# Patient Record
Sex: FEMALE | Race: WHITE | Employment: UNEMPLOYED | URBAN - METROPOLITAN AREA
[De-identification: names, ages, dates, MRNs, and addresses within clinical notes are randomized per-mention and may not be internally consistent; named-entity substitution may affect disease eponyms.]

---

## 2017-05-05 ENCOUNTER — OFFICE VISIT (OUTPATIENT)
Dept: NEUROLOGY | Age: 52
End: 2017-05-05

## 2017-05-05 VITALS
TEMPERATURE: 98.3 F | WEIGHT: 246.1 LBS | HEART RATE: 82 BPM | HEIGHT: 61 IN | BODY MASS INDEX: 46.46 KG/M2 | OXYGEN SATURATION: 98 % | DIASTOLIC BLOOD PRESSURE: 80 MMHG | SYSTOLIC BLOOD PRESSURE: 138 MMHG | RESPIRATION RATE: 18 BRPM

## 2017-05-05 DIAGNOSIS — G43.009 MIGRAINE WITHOUT AURA AND WITHOUT STATUS MIGRAINOSUS, NOT INTRACTABLE: Primary | ICD-10-CM

## 2017-05-05 RX ORDER — ZOLPIDEM TARTRATE 10 MG/1
TABLET ORAL
COMMUNITY
End: 2022-09-02

## 2017-05-05 RX ORDER — SUMATRIPTAN AND NAPROXEN SODIUM 85; 500 MG/1; MG/1
TABLET, FILM COATED ORAL
Qty: 9 TAB | Refills: 3 | Status: SHIPPED | OUTPATIENT
Start: 2017-05-05 | End: 2022-09-02

## 2017-05-05 NOTE — PROGRESS NOTES
[address] [facility]. [address]   [place] [address], [address] [place] [address] Drive    [facility]   [phone] Fax             Referring: Rhina Lemus MD      Chief Complaint   Patient presents with    Headache     daily headache of varied intensity and frequency (x 25yrs)    Light Sensitivity     and sound     52-year-old left-handed woman who presents for evaluation today of what she calls frequent headaches with light and sound sensitivity. She tells me that she has been a headache lady for a number of years and really has not done anything about it as they have not been bothersome. She notes that she has headaches are located in the vertex and she describes them as a squeezing or throbbing type sensation. She has associated photophobia and phonophobia and nausea and rare vomiting. She typically will have 2 per month. On average they last 2 days. She tries to go to bed and sleep them off. She has never had any focal neurologic deficits with a headache. She does not have an aura. She does not lose consciousness, lose vision. She has no fever or chills associated. She has not had any chest pain or shortness of breath or palpitations. She has tried over-the-counter medications including Motrin and Tylenol. She notes she will use Excedrin Migraine if she has a particularly bad headache and that seems to help. She has never tried any migraine specific medications with the exception of Imitrex nasal spray. She was also given some Fioricet-although that is not a migraine specific medication, and that did not do much. She has gone to get a Toradol injection and that has helped her as well. She has never been on a preventative medication. Her mother was diagnosed with cluster headaches in the past.  She notes that she snores but she does not stop breathing.   She    Past Medical History:   Diagnosis Date    Anemia, unspecified 8/20/2011    Autoimmune disease ([facility] [address]     fibromyalgia    Coagulation defects     anemia    Crohn's disease ([facility] [address]     Depression     Diabetes ([facility] [address]     off meds-states her A! C is 4.7    Fibromyalgia     GERD (gastroesophageal reflux disease)     Hypercholesterolemia     Musculoskeletal disorder     Other ill-defined conditions     crohns    Psychiatric disorder     depression    Unspecified sleep apnea     cpap-not using currently       Past Surgical History:   Procedure Laterality Date    HX CHOLECYSTECTOMY  1994-95    HX OOPHORECTOMY  2003    right       Current Outpatient Prescriptions   Medication Sig Dispense Refill    zolpidem (AMBIEN) 10 mg tablet Take  by mouth nightly as needed for Sleep.  fluoxetine (PROZAC) 40 mg capsule Take 40 mg by mouth daily.  cetirizine (ZYRTEC) 10 mg tablet Take 10 mg by mouth daily.  omeprazole (PRILOSEC) 40 mg capsule Take 40 mg by mouth daily.  atorvastatin (LIPITOR) 20 mg tablet Take 40 mg by mouth daily. Allergies   Allergen Reactions    Pcn [Penicillins] Anaphylaxis       Social History   Substance Use Topics    Smoking status: Former Smoker     Quit date: 8/22/1998    Smokeless tobacco: Never Used    Alcohol use Yes      Comment: occasional wine abd cocktail       Family History   Problem Relation Age of Onset    Hypertension Mother     Dementia Mother    [facility] Migraines Mother     Hypertension Father     Heart Disease Father        Review of Systems  Pertinent positives and negatives as noted and the remainder of comprehensive review is negative. Examination  Visit Vitals    /80    Pulse 82    Temp 98.3 °F (36.8 °C) (Oral)    Resp 18    Ht 5' 1\" (1.549 m)    Wt 111.6 kg (246 lb 1.6 oz)    LMP 04/14/2017 (Approximate)    SpO2 98%    BMI 46.5 kg/m2     Pleasant, well appearing. No icterus. Oropharynx clear. Supple neck without bruit. Heart regular. No murmur. No edema.       Neurologically, she is awake, alert, and oriented with normal speech and language. Her cognition is normal.    Intact cranial nerves 2-12. No nystagmus. Visual fields full to confrontation. Disk margins are flat bilaterally. She has normal bulk and tone. She has no abnormal movement. She has no pronation or drift. She generates full strength in the upper and lower extremities to direct confrontational testing. Reflexes are symmetrical in the upper and lower extremities bilaterally. Her toes are down bilaterally. No Curry. Finger nose finger and rapid alternating movements are normal.  Steady gait. No sensory deficit to primary modalities. No Romberg. Impression/Plan  We discussed migraine pathophysiology and the medications we use to treat them including triptans for abortive therapy and medications use to prevent. We discussed migraines are to be viewed as a chronic condition which can be managed much like diabetes or HTN. Typically she has 2 of these about headaches a month and certainly we should try to treat these effectively. Her average duration of 2 days makes me wish to use a combination medication that would hopefully shut down both components of migraine we discussed that. She has had exposure to Imitrex in the past and has tolerated that and therefore we will use Treximet which is of course sumatriptan as well as Naprosyn. We discussed the administration, mechanism of action, potential benefits, potential side effects, and alternatives. She will use 1 at headache onset repeat that in 2 hours if needed maximum 2 in 24 hours. She will track her headaches on a calendar bring that each appointment. She will follow in about 2 months with headache diary. This note was created using voice recognition software. Despite editing, there may be syntax errors. This note will not be viewable in [address]

## 2017-05-05 NOTE — PATIENT INSTRUCTIONS
Information Regarding Testing     If you have physican order for a test or a medication denied by your insurance company, this does not mean the test or medication is not appropriate for you as that is a medical decision, not a decision to be made by an insurance company representative or by an [facility] physician who has not interviewed and examined you. This is a decision to be made between you and your physician. The denial of services is a contractual matter between you and your insurance company, not an issue between your physician and the insurance company. If your test or medication is denied, you can take the following steps to help resolve the issue:    1. File a complaint with the [facility] regarding your insurance company's denial of services ordered for you. You can do this either by calling them directly or by completing an on-line complaint form on the [employer]. This can be found at [email or web address]    2. Also file a formal complaint with your insurance company and ask to have the name of the person denying the service so that you may explore a legal option should you be harmed by this denial of service. Again, the fact the insurance company will not pay for the service does not mean it is not medically necessary and I would encourage you to follow through with the plan that was made with your physician    3. File a written complaint with your employer so your employer and benefit manager is aware of the poor coverage they are providing their employees. If you have medicare/medicaid, complain to your representative in the House and to your Michael Nicole. If we have ordered testing for you, we do not call patients with results and we do not give test results over the phone. We schedule follow up appointments so that your results can be discussed in person and any questions you have regarding them may be addressed.   If something of concern is revealed on your test, we will call you for a sooner follow up appointment. Additionally, results may be found by using the My Chart feature and one of our patient service representatives at the  can give you instructions on how to access this feature of our electronic medical record system. [facility]   Statement to Patients  April 1, 2014      In an effort to ensure the large volume of patient prescription refills is processed in the most efficient and expeditious manner, we are asking our patients to assist us by calling your Pharmacy for all prescription refills, this will include also your  Mail Order Pharmacy. The pharmacy will contact our office electronically to continue the refill process. Please do not wait until the last minute to call your pharmacy. We need at least 48 hours (2days) to fill prescriptions. We also encourage you to call your pharmacy before going to  your prescription to make sure it is ready. With regard to controlled substance prescription refill requests (narcotic refills) that need to be picked up at our office, we ask your cooperation by providing us with at least 72 hours (3days) notice that you will need a refill. We will not refill narcotic prescription refill requests after 4:00pm on any weekday, Monday through Thursday, or after 2:00pm on Fridays, or on the weekends. We encourage everyone to explore another way of getting your prescription refill request processed using [employer], our patient web portal through our electronic medical record system. [employer] is an efficient and effective way to communicate your medication request directly to the office and  downloadable as an marina on your smart phone . [employer] also features a review functionality that allows you to view your medication list as well as leave messages for your physician. Are you ready to get connected?  If so please review the attatched instructions or speak to any of our staff to get you set up right away! Thank you so much for your cooperation. Should you have any questions please contact our Practice Administrator. The Physicians and Staff,  [facility] Tomas Nieves  What is a living will? A living will is a legal form you use to write down the kind of care you want at the end of your life. It is used by the health professionals who will treat you if you aren't able to decide for yourself. If you put your wishes in writing, your loved ones and others will know what kind of care you want. They won't need to guess. This can ease your mind and be helpful to others. A living will is not the same as an estate or property will. An estate will explains what you want to happen with your money and property after you die. Is a living will a legal document? A living will is a legal document. Each state has its own laws about living gaines. If you move to another state, make sure that your living will is legal in the state where you now live. Or you might use a universal form that has been approved by many states. This kind of form can sometimes be completed and stored online. Your electronic copy will then be available wherever you have a connection to the Internet. In most cases, doctors will respect your wishes even if you have a form from a different state. · You don't need an  to complete a living will. But legal advice can be helpful if your state's laws are unclear, your health history is complicated, or your family can't agree on what should be in your living will. · You can change your living will at any time. Some people find that their wishes about end-of-life care change as their health changes. · In addition to making a living will, think about completing a medical power of  form.  This form lets you name the person you want to make end-of-life treatment decisions for you (your \"health care agent\") if you're not able to. Many hospitals and nursing homes will give you the forms you need to complete a living will and a medical power of . · Your living will is used only if you can't make or communicate decisions for yourself anymore. If you become able to make decisions again, you can accept or refuse any treatment, no matter what you wrote in your living will. · Your state may offer an online registry. This is a place where you can store your living will online so the doctors and nurses who need to treat you can find it right away. What should you think about when creating a living will? Talk about your end-of-life wishes with your family members and your doctor. Let them know what you want. That way the people making decisions for you won't be surprised by your choices. Think about these questions as you make your living will:  · Do you know enough about life support methods that might be used? If not, talk to your doctor so you know what might be done if you can't breathe on your own, your heart stops, or you're unable to swallow. · What things would you still want to be able to do after you receive life-support methods? Would you want to be able to walk? To speak? To eat on your own? To live without the help of machines? · If you have a choice, where do you want to be cared for? In your home? At a hospital or nursing home? · Do you want certain Yarsani practices performed if you become very ill? · If you have a choice at the end of your life, where would you prefer to die? At home? In a hospital or nursing home? Somewhere else? · Would you prefer to be buried or cremated? · Do you want your organs to be donated after you die? What should you do with your living will? · Make sure that your family members and your health care agent have copies of your living will.   · Give your doctor a copy of your living will to keep in your medical record. If you have more than one doctor, make sure that each one has a copy. · You may want to put a copy of your living will where it can be easily found. Where can you learn more? Go to http://colton-naya.info/. Enter S964 in the search box to learn more about \"Learning About Living Edith Alcazar. \"  Current as of: February 24, 2016  Content Version: 11.2  © 8074-0203 [employer], [employer]. Care instructions adapted under license by [employer] (which disclaims liability or warranty for this information). If you have questions about a medical condition or this instruction, always ask your healthcare professional. [place] [address] any warranty or liability for your use of this information.

## 2017-05-05 NOTE — MR AVS SNAPSHOT
Visit Information Date & Time Provider Department Dept. Phone Encounter #  
 5/5/2017  1:00 PM Halina Reyes MD Neurology [place] [id number] [phone] [id number] Follow-up Instructions Return in about 2 months (around 7/5/2017). Upcoming Health Maintenance Date Due DTaP/Tdap/Td series (1 - Tdap) 5/9/1987 PAP AKA CERVICAL CYTOLOGY 5/9/1987 BREAST CANCER SCRN MAMMOGRAM 5/9/2016 FOBT Q 1 YEAR AGE 50-75 5/9/2016 INFLUENZA AGE 9 TO ADULT 8/1/2017 Allergies as of 5/5/2017  Review Complete On: 5/5/2017 By: Halina Reyes MD  
  
 Severity Noted Reaction Type Reactions Pcn [Penicillins] High 08/22/2011    Anaphylaxis Current Immunizations  Never Reviewed No immunizations on file. Not reviewed this visit Vitals BP Pulse Temp Resp Height(growth percentile) Weight(growth percentile) 138/80 82 98.3 °F (36.8 °C) (Oral) 18 5' 1\" (1.549 m) 246 lb 1.6 oz (111.6 kg) LMP SpO2 BMI OB Status Smoking Status 04/14/2017 (Approximate) 98% 46.5 kg/m2 Having regular periods Former Smoker Vitals History BMI and BSA Data Body Mass Index Body Surface Area  
 46.5 kg/m 2 2.19 m 2 Preferred Pharmacy Pharmacy Name Phone [address], [address] [address] [facility] [phone] Your Updated Medication List  
  
   
This list is accurate as of: 5/5/17  1:43 PM.  Always use your most recent med list.  
  
  
  
  
 AMBIEN 10 mg tablet Generic drug:  zolpidem Take  by mouth nightly as needed for Sleep. LIPITOR 20 mg tablet Generic drug:  atorvastatin Take 40 mg by mouth daily. omeprazole 40 mg capsule Commonly known as:  PRILOSEC Take 40 mg by mouth daily. PROzac 40 mg capsule Generic drug:  FLUoxetine Take 40 mg by mouth daily. SUMAtriptan-naproxen  mg tablet Commonly known as:  TREXIMET Take one tablet at headache onset and repeat in 2 hours x1 prn  
  
 ZyrTEC 10 mg tablet Generic drug:  cetirizine Take 10 mg by mouth daily. Prescriptions Sent to Pharmacy Refills SUMAtriptan-naproxen (TREXIMET)  mg tablet 3 Sig: Take one tablet at headache onset and repeat in 2 hours x1 prn Class: Normal  
 Pharmacy: [facility] [facility] #: [phone] Follow-up Instructions Return in about 2 months (around 7/5/2017). Patient Instructions Information Regarding Testing If you have physican order for a test or a medication denied by your insurance company, this does not mean the test or medication is not appropriate for you as that is a medical decision, not a decision to be made by an insurance company representative or by an [facility] physician who has not interviewed and examined you. This is a decision to be made between you and your physician. The denial of services is a contractual matter between you and your insurance company, not an issue between your physician and the insurance company. If your test or medication is denied, you can take the following steps to help resolve the issue: 1. File a complaint with the [facility] of Insurance regarding your insurance company's denial of services ordered for you. You can do this either by calling them directly or by completing an on-line complaint form on the [employer].   This can be found at www.[place].gov 
 
 2.   Also file a formal complaint with your insurance company and ask to have the name of the person denying the service so that you may explore a legal option should you be harmed by this denial of service. Again, the fact the insurance company will not pay for the service does not mean it is not medically necessary and I would encourage you to follow through with the plan that was made with your physician 3. File a written complaint with your employer so your employer and benefit manager is aware of the poor coverage they are providing their employees. If you have medicare/medicaid, complain to your representative in the House and to your Michael Nicole. If we have ordered testing for you, we do not call patients with results and we do not give test results over the phone. We schedule follow up appointments so that your results can be discussed in person and any questions you have regarding them may be addressed. If something of concern is revealed on your test, we will call you for a sooner follow up appointment. Additionally, results may be found by using the My Chart feature and one of our patient service representatives at the  can give you instructions on how to access this feature of our electronic medical record system. PRESCRIPTION REFILL POLICY [facility] Statement to Patients April 1, 2014 In an effort to ensure the large volume of patient prescription refills is processed in the most efficient and expeditious manner, we are asking our patients to assist us by calling your Pharmacy for all prescription refills, this will include also your  Mail Order Pharmacy. The pharmacy will contact our office electronically to continue the refill process. Please do not wait until the last minute to call your pharmacy. We need at least 48 hours (2days) to fill prescriptions. We also encourage you to call your pharmacy before going to  your prescription to make sure it is ready. With regard to controlled substance prescription refill requests (narcotic refills) that need to be picked up at our office, we ask your cooperation by providing us with at least 72 hours (3days) notice that you will need a refill. We will not refill narcotic prescription refill requests after 4:00pm on any weekday, Monday through Thursday, or after 2:00pm on Fridays, or on the weekends. We encourage everyone to explore another way of getting your prescription refill request processed using Kony, our patient web portal through our electronic medical record system. Kony is an efficient and effective way to communicate your medication request directly to the office and  downloadable as an marina on your smart phone . Kony also features a review functionality that allows you to view your medication list as well as leave messages for your physician. Are you ready to get connected? If so please review the attatched instructions or speak to any of our staff to get you set up right away! Thank you so much for your cooperation. Should you have any questions please contact our Practice Administrator. The Physicians and Staff,  [facility] Tor[address]o [address] What is a living will? A living will is a legal form you use to write down the kind of care you want at the end of your life. It is used by the health professionals who will treat you if you aren't able to decide for yourself. If you put your wishes in writing, your loved ones and others will know what kind of care you want. They won't need to guess. This can ease your mind and be helpful to others. A living will is not the same as an estate or property will. An estate will explains what you want to happen with your money and property after you die. Is a living will a legal document? A living will is a legal document. Each state has its own laws about living gaines. If you move to another state, make sure that your living will is legal in the state where you now live. Or you might use a universal form that has been approved by many states. This kind of form can sometimes be completed and stored online. Your electronic copy will then be available wherever you have a connection to the Internet. In most cases, doctors will respect your wishes even if you have a form from a different state. · You don't need an  to complete a living will. But legal advice can be helpful if your state's laws are unclear, your health history is complicated, or your family can't agree on what should be in your living will. · You can change your living will at any time. Some people find that their wishes about end-of-life care change as their health changes. · In addition to making a living will, think about completing a medical power of  form. This form lets you name the person you want to make end-of-life treatment decisions for you (your \"health care agent\") if you're not able to. Many hospitals and nursing homes will give you the forms you need to complete a living will and a medical power of . · Your living will is used only if you can't make or communicate decisions for yourself anymore. If you become able to make decisions again, you can accept or refuse any treatment, no matter what you wrote in your living will. · Your state may offer an online registry. This is a place where you can store your living will online so the doctors and nurses who need to treat you can find it right away. What should you think about when creating a living will? Talk about your end-of-life wishes with your family members and your doctor. Let them know what you want. That way the people making decisions for you won't be surprised by your choices. Think about these questions as you make your living will: · Do you know enough about life support methods that might be used? If not, talk to your doctor so you know what might be done if you can't breathe on your own, your heart stops, or you're unable to swallow. · What things would you still want to be able to do after you receive life-support methods? Would you want to be able to walk? To speak? To eat on your own? To live without the help of machines? · If you have a choice, where do you want to be cared for? In your home? At a hospital or nursing home? · Do you want certain Mosque practices performed if you become very ill? · If you have a choice at the end of your life, where would you prefer to die? At home? In a hospital or nursing home? Somewhere else? · Would you prefer to be buried or cremated? · Do you want your organs to be donated after you die? What should you do with your living will? · Make sure that your family members and your health care agent have copies of your living will. · Give your doctor a copy of your living will to keep in your medical record. If you have more than one doctor, make sure that each one has a copy. · You may want to put a copy of your living will where it can be easily found. Where can you learn more? Go to http://colton-naya.info/. Enter V986 in the search box to learn more about \"Learning About Living Farhan Wallace. \" Current as of: February 24, 2016 Content Version: 11.2 © 3969-4707 [employer]. Care instructions adapted under license by [employer] (which disclaims liability or warranty for this information). If you have questions about a medical condition or this instruction, always ask your healthcare professional. [address] any warranty or liability for your use of this information. Introducing [facility]! Sulmaliu José introduces [employer] patient portal. Now you can access parts of your medical record, email your doctor's office, and request medication refills online. 1. In your internet browser, go to [email or web address] 2. Click on the First Time User? Click Here link in the Sign In box. You will see the New Member Sign Up page. 3. Enter your [employer] Access Code exactly as it appears below. You will not need to use this code after youve completed the sign-up process. If you do not sign up before the expiration date, you must request a new code. · [employer] Access Code: [email or web address] Expires: 8/3/2017  1:43 PM 
 
4. Enter the last four digits of your Social Security Number (xxxx) and Date of Birth (mm/dd/yyyy) as indicated and click Submit. You will be taken to the next sign-up page. 5. Create a [employer] Direct ID. This will be your [employer] login ID and cannot be changed, so think of one that is secure and easy to remember. 6. Create a [employer] password. You can change your password at any time. 7. Enter your Password Reset Question and Answer. This can be used at a later time if you forget your password. 8. Enter your e-mail address. You will receive e-mail notification when new information is available in [address] 9. Click Sign Up. You can now view and download portions of your medical record. 10. Click the Download Summary menu link to download a portable copy of your medical information. If you have questions, please visit the Frequently Asked Questions section of the [employer] website. Remember, [employer] is NOT to be used for urgent needs. For medical emergencies, dial 911. Now available from your iPhone and Android! Please provide this summary of care documentation to your next provider. Your primary care clinician is listed as [address]. If you have any questions after today's visit, please call [phone].

## 2017-05-16 RX ORDER — SUMATRIPTAN AND NAPROXEN SODIUM 85; 500 MG/1; MG/1
TABLET, FILM COATED ORAL
Qty: 2 TAB | Refills: 0 | Status: SHIPPED | COMMUNITY
Start: 2017-05-16 | End: 2020-08-20

## 2017-05-19 ENCOUNTER — ED HISTORICAL/CONVERTED ENCOUNTER (OUTPATIENT)
Dept: OTHER | Age: 52
End: 2017-05-19

## 2017-05-21 PROBLEM — G43.009 MIGRAINE WITHOUT AURA AND WITHOUT STATUS MIGRAINOSUS, NOT INTRACTABLE: Status: ACTIVE | Noted: 2017-05-21

## 2017-06-28 ENCOUNTER — OP HISTORICAL/CONVERTED ENCOUNTER (OUTPATIENT)
Dept: OTHER | Age: 52
End: 2017-06-28

## 2017-10-04 ENCOUNTER — OP HISTORICAL/CONVERTED ENCOUNTER (OUTPATIENT)
Dept: OTHER | Age: 52
End: 2017-10-04

## 2020-03-02 ENCOUNTER — IP HISTORICAL/CONVERTED ENCOUNTER (OUTPATIENT)
Dept: OTHER | Age: 55
End: 2020-03-02

## 2020-08-20 ENCOUNTER — HOSPITAL ENCOUNTER (OUTPATIENT)
Dept: LAB | Age: 55
Discharge: HOME | End: 2020-08-20
Payer: MEDICARE

## 2020-08-20 DIAGNOSIS — U07.1 COVID-19: ICD-10-CM

## 2020-08-20 PROCEDURE — 87635 SARS-COV-2 COVID-19 AMP PRB: CPT

## 2020-08-20 RX ORDER — BISMUTH SUBSALICYLATE 262 MG
1 TABLET,CHEWABLE ORAL DAILY
COMMUNITY

## 2020-08-20 RX ORDER — PREGABALIN 300 MG/1
300 CAPSULE ORAL 2 TIMES DAILY
COMMUNITY
End: 2022-09-02

## 2020-08-20 RX ORDER — HYDROCHLOROTHIAZIDE 25 MG/1
25 TABLET ORAL DAILY
COMMUNITY

## 2020-08-20 NOTE — PROGRESS NOTES
[facility]  Endoscopy Preprocedure Instructions      1. On the day of your surgery, please report to registration located on the 2nd floor of the  [facility]. yes    2. You must have a responsible adult to drive you to the hospital, stay at the hospital during your procedure and drive you home. If they leave your procedure will not be started (no exceptions). yes    3. Do not have anything to eat or drink (including water, gum, mints, coffee, and juice) after midnight. This does not apply to the medications you were instructed to take by your physician. yesIf you are currently taking Plavix, Coumadin, Aspirin, or other blood-thinning agents, contact your physician for special instructions. not applicable,    4. If you are having a procedure that requires bowel prep: We recommend that you have only clear liquids the day before your procedure and begin your bowel prep by 5:00 pm.  You may continue to drink clear liquids until midnight. If for any reason you are not able to complete your prep please notify your physician immediately. yes    5. Have a list of all current medications, including vitamins, herbal supplements and any other over the counter medications. yes    6. If you wear glasses, contacts, dentures and/or hearing aids, they may be removed prior to procedure, please bring a case to store them in. yes    7. You should understand that if you do not follow these instructions your procedure may be cancelled. If your physical condition changes (I.e. fever, cold or flu) please contact your doctor as soon as possible. 8. It is important that you be on time.   If for any reason you are unable to keep your appointment please call )- the day of or your physicians office prior to your scheduled procedure        Covid testing completed on 8/20/2020

## 2020-08-21 LAB — SARS-COV-2, COV2NT: NOT DETECTED

## 2020-08-24 ENCOUNTER — ANESTHESIA (OUTPATIENT)
Dept: ENDOSCOPY | Age: 55
End: 2020-08-24
Payer: MEDICARE

## 2020-08-24 ENCOUNTER — HOSPITAL ENCOUNTER (OUTPATIENT)
Age: 55
Setting detail: OUTPATIENT SURGERY
Discharge: HOME | End: 2020-08-24
Attending: INTERNAL MEDICINE | Admitting: INTERNAL MEDICINE
Payer: MEDICARE

## 2020-08-24 ENCOUNTER — ANESTHESIA EVENT (OUTPATIENT)
Dept: ENDOSCOPY | Age: 55
End: 2020-08-24
Payer: MEDICARE

## 2020-08-24 VITALS
OXYGEN SATURATION: 98 % | HEART RATE: 82 BPM | RESPIRATION RATE: 17 BRPM | SYSTOLIC BLOOD PRESSURE: 103 MMHG | BODY MASS INDEX: 44.95 KG/M2 | HEIGHT: 61 IN | TEMPERATURE: 98.9 F | DIASTOLIC BLOOD PRESSURE: 84 MMHG | WEIGHT: 238.1 LBS

## 2020-08-24 PROCEDURE — 76060000031 HC ANESTHESIA FIRST 0.5 HR: Performed by: INTERNAL MEDICINE

## 2020-08-24 PROCEDURE — 74011250636 HC RX REV CODE- 250/636: Performed by: NURSE ANESTHETIST, CERTIFIED REGISTERED

## 2020-08-24 PROCEDURE — 74011000258 HC RX REV CODE- 258: Performed by: NURSE ANESTHETIST, CERTIFIED REGISTERED

## 2020-08-24 PROCEDURE — 76040000019: Performed by: INTERNAL MEDICINE

## 2020-08-24 RX ORDER — ATROPINE SULFATE 0.1 MG/ML
0.5 INJECTION INTRAVENOUS
Status: DISCONTINUED | OUTPATIENT
Start: 2020-08-24 | End: 2020-08-24 | Stop reason: HOSPADM

## 2020-08-24 RX ORDER — DEXTROMETHORPHAN/PSEUDOEPHED 2.5-7.5/.8
1.2 DROPS ORAL
Status: DISCONTINUED | OUTPATIENT
Start: 2020-08-24 | End: 2020-08-24 | Stop reason: HOSPADM

## 2020-08-24 RX ORDER — SODIUM CHLORIDE 9 MG/ML
50 INJECTION, SOLUTION INTRAVENOUS CONTINUOUS
Status: DISCONTINUED | OUTPATIENT
Start: 2020-08-24 | End: 2020-08-24 | Stop reason: HOSPADM

## 2020-08-24 RX ORDER — PROPOFOL 10 MG/ML
INJECTION, EMULSION INTRAVENOUS AS NEEDED
Status: DISCONTINUED | OUTPATIENT
Start: 2020-08-24 | End: 2020-08-24 | Stop reason: HOSPADM

## 2020-08-24 RX ORDER — EPINEPHRINE 0.1 MG/ML
1 INJECTION INTRACARDIAC; INTRAVENOUS
Status: DISCONTINUED | OUTPATIENT
Start: 2020-08-24 | End: 2020-08-24 | Stop reason: HOSPADM

## 2020-08-24 RX ORDER — FENTANYL CITRATE 50 UG/ML
100 INJECTION, SOLUTION INTRAMUSCULAR; INTRAVENOUS
Status: DISCONTINUED | OUTPATIENT
Start: 2020-08-24 | End: 2020-08-24 | Stop reason: HOSPADM

## 2020-08-24 RX ORDER — MIDAZOLAM HYDROCHLORIDE 1 MG/ML
.25-5 INJECTION, SOLUTION INTRAMUSCULAR; INTRAVENOUS
Status: DISCONTINUED | OUTPATIENT
Start: 2020-08-24 | End: 2020-08-24 | Stop reason: HOSPADM

## 2020-08-24 RX ORDER — NALOXONE HYDROCHLORIDE 0.4 MG/ML
0.4 INJECTION, SOLUTION INTRAMUSCULAR; INTRAVENOUS; SUBCUTANEOUS
Status: DISCONTINUED | OUTPATIENT
Start: 2020-08-24 | End: 2020-08-24 | Stop reason: HOSPADM

## 2020-08-24 RX ORDER — FLUMAZENIL 0.1 MG/ML
0.2 INJECTION INTRAVENOUS
Status: DISCONTINUED | OUTPATIENT
Start: 2020-08-24 | End: 2020-08-24 | Stop reason: HOSPADM

## 2020-08-24 RX ADMIN — PHENYLEPHRINE HYDROCHLORIDE 50 MCG: 10 INJECTION INTRAVENOUS at 09:19

## 2020-08-24 RX ADMIN — PROPOFOL 50 MG: 10 INJECTION, EMULSION INTRAVENOUS at 09:15

## 2020-08-24 RX ADMIN — PROPOFOL 50 MG: 10 INJECTION, EMULSION INTRAVENOUS at 09:12

## 2020-08-24 RX ADMIN — PROPOFOL 20 MG: 10 INJECTION, EMULSION INTRAVENOUS at 09:09

## 2020-08-24 RX ADMIN — PROPOFOL 80 MG: 10 INJECTION, EMULSION INTRAVENOUS at 09:07

## 2020-08-24 NOTE — DISCHARGE INSTRUCTIONS
Mayco Snyder  [id number]  1965    DISCHARGE INSTRUCTIONS  Discomfort:  Redness at IV site- apply warm compress to area; if redness or soreness persist- contact your physician. There may be a slight amount of blood passed from the rectum. Gaseous discomfort - walking, belching will help relieve any discomfort. You may not operate a vehicle for 12 hours. You may not engage in an occupation involving machinery or appliances for rest of today. You may not drink alcoholic beverages for at least 12 hours. Avoid making any critical decisions for at least 24 hours. DIET:   High fiber diet. - however -  remember your colon is empty and a heavy meal will produce gas. Avoid these foods:  vegetables, fried / greasy foods, carbonated drinks for today. ACTIVITY:  You may resume your normal daily activities it is recommended that you spend the remainder of the day resting -  avoid any strenuous activity. CALL M.D.   ANY SIGN OF:   Increasing pain, nausea, vomiting  Abdominal distension (swelling)  New increased bleeding (oral or rectal)  Fever (chills)  Pain in chest area  Bloody discharge from nose or mouth  Shortness of breath     Follow-up Instructions:  Call Dr. Rolly Weaver in six months for follow up Francoise from Nurse    The following personal items collected during your admission are returned to you:   Dental Appliance: Dental Appliances: None  Vision: Visual Aid: Glasses  Hearing Aid:    Jewelry:    Clothing:    Other Valuables:    Valuables sent to safe:

## 2020-08-24 NOTE — PROGRESS NOTES

## 2020-08-24 NOTE — PROCEDURES
[address]  [phone]      2020    Colonoscopy Procedure Note  Ebonie Rolnad  :  1965  [facility] Medical Record Number: [id number]    Indications:     Crohn's colitis (screening colon too)  PCP:  Preston Galarza DO  Anesthesia/Sedation: see nursing notes  Endoscopist:  Dr. Kai Tavarez  Assistants: None  Complications:  None  Estimate Blood Loss:  None    Permit:  The indications, risks, benefits and alternatives were reviewed with the patient or their decision maker who was provided an opportunity to ask questions and all questions were answered. The specific risks of colonoscopy with conscious sedation were reviewed, including but not limited to anesthetic complication, bleeding, adverse drug reaction, missed lesion, infection, IV site reactions, and intestinal perforation which would lead to the need for surgical repair. Alternatives to colonoscopy including radiographic imaging, observation without testing, or laboratory testing were reviewed including the limitations of those alternatives. After considering the options and having all their questions answered, the patient or their decision maker provided both verbal and written consent to proceed. Procedure in Detail:  After obtaining informed consent, positioning of the patient in the left lateral decubitus position, and conduction of a pre-procedure pause or \"time out\" the endoscope was introduced into the anus and advanced to the terminal ileum. The quality of the colonic preparation was satisfactory. A careful inspection was made as the colonoscope was withdrawn, findings and interventions are described below.     Appendiceal orifice photographed    Findings:   Mucosal scarring without ulcer, mass effect, polyp, active inflammation or fistula    Specimens:    none    Implants: none    Complications:   None; patient tolerated the procedure well. Estimated blood loss: none    Impression:  Normal colonoscopy to the terminal ileum, with no evidence of neoplasia, diverticular disease, or mucosal abnormality. Recommendations:     - Repeat colonoscopy in 3 years. - resume current medications    Thank you for entrusting me with this patient's care. Please do not hesitate to contact me with any questions or if I can be of assistance with any of your other patients' GI needs.     Signed By: Frandy Hoffmann MD                        August 24, 2020

## 2020-08-24 NOTE — ANESTHESIA POSTPROCEDURE EVALUATION
Procedure(s):  COLONOSCOPY.     MAC    Anesthesia Post Evaluation      Multimodal analgesia: multimodal analgesia used between 6 hours prior to anesthesia start to PACU discharge  Patient location during evaluation: PACU  Patient participation: complete - patient participated  Level of consciousness: awake and alert  Pain management: adequate  Airway patency: patent  Anesthetic complications: no  Cardiovascular status: acceptable  Respiratory status: acceptable  Hydration status: acceptable  Post anesthesia nausea and vomiting:  none      INITIAL Post-op Vital signs:   Vitals Value Taken Time   /84 8/24/2020  9:41 AM   Temp     Pulse 82 8/24/2020  9:41 AM   Resp 17 8/24/2020  9:41 AM   SpO2 98 % 8/24/2020  9:41 AM

## 2020-08-24 NOTE — PROGRESS NOTES
Mayco Snyder  1965  [id number]    Situation:  Verbal report received from: Joanna Lazcano RN  Procedure: Procedure(s):  COLONOSCOPY    Background:    Preoperative diagnosis: CROHNS  Postoperative diagnosis: 1.- Chrons    :  Dr. Josafat Centeno  Assistant(s): Endoscopy Technician-1: Ac Tejada  Endoscopy RN-1: Parmjit Lubin    Specimens: * No specimens in log *  H. Pylori  no    Assessment:    Anesthesia gave intra-procedure sedation and medications, see anesthesia flow sheet yes    Intravenous fluids: NS@ KVO     Vital signs stable     Abdominal assessment: round and soft     Recommendation:  Discharge patient per MD order  Family or Friend   Permission to share finding with family or friend yes

## 2020-08-24 NOTE — H&P
Patient Name: Ashley Torrez   Account #: [id number]    Gender: Female    (age): 1965 (48)       Provider:     Baylee Landeros MD        Referring Physician:     Rusty Garcia  [facility] Jessica Fine  [phone] (phone)  [phone] (fax)     [address]  [phone] (phone)  [phone] (fax)        Chief Complaint: Crohn's disease (follow-up), GERD (follow-up)           History of Present Illness:   personal history of GERD; on 40 mg daily omeprazole has infrequent if any indigestion, heartburn; does not have dysphagia, does not have vomiting. Previously cared for Crohn's disease here; she moved to [place] where she eventually had breakthrough of her oral medications. Last year had a colonoscopy demonstrating active disease, was placed on Humira. Currently has 1-3 solid daily bowel movements without visible blood loss, pain? Past Medical History      Medical Conditions: Anemia  Arthritis  Crohn's disease  Diabetes Mellitus  High blood pressure  High cholesterol  Sleep apnea   Surgical Procedures: Gallbladder surgery,   Other major surgeries:, removal of R ovary   Dx Studies: Barium Enema,   Barium Swallow,   Colonoscopy   Medications: atorvastatin 40 mg Take 1 by mouth once a day as directed  fluoxetine 60 mg Take 1 tablet by mouth once a day as directed  Humira 40 mg/0.8 mL  hydrochlorothiazide 50 mg Take 1 by mouth once a day as directed  Lyrica 150 mg Take 1 capsule by mouth once a day as directed  omeprazole 40 mg Take 1 capsule by mouth once a day as directed  zolpidem 5 mg Take 1 by mouth once a day as directed   Allergies: Penicillins   Immunizations: Flu vaccine, 10/1/2019  Pneumococcal conjugate PCV 13, 10/1/2019  TB Test,   Hep A,   Hep B,       Social History      Alcohol: Alcohol consumption: Monthly.    Tobacco: Former smokerCigarettes 2 packs a day, quit 1998.   Drugs: None   Exercise: Exercise less than 3 times a week. Caffeine: Daily. Family History No history of Celiac sprue, Colon Polyps, Esophageal Cancer, GI Cancers, Liver disease, Pancreatic Cancer, Stomach Cancer  Other: Diagnosed with colon cancer, Crohn's disease or ulcerative colitis; Review of Systems:   Cardiovascular: Denies chest pain, irregular heart beat, palpitations, peripheral edema, syncope, Sweats. Constitutional: Presents suffers from fatigue. Denies fever, loss of appetite, weight gain, weight loss. ENMT: Denies nose bleeds, sore throat, hearing loss. Endocrine: Denies excessive thirst, heat intolerance. Eyes: Denies loss of vision. Gastrointestinal: Presents suffers from heartburn. Denies abdominal pain, abdominal swelling, change in bowel habits, constipation, diarrhea, Bloating/gas, jaundice, nausea, rectal bleeding, stomach cramps, vomiting, dysphagia, rectal pain, Stool incontinence, hematemesis. Genitourinary: Denies dark urine, dysuria, frequent urination, hematuria, incontinence. Hematologic/Lymphatic: Denies easy bruising, prolonged bleeding. Integumentary: Presents suffers from itching. Denies rashes, sun sensitivity. Musculoskeletal: Presents suffers from arthritis, back pain, joint pain, muscle weakness, stiffness. Denies gout. Neurological: Denies dizziness, fainting, frequent headaches, memory loss. Psychiatric: Denies anxiety, depression, difficulty sleeping, hallucinations, nervousness, panic attacks, paranoia. Respiratory: Denies cough, dyspnea, wheezing. Vital Signs: see nursing notes      Physical Exam:   Constitutional:      Appearance: No distress, appears comfortable. Skin:      Inspection: No rash, no jaundice. Head/face: Inspection: Normacephalic, atraumatic. Eyes:      Conjunctivae/lids: Normal.   ENMT:      External: Normal.   Neck:      Neck: Normal appearance, trachea midline.    Respiratory:      Effort: Normal respiratory effort, comfortable, speaks in complete sentences. Auscultation: normal breath sounds, no rubs, wheezes or rhonchi. Cardiovascular: Auscultation: normal, S1 and S2, no gallops , no rubs or murmurs . Gastrointestinal/Abdomen:      Abdomen: non-distended, nontender. Liver/Spleen: normal, normal size, Liver size and consistency normal, spleen is non-palpable. Musculoskeletal:      Gait/station: normal.   Muscles: normal strength and tone, no atrophy or abnormal movements. Psychiatric:      Judgment/insight: Normal, normal judgement, normal insight. Mood and affect: Normal mood, affect full, no evidence of depression, anxiety or agitation. I  Impressions: Crohn's disease of colon; recent overt bleed  Gastroesophageal reflux disease (GERD)? ? Plan: I've discussed colonoscopy possible biopsy, polypectomy, cautery, injection, alternatives, complications including but not limited to pain, cardiopulmonary event, bleeding, perforation requiring additional blood transfusion or operative repair; all questions answered.   Daily omeprazole

## 2020-08-24 NOTE — ANESTHESIA PREPROCEDURE EVALUATION
Anesthetic History   No history of anesthetic complications            Review of Systems / Medical History  Patient summary reviewed, nursing notes reviewed and pertinent labs reviewed    Pulmonary        Sleep apnea: CPAP      Pertinent negatives: No shortness of breath and recent URI     Neuro/Psych         Headaches     Cardiovascular    Hypertension          Hyperlipidemia         GI/Hepatic/Renal     GERD           Endo/Other    Diabetes    Morbid obesity, arthritis and anemia     Other Findings              Physical Exam    Airway  Mallampati: III  TM Distance: 4 - 6 cm  Neck ROM: normal range of motion   Mouth opening: Normal     Cardiovascular    Rhythm: regular  Rate: normal         Dental    Dentition: Caps/crowns, Lower dentition intact and Upper dentition intact     Pulmonary  Breath sounds clear to auscultation               Abdominal         Other Findings            Anesthetic Plan    ASA: 3  Anesthesia type: MAC            Anesthetic plan and risks discussed with: Patient

## 2020-08-24 NOTE — PROGRESS NOTES
Endoscopy discharge instructions have been reviewed and given to patient. The patient verbalized understanding and acceptance of instructions. Dr. Charles Layne discussed with patient procedure findings and next steps.

## 2021-04-09 ENCOUNTER — HOSPITAL ENCOUNTER (OUTPATIENT)
Dept: GENERAL RADIOLOGY | Age: 56
Discharge: HOME | End: 2021-04-09
Attending: NURSE PRACTITIONER
Payer: MEDICARE

## 2021-04-09 ENCOUNTER — TRANSCRIBE ORDER (OUTPATIENT)
Dept: GENERAL RADIOLOGY | Age: 56
End: 2021-04-09

## 2021-04-09 DIAGNOSIS — M54.12 BRACHIAL NEURITIS: Primary | ICD-10-CM

## 2021-04-09 DIAGNOSIS — M54.12 BRACHIAL NEURITIS: ICD-10-CM

## 2021-04-09 PROCEDURE — 72052 X-RAY EXAM NECK SPINE 6/>VWS: CPT

## 2022-03-19 PROBLEM — G43.009 MIGRAINE WITHOUT AURA AND WITHOUT STATUS MIGRAINOSUS, NOT INTRACTABLE: Status: ACTIVE | Noted: 2017-05-21

## 2022-08-30 ENCOUNTER — HOSPITAL ENCOUNTER (OUTPATIENT)
Age: 57
Setting detail: OBSERVATION
Discharge: HOME | End: 2022-09-02
Attending: EMERGENCY MEDICINE | Admitting: FAMILY MEDICINE
Payer: MEDICARE

## 2022-08-30 ENCOUNTER — APPOINTMENT (OUTPATIENT)
Dept: GENERAL RADIOLOGY | Age: 57
End: 2022-08-30
Attending: EMERGENCY MEDICINE
Payer: MEDICARE

## 2022-08-30 ENCOUNTER — APPOINTMENT (OUTPATIENT)
Dept: CT IMAGING | Age: 57
End: 2022-08-30
Attending: EMERGENCY MEDICINE
Payer: MEDICARE

## 2022-08-30 DIAGNOSIS — R07.2 SUBSTERNAL CHEST PAIN: ICD-10-CM

## 2022-08-30 DIAGNOSIS — R77.8 ELEVATED TROPONIN: Primary | ICD-10-CM

## 2022-08-30 PROBLEM — R07.9 CHEST PAIN: Status: ACTIVE | Noted: 2022-08-30

## 2022-08-30 LAB
ALBUMIN SERPL-MCNC: 3.4 G/DL (ref 3.5–5)
ALBUMIN/GLOB SERPL: 0.9 {RATIO} (ref 1.1–2.2)
ALP SERPL-CCNC: 70 U/L (ref 45–117)
ALT SERPL-CCNC: 24 U/L (ref 12–78)
ANION GAP SERPL CALC-SCNC: 7 MMOL/L (ref 5–15)
AST SERPL W P-5'-P-CCNC: 13 U/L (ref 15–37)
BASOPHILS # BLD: 0 K/UL (ref 0–0.1)
BASOPHILS NFR BLD: 0 % (ref 0–1)
BILIRUB SERPL-MCNC: 0.3 MG/DL (ref 0.2–1)
BUN SERPL-MCNC: 17 MG/DL (ref 6–20)
BUN/CREAT SERPL: 23 (ref 12–20)
CA-I BLD-MCNC: 8.8 MG/DL (ref 8.5–10.1)
CHLORIDE SERPL-SCNC: 102 MMOL/L (ref 97–108)
CO2 SERPL-SCNC: 28 MMOL/L (ref 21–32)
CREAT SERPL-MCNC: 0.74 MG/DL (ref 0.55–1.02)
DIFFERENTIAL METHOD BLD: ABNORMAL
EOSINOPHIL # BLD: 0.1 K/UL (ref 0–0.4)
EOSINOPHIL NFR BLD: 1 % (ref 0–7)
ERYTHROCYTE [DISTWIDTH] IN BLOOD BY AUTOMATED COUNT: 12.9 % (ref 11.5–14.5)
GLOBULIN SER CALC-MCNC: 3.6 G/DL (ref 2–4)
GLUCOSE SERPL-MCNC: 199 MG/DL (ref 65–100)
HCT VFR BLD AUTO: 37 % (ref 35–47)
HGB BLD-MCNC: 12.2 G/DL (ref 11.5–16)
IMM GRANULOCYTES # BLD AUTO: 0.1 K/UL (ref 0–0.04)
IMM GRANULOCYTES NFR BLD AUTO: 1 % (ref 0–0.5)
LYMPHOCYTES # BLD: 1 K/UL (ref 0.8–3.5)
LYMPHOCYTES NFR BLD: 7 % (ref 12–49)
MCH RBC QN AUTO: 30.6 PG (ref 26–34)
MCHC RBC AUTO-ENTMCNC: 33 G/DL (ref 30–36.5)
MCV RBC AUTO: 92.7 FL (ref 80–99)
MONOCYTES # BLD: 0.7 K/UL (ref 0–1)
MONOCYTES NFR BLD: 4 % (ref 5–13)
NEUTS SEG # BLD: 14.1 K/UL (ref 1.8–8)
NEUTS SEG NFR BLD: 87 % (ref 32–75)
NRBC # BLD: 0 K/UL (ref 0–0.01)
NRBC BLD-RTO: 0 PER 100 WBC
PLATELET # BLD AUTO: 217 K/UL (ref 150–400)
PMV BLD AUTO: 10.9 FL (ref 8.9–12.9)
POTASSIUM SERPL-SCNC: 3 MMOL/L (ref 3.5–5.1)
PROT SERPL-MCNC: 7 G/DL (ref 6.4–8.2)
RBC # BLD AUTO: 3.99 M/UL (ref 3.8–5.2)
SODIUM SERPL-SCNC: 137 MMOL/L (ref 136–145)
TROPONIN-HIGH SENSITIVITY: 116 NG/L (ref 0–51)
TROPONIN-HIGH SENSITIVITY: 229 NG/L (ref 0–51)
WBC # BLD AUTO: 16.1 K/UL (ref 3.6–11)

## 2022-08-30 PROCEDURE — 80053 COMPREHEN METABOLIC PANEL: CPT

## 2022-08-30 PROCEDURE — 93005 ELECTROCARDIOGRAM TRACING: CPT

## 2022-08-30 PROCEDURE — 65270000029 HC RM PRIVATE

## 2022-08-30 PROCEDURE — 74011250637 HC RX REV CODE- 250/637: Performed by: FAMILY MEDICINE

## 2022-08-30 PROCEDURE — 71275 CT ANGIOGRAPHY CHEST: CPT

## 2022-08-30 PROCEDURE — 84484 ASSAY OF TROPONIN QUANT: CPT

## 2022-08-30 PROCEDURE — 71045 X-RAY EXAM CHEST 1 VIEW: CPT

## 2022-08-30 PROCEDURE — 94761 N-INVAS EAR/PLS OXIMETRY MLT: CPT

## 2022-08-30 PROCEDURE — 74011250636 HC RX REV CODE- 250/636: Performed by: EMERGENCY MEDICINE

## 2022-08-30 PROCEDURE — 96374 THER/PROPH/DIAG INJ IV PUSH: CPT

## 2022-08-30 PROCEDURE — 74011000250 HC RX REV CODE- 250: Performed by: EMERGENCY MEDICINE

## 2022-08-30 PROCEDURE — 99285 EMERGENCY DEPT VISIT HI MDM: CPT

## 2022-08-30 PROCEDURE — 85025 COMPLETE CBC W/AUTO DIFF WBC: CPT

## 2022-08-30 PROCEDURE — 74011250637 HC RX REV CODE- 250/637: Performed by: EMERGENCY MEDICINE

## 2022-08-30 PROCEDURE — G0378 HOSPITAL OBSERVATION PER HR: HCPCS

## 2022-08-30 PROCEDURE — 74011000636 HC RX REV CODE- 636: Performed by: EMERGENCY MEDICINE

## 2022-08-30 PROCEDURE — 36415 COLL VENOUS BLD VENIPUNCTURE: CPT

## 2022-08-30 PROCEDURE — 83036 HEMOGLOBIN GLYCOSYLATED A1C: CPT

## 2022-08-30 PROCEDURE — 96375 TX/PRO/DX INJ NEW DRUG ADDON: CPT

## 2022-08-30 RX ORDER — ENOXAPARIN SODIUM 100 MG/ML
40 INJECTION SUBCUTANEOUS DAILY
Status: DISCONTINUED | OUTPATIENT
Start: 2022-08-31 | End: 2022-08-31

## 2022-08-30 RX ORDER — HYDROCHLOROTHIAZIDE 25 MG/1
25 TABLET ORAL DAILY
Status: DISCONTINUED | OUTPATIENT
Start: 2022-08-31 | End: 2022-09-02 | Stop reason: HOSPADM

## 2022-08-30 RX ORDER — MAG HYDROX/ALUMINUM HYD/SIMETH 200-200-20
30 SUSPENSION, ORAL (FINAL DOSE FORM) ORAL ONCE
Status: COMPLETED | OUTPATIENT
Start: 2022-08-30 | End: 2022-08-30

## 2022-08-30 RX ORDER — MAGNESIUM SULFATE 100 %
4 CRYSTALS MISCELLANEOUS AS NEEDED
Status: DISCONTINUED | OUTPATIENT
Start: 2022-08-30 | End: 2022-09-02 | Stop reason: HOSPADM

## 2022-08-30 RX ORDER — PANTOPRAZOLE SODIUM 40 MG/1
40 TABLET, DELAYED RELEASE ORAL
Status: DISCONTINUED | OUTPATIENT
Start: 2022-08-31 | End: 2022-09-02 | Stop reason: HOSPADM

## 2022-08-30 RX ORDER — ONDANSETRON 2 MG/ML
4 INJECTION INTRAMUSCULAR; INTRAVENOUS
Status: COMPLETED | OUTPATIENT
Start: 2022-08-30 | End: 2022-08-30

## 2022-08-30 RX ORDER — MORPHINE SULFATE 4 MG/ML
4 INJECTION INTRAVENOUS
Status: COMPLETED | OUTPATIENT
Start: 2022-08-30 | End: 2022-08-30

## 2022-08-30 RX ORDER — THERA TABS 400 MCG
1 TAB ORAL DAILY
Status: DISCONTINUED | OUTPATIENT
Start: 2022-08-31 | End: 2022-09-02 | Stop reason: HOSPADM

## 2022-08-30 RX ORDER — ONDANSETRON 2 MG/ML
4 INJECTION INTRAMUSCULAR; INTRAVENOUS
Status: DISCONTINUED | OUTPATIENT
Start: 2022-08-30 | End: 2022-09-02 | Stop reason: HOSPADM

## 2022-08-30 RX ORDER — ASPIRIN 325 MG
325 TABLET ORAL DAILY
Status: DISCONTINUED | OUTPATIENT
Start: 2022-08-31 | End: 2022-09-02 | Stop reason: HOSPADM

## 2022-08-30 RX ORDER — ATORVASTATIN CALCIUM 40 MG/1
40 TABLET, FILM COATED ORAL DAILY
Status: DISCONTINUED | OUTPATIENT
Start: 2022-08-31 | End: 2022-09-02 | Stop reason: HOSPADM

## 2022-08-30 RX ORDER — ACETAMINOPHEN 325 MG/1
650 TABLET ORAL
Status: DISCONTINUED | OUTPATIENT
Start: 2022-08-30 | End: 2022-09-02 | Stop reason: HOSPADM

## 2022-08-30 RX ORDER — FLUOXETINE HYDROCHLORIDE 20 MG/1
40 CAPSULE ORAL DAILY
Status: DISCONTINUED | OUTPATIENT
Start: 2022-08-31 | End: 2022-09-02 | Stop reason: HOSPADM

## 2022-08-30 RX ORDER — ZOLPIDEM TARTRATE 5 MG/1
5 TABLET ORAL
Status: DISCONTINUED | OUTPATIENT
Start: 2022-08-30 | End: 2022-09-02 | Stop reason: HOSPADM

## 2022-08-30 RX ORDER — ONDANSETRON 4 MG/1
4 TABLET, ORALLY DISINTEGRATING ORAL
Status: DISCONTINUED | OUTPATIENT
Start: 2022-08-30 | End: 2022-09-02 | Stop reason: HOSPADM

## 2022-08-30 RX ORDER — LIDOCAINE HYDROCHLORIDE 20 MG/ML
15 SOLUTION OROPHARYNGEAL
Status: COMPLETED | OUTPATIENT
Start: 2022-08-30 | End: 2022-08-30

## 2022-08-30 RX ORDER — PREGABALIN 150 MG/1
300 CAPSULE ORAL 2 TIMES DAILY
Status: DISCONTINUED | OUTPATIENT
Start: 2022-08-30 | End: 2022-09-02 | Stop reason: HOSPADM

## 2022-08-30 RX ORDER — ACETAMINOPHEN 650 MG/1
650 SUPPOSITORY RECTAL
Status: DISCONTINUED | OUTPATIENT
Start: 2022-08-30 | End: 2022-09-02 | Stop reason: HOSPADM

## 2022-08-30 RX ORDER — CETIRIZINE HCL 10 MG
10 TABLET ORAL DAILY
Status: DISCONTINUED | OUTPATIENT
Start: 2022-08-31 | End: 2022-09-02 | Stop reason: HOSPADM

## 2022-08-30 RX ORDER — POLYETHYLENE GLYCOL 3350 17 G/17G
17 POWDER, FOR SOLUTION ORAL DAILY PRN
Status: DISCONTINUED | OUTPATIENT
Start: 2022-08-30 | End: 2022-09-02 | Stop reason: HOSPADM

## 2022-08-30 RX ORDER — INSULIN LISPRO 100 [IU]/ML
INJECTION, SOLUTION INTRAVENOUS; SUBCUTANEOUS
Status: DISCONTINUED | OUTPATIENT
Start: 2022-08-31 | End: 2022-09-02 | Stop reason: HOSPADM

## 2022-08-30 RX ADMIN — IOPAMIDOL 100 ML: 755 INJECTION, SOLUTION INTRAVENOUS at 19:40

## 2022-08-30 RX ADMIN — PREGABALIN 300 MG: 150 CAPSULE ORAL at 23:05

## 2022-08-30 RX ADMIN — LIDOCAINE HYDROCHLORIDE 15 ML: 20 SOLUTION ORAL; TOPICAL at 16:27

## 2022-08-30 RX ADMIN — MORPHINE SULFATE 4 MG: 4 INJECTION, SOLUTION INTRAMUSCULAR; INTRAVENOUS at 18:55

## 2022-08-30 RX ADMIN — ALUMINUM HYDROXIDE, MAGNESIUM HYDROXIDE, AND SIMETHICONE 30 ML: 200; 200; 20 SUSPENSION ORAL at 16:27

## 2022-08-30 RX ADMIN — ONDANSETRON 4 MG: 2 INJECTION INTRAMUSCULAR; INTRAVENOUS at 16:27

## 2022-08-30 NOTE — Clinical Note
Status[de-identified] INPATIENT [101]   Type of Bed: Remote Telemetry [29]   Cardiac Monitoring Required?: Yes   Inpatient Hospitalization Certified Necessary for the Following Reasons: 3.  Patient receiving treatment that can only be provided in an inpatient setting (further clarification in H&P documentation)   Admitting Diagnosis: Chest pain [116054]   Admitting Physician: Toy Burns [[id number]]   Attending Physician: Toy Burns [[id number]]   Estimated Length of Stay: 2 Midnights   Discharge Plan[de-identified] Home with Office Follow-up

## 2022-08-30 NOTE — ED PROVIDER NOTES
EMERGENCY DEPARTMENT HISTORY AND PHYSICAL EXAM      Date: 8/30/2022  Patient Name: Sheila Matt    History of Presenting Illness     Chief Complaint   Patient presents with    Chest Pain    Nausea       History Provided By: Patient    HPI: Sheila Matt, 65 y.o. female complains of 1 day history of substernal chest pain. Patient reports the pain is sharp, without noted aggravating relieving factors. Patient states the pain is moderate in intensity. Patient reports nausea without vomiting. There are no other complaints, changes, or physical findings at this time. PCP: Diamond Duong, DO    No current facility-administered medications on file prior to encounter. Current Outpatient Medications on File Prior to Encounter   Medication Sig Dispense Refill    multivitamin (ONE A DAY) tablet Take 1 Tab by mouth daily. pregabalin (Lyrica) 300 mg capsule Take 300 mg by mouth two (2) times a day. hydroCHLOROthiazide (HYDRODIURIL) 25 mg tablet Take 25 mg by mouth daily. adalimumab (HUMIRA) 40 mg/0.8 mL injection pen 40 mg by SubCUTAneous route Once every 2 weeks. zolpidem (AMBIEN) 10 mg tablet Take  by mouth nightly as needed for Sleep. SUMAtriptan-naproxen (TREXIMET)  mg tablet Take one tablet at headache onset and repeat in 2 hours x1 prn 9 Tab 3    fluoxetine (PROZAC) 40 mg capsule Take 40 mg by mouth daily. cetirizine (ZYRTEC) 10 mg tablet Take 10 mg by mouth daily. omeprazole (PRILOSEC) 40 mg capsule Take 40 mg by mouth daily. atorvastatin (LIPITOR) 20 mg tablet Take 40 mg by mouth daily.          Past History     Past Medical History:  Past Medical History:   Diagnosis Date    Anemia, unspecified 8/20/2011    Autoimmune disease ([facility] [address]     fibromyalgia    Coagulation defects     anemia    Crohn's disease ([facility] [address]     Depression     Fibromyalgia     GERD (gastroesophageal reflux disease)     Hypercholesterolemia     Hypertension     Musculoskeletal disorder Other ill-defined conditions(799.89)     crohns    Psychiatric disorder     depression    Unspecified sleep apnea     cpap       Past Surgical History:  Past Surgical History:   Procedure Laterality Date    COLONOSCOPY N/A 2020    COLONOSCOPY performed by Christian Grewal MD at [facility] ENDOSCOPY    HX CHOLECYSTECTOMY  5503-70    HX OOPHORECTOMY      right       Family History:  Family History   Problem Relation Age of Onset    Hypertension Mother     Dementia Mother     Migraines Mother     Hypertension Father     Heart Disease Father        Social History:  Social History     Tobacco Use    Smoking status: Former     Types: Cigarettes     Quit date: 1998     Years since quittin.0    Smokeless tobacco: Never   Substance Use Topics    Alcohol use: Yes     Comment: occasional wine and cocktail    Drug use: No       Allergies: Allergies   Allergen Reactions    Pcn [Penicillins] Anaphylaxis       Review of Systems   Review of Systems  Review of Systems   Constitutional: Negative for chills and fever. HENT: Negative for sinus pressure and sinus pain. Eyes: Negative for photophobia and redness. Respiratory: Negative for shortness of breath and wheezing. Cardiovascular: Positive for chest pain and negative for palpitations. Gastrointestinal: Negative for abdominal pain and nausea. Genitourinary: Negative for flank pain and hematuria. Musculoskeletal: Negative for arthralgias and gait problem. Skin: Negative for color change and pallor. Neurological: Negative for dizziness and weakness. Physical Exam   Physical Exam  Physical Exam  Constitutional:       General: No acute distress. Appearance: Normal appearance. Not toxic-appearing. HENT:      Head: Normocephalic and atraumatic. Nose: Nose normal.      Mouth/Throat:      Mouth: Mucous membranes are moist.   Eyes:      Extraocular Movements: Extraocular movements intact.       Pupils: Pupils are equal, round, and reactive to light. Cardiovascular:      Rate and Rhythm: Normal rate. Pulses: Normal pulses. Pulmonary:      Effort: Pulmonary effort is normal.      Breath sounds: No stridor, clear to auscultation bilaterally  Abdominal:      General: Abdomen is flat. There is no distension. Musculoskeletal:         General: Normal range of motion. Chest wall: Tenderness with palpation of the sternum which exactly reproduces the patient's pain complaint. Skin:     General: Skin is warm and dry. Capillary Refill: Capillary refill takes less than 2 seconds. Neurological:      General: No focal deficit present. Mental Status: Alert and oriented to person, place, and time. Psychiatric:         Mood and Affect: Mood normal.         Behavior: Behavior normal.     Lab and Diagnostic Study Results   Labs -     Recent Results (from the past 12 hour(s))   CBC WITH AUTOMATED DIFF    Collection Time: 08/30/22  3:51 PM   Result Value Ref Range    WBC 16.1 (H) 3.6 - 11.0 K/uL    RBC 3.99 3.80 - 5.20 M/uL    HGB 12.2 11.5 - 16.0 g/dL    HCT 37.0 35.0 - 47.0 %    MCV 92.7 80.0 - 99.0 FL    MCH 30.6 26.0 - 34.0 PG    MCHC 33.0 30.0 - 36.5 g/dL    RDW 12.9 11.5 - 14.5 %    PLATELET 598 467 - 354 K/uL    MPV 10.9 8.9 - 12.9 FL    NRBC 0.0 0.0  WBC    ABSOLUTE NRBC 0.00 0.00 - 0.01 K/uL    NEUTROPHILS 87 (H) 32 - 75 %    LYMPHOCYTES 7 (L) 12 - 49 %    MONOCYTES 4 (L) 5 - 13 %    EOSINOPHILS 1 0 - 7 %    BASOPHILS 0 0 - 1 %    IMMATURE GRANULOCYTES 1 (H) 0 - 0.5 %    ABS. NEUTROPHILS 14.1 (H) 1.8 - 8.0 K/UL    ABS. LYMPHOCYTES 1.0 0.8 - 3.5 K/UL    ABS. MONOCYTES 0.7 0.0 - 1.0 K/UL    ABS. EOSINOPHILS 0.1 0.0 - 0.4 K/UL    ABS. BASOPHILS 0.0 0.0 - 0.1 K/UL    ABS. IMM.  GRANS. 0.1 (H) 0.00 - 0.04 K/UL    DF AUTOMATED     METABOLIC PANEL, COMPREHENSIVE    Collection Time: 08/30/22  3:51 PM   Result Value Ref Range    Sodium 137 136 - 145 mmol/L    Potassium 3.0 (L) 3.5 - 5.1 mmol/L    Chloride 102 97 - 108 mmol/L    CO2 28 21 - 32 mmol/L    Anion gap 7 5 - 15 mmol/L    Glucose 199 (H) 65 - 100 mg/dL    BUN 17 6 - 20 mg/dL    Creatinine 0.74 0.55 - 1.02 mg/dL    BUN/Creatinine ratio 23 (H) 12 - 20      GFR est AA >60 >60 ml/min/1.73m2    GFR est non-AA >60 >60 ml/min/1.73m2    Calcium 8.8 8.5 - 10.1 mg/dL    Bilirubin, total 0.3 0.2 - 1.0 mg/dL    AST (SGOT) 13 (L) 15 - 37 U/L    ALT (SGPT) 24 12 - 78 U/L    Alk. phosphatase 70 45 - 117 U/L    Protein, total 7.0 6.4 - 8.2 g/dL    Albumin 3.4 (L) 3.5 - 5.0 g/dL    Globulin 3.6 2.0 - 4.0 g/dL    A-G Ratio 0.9 (L) 1.1 - 2.2     TROPONIN-HIGH SENSITIVITY    Collection Time: 08/30/22  3:51 PM   Result Value Ref Range    Troponin-High Sensitivity 116 (H) 0 - 51 ng/L       Radiologic Studies -   @lastxrresult@  CT Results  (Last 48 hours)      None          CXR Results  (Last 48 hours)                 08/30/22 1552  XR CHEST PORT Final result    Impression:  No acute process. Narrative: Indication: Chest pain       Comparison: None       Portable exam of the chest obtained at 1552 demonstrates normal heart size. There is no acute process in the lung fields. The osseous structures are   unremarkable. Medical Decision Making and ED Course   Differential Diagnosis & Medical Decision Making Provider Note:   HEART SCORE:   History: Slightly or Non-suspicious  ECG: Normal  Age: Greater than 45 years - Less than 65 years  Risk Factors: 1 or 2 risk factors  Troponin: Less than or equal to normal limit   HEART Score Total : 2     Management   Scores 0-3: 0.9-1.7% risk of adverse cardiac event. In the HEART Score study, these patients were discharged (0.99% in the retrospective study, 1.7% in the prospective study)   Scores 4-6: 12-16.6% risk of adverse cardiac event. In the HEART Score study, these patients were admitted to the hospital. (11.6% retrospective, 16.6% prospective)   Scores ? 7: 50-65% risk of adverse cardiac event.  In the HEART Score study, these patients were candidates for early invasive measures. (65.2% retrospective, 50.1% prospective)   A MACE (Major Adverse Cardiac Event) was defined as all-cause mortality, myocardial infarction, or coronary revascularization. Original/Primary Reference  Johnathon VERNON, Alex WALKER, Brad SHARMA. Chest pain in the emergency room: value of the HEART score. [facility] Heart J. 2008;16(6):191-6. Validation  Alex WALKER, Johnathon VERNON, Brad SHARMA, et al. A prospective validation of the HEART score for chest pain patients at the emergency department. Int J Cardiol. 2013;168(3):2153-8. Johnathon Christie, Cristina Taylor et al. Chest pain in the emergency room: a multicenter validation of the HEART Score. Crit Pathw Cardiol. 2010;9(3):164-9. Heather WINIFRED, Anmol RF, Anand ESPINOSA, et al. The HEART Pathway Randomized Controlled Trial One Year Outcomes. Acad Emerg Med. 2018;        - I am the first provider for this patient. I reviewed the vital signs, available nursing notes, past medical history, past surgical history, family history and social history. The patients presenting problems have been discussed, and they are in agreement with the care plan formulated and outlined with them. I have encouraged them to ask questions as they arise throughout their visit. Vital Signs-Reviewed the patient's vital signs. Patient Vitals for the past 12 hrs:   Temp Pulse Resp BP SpO2   08/30/22 1835 -- (!) 117 -- (!) 111/58 97 %   08/30/22 1750 -- (!) 124 22 (!) 101/59 98 %   08/30/22 1646 -- (!) 124 22 120/89 99 %   08/30/22 1606 98.5 °F (36.9 °C) -- -- -- --   08/30/22 1551 -- (!) 126 19 (!) 142/62 98 %       ED Course:   ED Course as of 08/30/22 1915   Tue Aug 30, 2022   [facility] Patient reports feeling somewhat better after the GI cocktail. Discussed the findings including the elevated troponin and need for kidney continued monitoring and treatment and admission. Given her tachycardia and pleuritic pain, will get a CTA to rule out PE in the interim.  [CS]      ED Course User Index  [CS] Juanita Blanco MD       Disposition   Disposition: Admitted pending CT results        Diagnosis/Clinical Impression     Clinical Impression:   1. Elevated troponin    2. Substernal chest pain        Attestations: Rony WASHINGTON MD, am the primary clinician of record. Please note that this dictation was completed with [employer], the computer voice recognition software. Quite often unanticipated grammatical, syntax, homophones, and other interpretive errors are inadvertently transcribed by the computer software. Please disregard these errors. Please excuse any errors that have escaped final proofreading. Thank you.

## 2022-08-31 ENCOUNTER — APPOINTMENT (OUTPATIENT)
Dept: NON INVASIVE DIAGNOSTICS | Age: 57
End: 2022-08-31
Attending: FAMILY MEDICINE
Payer: MEDICARE

## 2022-08-31 LAB
ALBUMIN SERPL-MCNC: 3.2 G/DL (ref 3.5–5)
ALBUMIN/GLOB SERPL: 1 {RATIO} (ref 1.1–2.2)
ALP SERPL-CCNC: 66 U/L (ref 45–117)
ALT SERPL-CCNC: 25 U/L (ref 12–78)
ANION GAP SERPL CALC-SCNC: 9 MMOL/L (ref 5–15)
APPEARANCE UR: ABNORMAL
AST SERPL W P-5'-P-CCNC: 12 U/L (ref 15–37)
ATRIAL RATE: 95 BPM
BACTERIA URNS QL MICRO: NEGATIVE /HPF
BASOPHILS # BLD: 0 K/UL (ref 0–0.1)
BASOPHILS NFR BLD: 0 % (ref 0–1)
BILIRUB SERPL-MCNC: 0.6 MG/DL (ref 0.2–1)
BILIRUB UR QL: NEGATIVE
BUN SERPL-MCNC: 16 MG/DL (ref 6–20)
BUN/CREAT SERPL: 22 (ref 12–20)
CA-I BLD-MCNC: 8.7 MG/DL (ref 8.5–10.1)
CALCULATED P AXIS, ECG09: 74 DEGREES
CALCULATED R AXIS, ECG10: 58 DEGREES
CALCULATED T AXIS, ECG11: 63 DEGREES
CHLORIDE SERPL-SCNC: 100 MMOL/L (ref 97–108)
CO2 SERPL-SCNC: 27 MMOL/L (ref 21–32)
COLOR UR: ABNORMAL
CREAT SERPL-MCNC: 0.74 MG/DL (ref 0.55–1.02)
DIAGNOSIS, 93000: NORMAL
DIFFERENTIAL METHOD BLD: ABNORMAL
ECHO AO ASC DIAM: 2.5 CM
ECHO AO ASCENDING AORTA INDEX: 1.18 CM/M2
ECHO AO ROOT DIAM: 2.4 CM
ECHO AO ROOT INDEX: 1.14 CM/M2
ECHO AV AREA PEAK VELOCITY: 2.5 CM2
ECHO AV AREA VTI: 2.7 CM2
ECHO AV AREA/BSA PEAK VELOCITY: 1.2 CM2/M2
ECHO AV AREA/BSA VTI: 1.3 CM2/M2
ECHO AV MEAN GRADIENT: 4 MMHG
ECHO AV MEAN VELOCITY: 1 M/S
ECHO AV PEAK GRADIENT: 7 MMHG
ECHO AV PEAK VELOCITY: 1.3 M/S
ECHO AV VELOCITY RATIO: 0.69
ECHO AV VTI: 18.6 CM
ECHO EST RA PRESSURE: 3 MMHG
ECHO LA AREA 4C: 7.3 CM2
ECHO LA DIAMETER INDEX: 1.14 CM/M2
ECHO LA DIAMETER: 2.4 CM
ECHO LA MAJOR AXIS: 3.9 CM
ECHO LA TO AORTIC ROOT RATIO: 1
ECHO LV E' LATERAL VELOCITY: 8 CM/S
ECHO LV E' SEPTAL VELOCITY: 11 CM/S
ECHO LV EDV A4C: 28 ML
ECHO LV EDV INDEX A4C: 13 ML/M2
ECHO LV EJECTION FRACTION A4C: 71 %
ECHO LV ESV A4C: 8 ML
ECHO LV ESV INDEX A4C: 4 ML/M2
ECHO LV FRACTIONAL SHORTENING: 37 % (ref 28–44)
ECHO LV INTERNAL DIMENSION DIASTOLE INDEX: 1.8 CM/M2
ECHO LV INTERNAL DIMENSION DIASTOLIC: 3.8 CM (ref 3.9–5.3)
ECHO LV INTERNAL DIMENSION SYSTOLIC INDEX: 1.14 CM/M2
ECHO LV INTERNAL DIMENSION SYSTOLIC: 2.4 CM
ECHO LV IVSD: 0.9 CM (ref 0.6–0.9)
ECHO LV MASS 2D: 109 G (ref 67–162)
ECHO LV MASS INDEX 2D: 51.7 G/M2 (ref 43–95)
ECHO LV POSTERIOR WALL DIASTOLIC: 1 CM (ref 0.6–0.9)
ECHO LV RELATIVE WALL THICKNESS RATIO: 0.53
ECHO LVOT AREA: 3.8 CM2
ECHO LVOT AV VTI INDEX: 0.7
ECHO LVOT DIAM: 2.2 CM
ECHO LVOT MEAN GRADIENT: 2 MMHG
ECHO LVOT PEAK GRADIENT: 3 MMHG
ECHO LVOT PEAK VELOCITY: 0.9 M/S
ECHO LVOT STROKE VOLUME INDEX: 23.4 ML/M2
ECHO LVOT SV: 49.4 ML
ECHO LVOT VTI: 13 CM
ECHO MV A VELOCITY: 0.69 M/S
ECHO MV AREA VTI: 2.6 CM2
ECHO MV E VELOCITY: 0.95 M/S
ECHO MV E/A RATIO: 1.38
ECHO MV E/E' LATERAL: 11.88
ECHO MV E/E' RATIO (AVERAGED): 10.26
ECHO MV E/E' SEPTAL: 8.64
ECHO MV LVOT VTI INDEX: 1.46
ECHO MV MAX VELOCITY: 1.1 M/S
ECHO MV MEAN GRADIENT: 2 MMHG
ECHO MV MEAN VELOCITY: 0.6 M/S
ECHO MV PEAK GRADIENT: 5 MMHG
ECHO MV REGURGITANT PEAK GRADIENT: 4 MMHG
ECHO MV REGURGITANT PEAK VELOCITY: 1 M/S
ECHO MV VTI: 19 CM
ECHO RIGHT VENTRICULAR SYSTOLIC PRESSURE (RVSP): 11 MMHG
ECHO RV BASAL DIMENSION: 2.4 CM
ECHO RV FREE WALL PEAK S': 11 CM/S
ECHO RV MID DIMENSION: 2.1 CM
ECHO RV TAPSE: 2 CM (ref 1.7–?)
ECHO TV REGURGITANT MAX VELOCITY: 1.38 M/S
ECHO TV REGURGITANT PEAK GRADIENT: 8 MMHG
EOSINOPHIL # BLD: 0 K/UL (ref 0–0.4)
EOSINOPHIL NFR BLD: 0 % (ref 0–7)
ERYTHROCYTE [DISTWIDTH] IN BLOOD BY AUTOMATED COUNT: 13.3 % (ref 11.5–14.5)
EST. AVERAGE GLUCOSE BLD GHB EST-MCNC: 123 MG/DL
GLOBULIN SER CALC-MCNC: 3.3 G/DL (ref 2–4)
GLUCOSE BLD STRIP.AUTO-MCNC: 146 MG/DL (ref 65–100)
GLUCOSE BLD STRIP.AUTO-MCNC: 150 MG/DL (ref 65–100)
GLUCOSE BLD STRIP.AUTO-MCNC: 151 MG/DL (ref 65–100)
GLUCOSE BLD STRIP.AUTO-MCNC: 210 MG/DL (ref 65–100)
GLUCOSE SERPL-MCNC: 170 MG/DL (ref 65–100)
GLUCOSE UR STRIP.AUTO-MCNC: NEGATIVE MG/DL
HBA1C MFR BLD: 5.9 % (ref 4–5.6)
HCT VFR BLD AUTO: 35.8 % (ref 35–47)
HGB BLD-MCNC: 11.8 G/DL (ref 11.5–16)
HGB UR QL STRIP: ABNORMAL
IMM GRANULOCYTES # BLD AUTO: 0.1 K/UL (ref 0–0.04)
IMM GRANULOCYTES NFR BLD AUTO: 1 % (ref 0–0.5)
KETONES UR QL STRIP.AUTO: 15 MG/DL
LEUKOCYTE ESTERASE UR QL STRIP.AUTO: NEGATIVE
LYMPHOCYTES # BLD: 0.9 K/UL (ref 0.8–3.5)
LYMPHOCYTES NFR BLD: 5 % (ref 12–49)
MCH RBC QN AUTO: 30.6 PG (ref 26–34)
MCHC RBC AUTO-ENTMCNC: 33 G/DL (ref 30–36.5)
MCV RBC AUTO: 92.7 FL (ref 80–99)
MONOCYTES # BLD: 0.6 K/UL (ref 0–1)
MONOCYTES NFR BLD: 4 % (ref 5–13)
MUCOUS THREADS URNS QL MICRO: ABNORMAL /LPF
NEUTS SEG # BLD: 16.7 K/UL (ref 1.8–8)
NEUTS SEG NFR BLD: 90 % (ref 32–75)
NITRITE UR QL STRIP.AUTO: NEGATIVE
NRBC # BLD: 0 K/UL (ref 0–0.01)
NRBC BLD-RTO: 0 PER 100 WBC
P-R INTERVAL, ECG05: 164 MS
PERFORMED BY, TECHID: ABNORMAL
PH UR STRIP: 5 [PH]
PLATELET # BLD AUTO: 218 K/UL (ref 150–400)
PMV BLD AUTO: 11.1 FL (ref 8.9–12.9)
POTASSIUM SERPL-SCNC: 3.5 MMOL/L (ref 3.5–5.1)
PROT SERPL-MCNC: 6.5 G/DL (ref 6.4–8.2)
PROT UR STRIP-MCNC: ABNORMAL MG/DL
Q-T INTERVAL, ECG07: 334 MS
QRS DURATION, ECG06: 78 MS
QTC CALCULATION (BEZET), ECG08: 419 MS
RBC # BLD AUTO: 3.86 M/UL (ref 3.8–5.2)
RBC #/AREA URNS HPF: ABNORMAL /HPF (ref 0–5)
SODIUM SERPL-SCNC: 136 MMOL/L (ref 136–145)
SP GR UR REFRACTOMETRY: 1.02 (ref 1–1.03)
TROPONIN-HIGH SENSITIVITY: 79 NG/L (ref 0–51)
TSH SERPL DL<=0.05 MIU/L-ACNC: 0.17 UIU/ML (ref 0.36–3.74)
UROBILINOGEN UR QL STRIP.AUTO: 1 EU/DL (ref 0.2–1)
VENTRICULAR RATE, ECG03: 95 BPM
WBC # BLD AUTO: 18.4 K/UL (ref 3.6–11)
WBC URNS QL MICRO: ABNORMAL /HPF (ref 0–4)

## 2022-08-31 PROCEDURE — 81001 URINALYSIS AUTO W/SCOPE: CPT

## 2022-08-31 PROCEDURE — 85025 COMPLETE CBC W/AUTO DIFF WBC: CPT

## 2022-08-31 PROCEDURE — 96372 THER/PROPH/DIAG INJ SC/IM: CPT

## 2022-08-31 PROCEDURE — 74011636637 HC RX REV CODE- 636/637: Performed by: FAMILY MEDICINE

## 2022-08-31 PROCEDURE — C8929 TTE W OR WO FOL WCON,DOPPLER: HCPCS

## 2022-08-31 PROCEDURE — 74011250636 HC RX REV CODE- 250/636: Performed by: FAMILY MEDICINE

## 2022-08-31 PROCEDURE — 80053 COMPREHEN METABOLIC PANEL: CPT

## 2022-08-31 PROCEDURE — 84443 ASSAY THYROID STIM HORMONE: CPT

## 2022-08-31 PROCEDURE — 84484 ASSAY OF TROPONIN QUANT: CPT

## 2022-08-31 PROCEDURE — 74011250637 HC RX REV CODE- 250/637: Performed by: FAMILY MEDICINE

## 2022-08-31 PROCEDURE — G0378 HOSPITAL OBSERVATION PER HR: HCPCS

## 2022-08-31 PROCEDURE — 87086 URINE CULTURE/COLONY COUNT: CPT

## 2022-08-31 PROCEDURE — 65270000029 HC RM PRIVATE

## 2022-08-31 PROCEDURE — 93005 ELECTROCARDIOGRAM TRACING: CPT

## 2022-08-31 PROCEDURE — 74011250636 HC RX REV CODE- 250/636

## 2022-08-31 PROCEDURE — 82962 GLUCOSE BLOOD TEST: CPT

## 2022-08-31 RX ORDER — ENOXAPARIN SODIUM 100 MG/ML
30 INJECTION SUBCUTANEOUS EVERY 12 HOURS
Status: DISCONTINUED | OUTPATIENT
Start: 2022-08-31 | End: 2022-09-02 | Stop reason: HOSPADM

## 2022-08-31 RX ADMIN — INSULIN LISPRO 2 UNITS: 100 INJECTION, SOLUTION INTRAVENOUS; SUBCUTANEOUS at 21:47

## 2022-08-31 RX ADMIN — CETIRIZINE HYDROCHLORIDE 10 MG: 10 TABLET, FILM COATED ORAL at 09:09

## 2022-08-31 RX ADMIN — HYDROCHLOROTHIAZIDE 25 MG: 25 TABLET ORAL at 09:09

## 2022-08-31 RX ADMIN — ASPIRIN 325 MG ORAL TABLET 325 MG: 325 PILL ORAL at 09:08

## 2022-08-31 RX ADMIN — ENOXAPARIN SODIUM 30 MG: 100 INJECTION SUBCUTANEOUS at 21:48

## 2022-08-31 RX ADMIN — FLUOXETINE 40 MG: 20 CAPSULE ORAL at 09:09

## 2022-08-31 RX ADMIN — PANTOPRAZOLE SODIUM 40 MG: 40 TABLET, DELAYED RELEASE ORAL at 09:14

## 2022-08-31 RX ADMIN — THERA TABS 1 TABLET: TAB at 09:09

## 2022-08-31 RX ADMIN — ACETAMINOPHEN 650 MG: 325 TABLET, FILM COATED ORAL at 09:08

## 2022-08-31 RX ADMIN — ATORVASTATIN CALCIUM 40 MG: 40 TABLET, FILM COATED ORAL at 09:08

## 2022-08-31 RX ADMIN — PREGABALIN 300 MG: 150 CAPSULE ORAL at 21:46

## 2022-08-31 RX ADMIN — PERFLUTREN 1 ML: 6.52 INJECTION, SUSPENSION INTRAVENOUS at 12:00

## 2022-08-31 RX ADMIN — ENOXAPARIN SODIUM 40 MG: 100 INJECTION SUBCUTANEOUS at 09:08

## 2022-08-31 RX ADMIN — PREGABALIN 300 MG: 150 CAPSULE ORAL at 09:08

## 2022-08-31 NOTE — ED NOTES
Report tubed to [facility] at 7697. Dennys Ferrer on [facility] notified that tube was being sent.

## 2022-08-31 NOTE — ROUTINE PROCESS
Primary Nurse Perez Barlow RN and Mally Conde RN performed a dual skin assessment on this patient No impairment noted.

## 2022-08-31 NOTE — PROGRESS NOTES
Reason for Admission:    Chest pain                   RUR Score:                   6%  Plan for utilizing home health:      None at this time    PCP: First and Last name:  Payam Caller,      Name of Practice:    Are you a current patient: Yes/No: yes   Approximate date of last visit: one month ago     Can you participate in a virtual visit with your PCP:                     Current Advanced Directive/Advance Care Plan: Full Code      Healthcare Decision Maker:   Click here to complete [address] including selection of the Healthcare Decision Maker Relationship (ie \"Primary\")             Primary Decision Maker: Pola Tafoya - Daughter - [phone]                  Transition of Care Plan:                    Patient lives in an apartment alone with no stairs to entrance. Patient does have DME and has never had hH, SNF or IRF services. Patient uses the [facility] on [address] in [facility]. Confirmed address and phone numbers with patient.

## 2022-08-31 NOTE — H&P
History and Physical    NAME: Owen Groves   :  1965   MRN:  [id number]     Date/Time:  2022 10:26 AM    Patient PCP: Manjit Puri DO  ______________________________________________________________________             Subjective:     CHIEF COMPLAINT: chest pain         HISTORY OF PRESENT ILLNESS:       Patient is a 65y.o. year old female with past medical history GERD hypercholesterolemia hypertension diabetes complains of 1 day history of non radiating substernal chest pain. Patient reports the pain is sharp, without noted aggravating relieving factors. Patient states the pain is moderate in intensity. Patient reports nausea without vomiting.    - labs  WBC 16.1  Neutrophils 87  Absolute neutrophils 14.1  Potassium 3.0  Glucose 199  Trop 116 15:51 and 229 23:06  A1c 5.9    - Imaging   XR CHEST Port- Impression: no acute process  CTA CHEST w or wo cont- Impression: 1. No evidence of pulmonary embolus. 2.  No acute thoracic pathology. Clear lungs. 3. Cardiomegaly. ECHO- impression: pending     - general   Patient was out of her room at this time.      - Labs  WBC 18.4  Neutrophils 90  Absolute neutrophil 16.7  Potassium 3.5  Neutrophils 90  Absolute neutrophils 16.7   Glucose 170      Past Medical History:   Diagnosis Date    Anemia, unspecified 2011    Autoimmune disease ([facility] [address]     fibromyalgia    Coagulation defects     anemia    Crohn's disease ([facility] [address]     Depression     Fibromyalgia     GERD (gastroesophageal reflux disease)     Hypercholesterolemia     Hypertension     Musculoskeletal disorder     Other ill-defined conditions(799.89)     crohns    Psychiatric disorder     depression    Unspecified sleep apnea     cpap        Past Surgical History:   Procedure Laterality Date    COLONOSCOPY N/A 2020    COLONOSCOPY performed by Ronald Tripathi MD at [address]  9290-20    HX OOPHORECTOMY      right       Social History     Tobacco Use Smoking status: Former     Types: Cigarettes     Quit date: 1998     Years since quittin.0    Smokeless tobacco: Never   Substance Use Topics    Alcohol use: Yes     Comment: occasional wine and cocktail        Family History   Problem Relation Age of Onset    Hypertension Mother     Dementia Mother     Migraines Mother     Hypertension Father     Heart Disease Father        Allergies   Allergen Reactions    Pcn [Penicillins] Anaphylaxis        Prior to Admission medications    Medication Sig Start Date End Date Taking? Authorizing Provider   multivitamin (ONE A DAY) tablet Take 1 Tab by mouth daily. Yes Provider, Historical   hydroCHLOROthiazide (HYDRODIURIL) 25 mg tablet Take 25 mg by mouth daily. Yes Provider, Historical   adalimumab (HUMIRA) 40 mg/0.8 mL injection pen 40 mg by SubCUTAneous route Once every 2 weeks. Yes Provider, Historical   cetirizine (ZYRTEC) 10 mg tablet Take 10 mg by mouth daily. Yes Provider, Historical   omeprazole (PRILOSEC) 40 mg capsule Take 40 mg by mouth daily. Yes Provider, Historical   atorvastatin (LIPITOR) 20 mg tablet Take 40 mg by mouth daily. Yes Provider, Historical   pregabalin (LYRICA) 300 mg capsule Take 300 mg by mouth two (2) times a day. Patient not taking: Reported on 2022    Provider, Historical   zolpidem (AMBIEN) 10 mg tablet Take  by mouth nightly as needed for Sleep. Patient not taking: Reported on 2022    Provider, Historical   SUMAtriptan-naproxen (TREXIMET)  mg tablet Take one tablet at headache onset and repeat in 2 hours x1 prn  Patient not taking: Reported on 2022   Donte Elizondo MD   FLUoxetine (PROzac) 40 mg capsule Take 40 mg by mouth daily.   Patient not taking: Reported on 2022    Provider, Historical         Current Facility-Administered Medications:     perflutren lipid microspheres (DEFINITY) 1.1 mg/mL contrast injection, , , ,     atorvastatin (LIPITOR) tablet 40 mg, 40 mg, Oral, DAILY, Jewels Richardson MD, 40 mg at 08/31/22 0908    cetirizine (ZYRTEC) tablet 10 mg, 10 mg, Oral, DAILY, Jewels Richardson MD, 10 mg at 08/31/22 8894    FLUoxetine (PROzac) capsule 40 mg, 40 mg, Oral, DAILY, Ant Cason MD, 40 mg at 08/31/22 7984    hydroCHLOROthiazide (HYDRODIURIL) tablet 25 mg, 25 mg, Oral, DAILY, Jewels Richardson MD, 25 mg at 08/31/22 0909    therapeutic multivitamin [facility]) tablet 1 Tablet, 1 Tablet, Oral, DAILY, Ant Cason MD, 1 Tablet at 08/31/22 0909    pantoprazole (PROTONIX) tablet 40 mg, 40 mg, Oral, ACB, Ant Cason MD, 40 mg at 08/31/22 4951    pregabalin (LYRICA) capsule 300 mg, 300 mg, Oral, BID, Edyta Cason MD, 300 mg at 08/31/22 0908    zolpidem (AMBIEN) tablet 5 mg, 5 mg, Oral, QHS PRN, Jewels Richardson MD    insulin lispro (HUMALOG) injection, , SubCUTAneous, AC&HS, Jewels Richardson MD    glucose chewable tablet 16 g, 4 Tablet, Oral, PRN, Edyta Cason MD    glucagon (GLUCAGEN) injection 1 mg, 1 mg, IntraMUSCular, PRN, Jewels Richardson MD    acetaminophen (TYLENOL) tablet 650 mg, 650 mg, Oral, Q6H PRN, 650 mg at 08/31/22 0908 **OR** acetaminophen (TYLENOL) suppository 650 mg, 650 mg, Rectal, Q6H PRN, Ant Cason MD    polyethylene glycol (MIRALAX) packet 17 g, 17 g, Oral, DAILY PRN, Ant Cason MD    ondansetron (ZOFRAN ODT) tablet 4 mg, 4 mg, Oral, Q8H PRN **OR** ondansetron (ZOFRAN) injection 4 mg, 4 mg, IntraVENous, Q6H PRN, Ant Cason MD    enoxaparin (LOVENOX) injection 40 mg, 40 mg, SubCUTAneous, DAILY, Ant Cason MD, 40 mg at 08/31/22 3088    aspirin tablet 325 mg, 325 mg, Oral, DAILY, Ant Cason MD, 325 mg at 08/31/22 0908    LAB DATA REVIEWED:    Recent Results (from the past 24 hour(s))   CBC WITH AUTOMATED DIFF    Collection Time: 08/30/22  3:51 PM   Result Value Ref Range    WBC 16.1 (H) 3.6 - 11.0 K/uL    RBC 3.99 3.80 - 5.20 M/uL    HGB 12.2 11.5 - 16.0 g/dL    HCT 37.0 35.0 - 47.0 %    MCV 92.7 80.0 - 99.0 FL    MCH 30.6 26.0 - 34.0 PG    MCHC 33.0 30.0 - 36.5 g/dL    RDW 12.9 11.5 - 14.5 %    PLATELET 390 062 - 998 K/uL    MPV 10.9 8.9 - 12.9 FL    NRBC 0.0 0.0  WBC    ABSOLUTE NRBC 0.00 0.00 - 0.01 K/uL    NEUTROPHILS 87 (H) 32 - 75 %    LYMPHOCYTES 7 (L) 12 - 49 %    MONOCYTES 4 (L) 5 - 13 %    EOSINOPHILS 1 0 - 7 %    BASOPHILS 0 0 - 1 %    IMMATURE GRANULOCYTES 1 (H) 0 - 0.5 %    ABS. NEUTROPHILS 14.1 (H) 1.8 - 8.0 K/UL    ABS. LYMPHOCYTES 1.0 0.8 - 3.5 K/UL    ABS. MONOCYTES 0.7 0.0 - 1.0 K/UL    ABS. EOSINOPHILS 0.1 0.0 - 0.4 K/UL    ABS. BASOPHILS 0.0 0.0 - 0.1 K/UL    ABS. IMM. GRANS. 0.1 (H) 0.00 - 0.04 K/UL    DF AUTOMATED     METABOLIC PANEL, COMPREHENSIVE    Collection Time: 08/30/22  3:51 PM   Result Value Ref Range    Sodium 137 136 - 145 mmol/L    Potassium 3.0 (L) 3.5 - 5.1 mmol/L    Chloride 102 97 - 108 mmol/L    CO2 28 21 - 32 mmol/L    Anion gap 7 5 - 15 mmol/L    Glucose 199 (H) 65 - 100 mg/dL    BUN 17 6 - 20 mg/dL    Creatinine 0.74 0.55 - 1.02 mg/dL    BUN/Creatinine ratio 23 (H) 12 - 20      GFR est AA >60 >60 ml/min/1.73m2    GFR est non-AA >60 >60 ml/min/1.73m2    Calcium 8.8 8.5 - 10.1 mg/dL    Bilirubin, total 0.3 0.2 - 1.0 mg/dL    AST (SGOT) 13 (L) 15 - 37 U/L    ALT (SGPT) 24 12 - 78 U/L    Alk.  phosphatase 70 45 - 117 U/L    Protein, total 7.0 6.4 - 8.2 g/dL    Albumin 3.4 (L) 3.5 - 5.0 g/dL    Globulin 3.6 2.0 - 4.0 g/dL    A-G Ratio 0.9 (L) 1.1 - 2.2     TROPONIN-HIGH SENSITIVITY    Collection Time: 08/30/22  3:51 PM   Result Value Ref Range    Troponin-High Sensitivity 116 (H) 0 - 51 ng/L   ECHO ADULT COMPLETE    Collection Time: 08/30/22 10:13 PM   Result Value Ref Range    LV EDV A4C 28 mL    LV ESV A4C 8 mL    IVSd 0.9 0.6 - 0.9 cm    LVIDd 3.8 (A) 3.9 - 5.3 cm    LVIDs 2.4 cm    LVOT Diameter 2.2 cm    LVOT Mean Gradient 2 mmHg    LVOT VTI 13.0 cm    LVOT Peak Velocity 0.9 m/s    LVOT Peak Gradient 3 mmHg    LVPWd 1.0 (A) 0.6 - 0.9 cm    LV E' Lateral Velocity 8 cm/s    LV E' Septal Velocity 11 cm/s    LV Ejection Fraction A4C 71 %    LVOT Area 3.8 cm2    LVOT SV 49.0 ml    LA Major Axis 3.9 cm    LA Area 4C 7.3 cm2    LA Diameter 2.4 cm    AV Mean Gradient 4 mmHg    AV VTI 18.6 cm    AV Mean Velocity 1.0 m/s    AV Peak Velocity 1.3 m/s    AV Peak Gradient 7 mmHg    AV Area by VTI 2.7 cm2    AV Area by Peak Velocity 2.5 cm2    Aortic Root 2.4 cm    Ascending Aorta 2.5 cm    MR Peak Velocity 1.0 m/s    MR Peak Gradient 4 mmHg    MV Max Velocity 1.1 m/s    MV Peak Gradient 5 mmHg    MV A Velocity 0.69 m/s    MV E Velocity 0.95 m/s    MV Mean Gradient 2 mmHg    MV VTI 19.0 cm    MV Mean Velocity 0.6 m/s    MV Area by PHT 4.3 cm2    MV Area by VTI 2.6 cm2    RV Basal Dimension 2.4 cm    RV Mid Dimension 2.1 cm    TAPSE 2.0 1.7 cm    TR Max Velocity 1.38 m/s    TR Peak Gradient 8 mmHg   HEMOGLOBIN A1C WITH EAG    Collection Time: 08/30/22 11:06 PM   Result Value Ref Range    Hemoglobin A1c 5.9 (H) 4.0 - 5.6 %    Est. average glucose 123 mg/dL   TROPONIN-HIGH SENSITIVITY    Collection Time: 08/30/22 11:06 PM   Result Value Ref Range    Troponin-High Sensitivity 229 (HH) 0 - 51 ng/L   METABOLIC PANEL, COMPREHENSIVE    Collection Time: 08/31/22  3:44 AM   Result Value Ref Range    Sodium 136 136 - 145 mmol/L    Potassium 3.5 3.5 - 5.1 mmol/L    Chloride 100 97 - 108 mmol/L    CO2 27 21 - 32 mmol/L    Anion gap 9 5 - 15 mmol/L    Glucose 170 (H) 65 - 100 mg/dL    BUN 16 6 - 20 mg/dL    Creatinine 0.74 0.55 - 1.02 mg/dL    BUN/Creatinine ratio 22 (H) 12 - 20      GFR est AA >60 >60 ml/min/1.73m2    GFR est non-AA >60 >60 ml/min/1.73m2    Calcium 8.7 8.5 - 10.1 mg/dL    Bilirubin, total 0.6 0.2 - 1.0 mg/dL    AST (SGOT) 12 (L) 15 - 37 U/L    ALT (SGPT) 25 12 - 78 U/L    Alk.  phosphatase 66 45 - 117 U/L    Protein, total 6.5 6.4 - 8.2 g/dL    Albumin 3.2 (L) 3.5 - 5.0 g/dL    Globulin 3.3 2.0 - 4.0 g/dL    A-G Ratio 1.0 (L) 1.1 - 2.2     CBC WITH AUTOMATED DIFF    Collection Time: 08/31/22  3:44 AM   Result Value Ref Range    WBC 18.4 (H) 3.6 - 11.0 K/uL    RBC 3.86 3.80 - 5.20 M/uL    HGB 11.8 11.5 - 16.0 g/dL    HCT 35.8 35.0 - 47.0 %    MCV 92.7 80.0 - 99.0 FL    MCH 30.6 26.0 - 34.0 PG    MCHC 33.0 30.0 - 36.5 g/dL    RDW 13.3 11.5 - 14.5 %    PLATELET 821 111 - 799 K/uL    MPV 11.1 8.9 - 12.9 FL    NRBC 0.0 0.0  WBC    ABSOLUTE NRBC 0.00 0.00 - 0.01 K/uL    NEUTROPHILS 90 (H) 32 - 75 %    LYMPHOCYTES 5 (L) 12 - 49 %    MONOCYTES 4 (L) 5 - 13 %    EOSINOPHILS 0 0 - 7 %    BASOPHILS 0 0 - 1 %    IMMATURE GRANULOCYTES 1 (H) 0 - 0.5 %    ABS. NEUTROPHILS 16.7 (H) 1.8 - 8.0 K/UL    ABS. LYMPHOCYTES 0.9 0.8 - 3.5 K/UL    ABS. MONOCYTES 0.6 0.0 - 1.0 K/UL    ABS. EOSINOPHILS 0.0 0.0 - 0.4 K/UL    ABS. BASOPHILS 0.0 0.0 - 0.1 K/UL    ABS. IMM. GRANS. 0.1 (H) 0.00 - 0.04 K/UL    DF AUTOMATED     GLUCOSE, POC    Collection Time: 08/31/22  8:05 AM   Result Value Ref Range    Glucose (POC) 151 (H) 65 - 100 mg/dL    Performed by Courtney Mac        XR Results (most recent):  Results from Hospital Encounter encounter on 08/30/22    XR CHEST PORT    Narrative  Indication: Chest pain    Comparison: None    Portable exam of the chest obtained at 1552 demonstrates normal heart size. There is no acute process in the lung fields. The osseous structures are  unremarkable. Impression  No acute process. CTA CHEST W OR W WO CONT   Final Result   1. No evidence of pulmonary embolus. 2.  No acute thoracic pathology. Clear lungs. 3. Cardiomegaly. XR CHEST PORT   Final Result   No acute process. Review of Systems:  Constitutional: Negative for chills and fever. HENT: Negative. Eyes: Negative. Respiratory: Negative. Cardiovascular: Negative. Gastrointestinal: Negative for abdominal pain and nausea. Skin: Negative. Neurological: Negative.       Objective:   VITALS:    Visit Vitals  /62 (BP 1 Location: Left upper arm, BP Patient Position: Semi fowlers; At rest)   Pulse 100   Temp 99.2 °F (37.3 °C)   Resp 16   Ht 5' 1\" (1.549 m)   Wt 117.4 kg (258 lb 13.1 oz)   SpO2 96%   BMI 48.90 kg/m²       Physical Exam:   Constitutional: pt is oriented to person, place, and time. HENT:   Head: Normocephalic and atraumatic. Eyes: Pupils are equal, round, and reactive to light. EOM are normal.   Cardiovascular: Normal rate, regular rhythm and normal heart sounds. Pulmonary/Chest: Breath sounds normal. No wheezes. No rales. Exhibits no tenderness. Abdominal: Soft. Bowel sounds are normal. There is no abdominal tenderness. There is no rebound and no guarding. Musculoskeletal: Normal range of motion. Neurological: pt is alert and oriented to person, place, and time. Alert. Normal strength. No cranial nerve deficit or sensory deficit. Displays a negative Romberg sign.         ASSESSMENT:  MI r/o   hypercholesterolemia   hypertension   Diabetes  Leukocytosis    PLAN:  Consult to cardiology- pending   Echo- pending  Order EKG  Continue to monitor HH, electrolytes, and kidney function   Continue current medications      Medications:  Aspirin tablet 325mg  Atorvastatin tablet 40mg  Zyrtec tablet 10mg  Lovenox injection 40mg  Prozac capsule 40mg  Hydrochlorothiazide tablet 25mg  Humalog injection   Protonix tablet 40mg   Vale capsule 300mg   Theragran tablet   PRN ambien tablet 5mg    Current Facility-Administered Medications:     perflutren lipid microspheres (DEFINITY) 1.1 mg/mL contrast injection, , , ,     atorvastatin (LIPITOR) tablet 40 mg, 40 mg, Oral, DAILY, Ant Cason MD, 40 mg at 08/31/22 0908    cetirizine (ZYRTEC) tablet 10 mg, 10 mg, Oral, DAILY, Ant Cason MD, 10 mg at 08/31/22 8227    FLUoxetine (PROzac) capsule 40 mg, 40 mg, Oral, DAILY, Ant Cason MD, 40 mg at 08/31/22 5799    hydroCHLOROthiazide (HYDRODIURIL) tablet 25 mg, 25 mg, Oral, DAILY, Ant Cason MD, 25 mg at 08/31/22 0909    therapeutic multivitamin [facility]) tablet 1 Tablet, 1 Tablet, Oral, DAILY, Ant Cason MD, 1 Tablet at 08/31/22 0909    pantoprazole (PROTONIX) tablet 40 mg, 40 mg, Oral, ACB, Ant Cason MD, 40 mg at 08/31/22 0914    pregabalin (LYRICA) capsule 300 mg, 300 mg, Oral, BID, Enrique Cason MD, 300 mg at 08/31/22 0908    zolpidem (AMBIEN) tablet 5 mg, 5 mg, Oral, QHS PRN, Enrique Cason MD    insulin lispro (HUMALOG) injection, , SubCUTAneous, AC&HS, Enrique Cason MD    glucose chewable tablet 16 g, 4 Tablet, Oral, PRN, Enrique Cason MD    glucagon (GLUCAGEN) injection 1 mg, 1 mg, IntraMUSCular, PRN, Enrique Cason MD    acetaminophen (TYLENOL) tablet 650 mg, 650 mg, Oral, Q6H PRN, 650 mg at 08/31/22 0908 **OR** acetaminophen (TYLENOL) suppository 650 mg, 650 mg, Rectal, Q6H PRN, Ant Cason MD    polyethylene glycol (MIRALAX) packet 17 g, 17 g, Oral, DAILY PRN, Ant Cason MD    ondansetron (ZOFRAN ODT) tablet 4 mg, 4 mg, Oral, Q8H PRN **OR** ondansetron (ZOFRAN) injection 4 mg, 4 mg, IntraVENous, Q6H PRN, Ant Cason MD    enoxaparin (LOVENOX) injection 40 mg, 40 mg, SubCUTAneous, DAILY, Ant Cason MD, 40 mg at 08/31/22 8942    aspirin tablet 325 mg, 325 mg, Oral, DAILY, Ant Cason MD, 325 mg at 08/31/22 0936     ________________________________________________________________________    Signed: Radha Medina MD

## 2022-08-31 NOTE — H&P
History and Physical    NAME: Alexis Ellis   :  1965   MRN:  [id number]     Date/Time:  2022 10:26 AM    Patient PCP: Jayson Pyle, DO  ______________________________________________________________________             Subjective:     CHIEF COMPLAINT: chest pain         HISTORY OF PRESENT ILLNESS:       Patient is a 65y.o. year old female with past medical history GERD hypercholesterolemia hypertension diabetes complains of 1 day history of non radiating substernal chest pain. Patient reports the pain is sharp, without noted aggravating relieving factors. Patient states the pain is moderate in intensity. Patient reports nausea without vomiting.    - labs  WBC 16.1  Neutrophils 87  Absolute neutrophils 14.1  Potassium 3.0  Glucose 199  Trop 116 15:51 and 229 23:06  A1c 5.9    - Imaging   XR CHEST Port- Impression: no acute process  CTA CHEST w or wo cont- Impression: 1. No evidence of pulmonary embolus. 2.  No acute thoracic pathology. Clear lungs. 3. Cardiomegaly. ECHO- impression: pending     - general   Patient was out of her room at this time.      - Labs  WBC 18.4  Neutrophils 90  Absolute neutrophil 16.7  Potassium 3.5  Neutrophils 90  Absolute neutrophils 16.7   Glucose 170      Past Medical History:   Diagnosis Date    Anemia, unspecified 2011    Autoimmune disease ([facility] [address]     fibromyalgia    Coagulation defects     anemia    Crohn's disease ([facility] [address]     Depression     Fibromyalgia     GERD (gastroesophageal reflux disease)     Hypercholesterolemia     Hypertension     Musculoskeletal disorder     Other ill-defined conditions(799.89)     crohns    Psychiatric disorder     depression    Unspecified sleep apnea     cpap        Past Surgical History:   Procedure Laterality Date    COLONOSCOPY N/A 2020    COLONOSCOPY performed by Edgar Martini MD at [address]  5054-51    HX OOPHORECTOMY      right       Social History     Tobacco Use Smoking status: Former     Types: Cigarettes     Quit date: 1998     Years since quittin.0    Smokeless tobacco: Never   Substance Use Topics    Alcohol use: Yes     Comment: occasional wine and cocktail        Family History   Problem Relation Age of Onset    Hypertension Mother     Dementia Mother     Migraines Mother     Hypertension Father     Heart Disease Father        Allergies   Allergen Reactions    Pcn [Penicillins] Anaphylaxis        Prior to Admission medications    Medication Sig Start Date End Date Taking? Authorizing Provider   multivitamin (ONE A DAY) tablet Take 1 Tab by mouth daily. Yes Provider, Historical   hydroCHLOROthiazide (HYDRODIURIL) 25 mg tablet Take 25 mg by mouth daily. Yes Provider, Historical   adalimumab (HUMIRA) 40 mg/0.8 mL injection pen 40 mg by SubCUTAneous route Once every 2 weeks. Yes Provider, Historical   cetirizine (ZYRTEC) 10 mg tablet Take 10 mg by mouth daily. Yes Provider, Historical   omeprazole (PRILOSEC) 40 mg capsule Take 40 mg by mouth daily. Yes Provider, Historical   atorvastatin (LIPITOR) 20 mg tablet Take 40 mg by mouth daily. Yes Provider, Historical   pregabalin (LYRICA) 300 mg capsule Take 300 mg by mouth two (2) times a day. Patient not taking: Reported on 2022    Provider, Historical   zolpidem (AMBIEN) 10 mg tablet Take  by mouth nightly as needed for Sleep. Patient not taking: Reported on 2022    Provider, Historical   SUMAtriptan-naproxen (TREXIMET)  mg tablet Take one tablet at headache onset and repeat in 2 hours x1 prn  Patient not taking: Reported on 2022   Donte Elizondo MD   FLUoxetine (PROzac) 40 mg capsule Take 40 mg by mouth daily.   Patient not taking: Reported on 2022    Provider, Historical         Current Facility-Administered Medications:     atorvastatin (LIPITOR) tablet 40 mg, 40 mg, Oral, DAILY, Ant Cason MD, 40 mg at 22 0908    cetirizine (ZYRTEC) tablet 10 mg, 10 mg, Oral, DAILY, Blanca Ji MD, 10 mg at 08/31/22 8147    FLUoxetine (PROzac) capsule 40 mg, 40 mg, Oral, DAILY, Ant Cason MD, 40 mg at 08/31/22 4545    hydroCHLOROthiazide (HYDRODIURIL) tablet 25 mg, 25 mg, Oral, DAILY, Blanca Ji MD, 25 mg at 08/31/22 0909    therapeutic multivitamin [facility]) tablet 1 Tablet, 1 Tablet, Oral, DAILY, Ant Cason MD, 1 Tablet at 08/31/22 2165    pantoprazole (PROTONIX) tablet 40 mg, 40 mg, Oral, ACB, Ant Cason MD, 40 mg at 08/31/22 7862    pregabalin (LYRICA) capsule 300 mg, 300 mg, Oral, BID, Ant Cason MD, 300 mg at 08/31/22 0908    zolpidem (AMBIEN) tablet 5 mg, 5 mg, Oral, QHS PRN, Blanca Ji MD    insulin lispro (HUMALOG) injection, , SubCUTAneous, AC&HS, Blanca Ji MD    glucose chewable tablet 16 g, 4 Tablet, Oral, PRN, Ant Cason MD    glucagon (GLUCAGEN) injection 1 mg, 1 mg, IntraMUSCular, PRN, Blanca Ji MD    acetaminophen (TYLENOL) tablet 650 mg, 650 mg, Oral, Q6H PRN, 650 mg at 08/31/22 0908 **OR** acetaminophen (TYLENOL) suppository 650 mg, 650 mg, Rectal, Q6H PRN, Ant Cason MD    polyethylene glycol (MIRALAX) packet 17 g, 17 g, Oral, DAILY PRN, Ant Cason MD    ondansetron (ZOFRAN ODT) tablet 4 mg, 4 mg, Oral, Q8H PRN **OR** ondansetron (ZOFRAN) injection 4 mg, 4 mg, IntraVENous, Q6H PRN, Ant Cason MD    enoxaparin (LOVENOX) injection 40 mg, 40 mg, SubCUTAneous, DAILY, Ant Cason MD, 40 mg at 08/31/22 7802    aspirin tablet 325 mg, 325 mg, Oral, DAILY, Ant Cason MD, 325 mg at 08/31/22 0908    LAB DATA REVIEWED:    Recent Results (from the past 24 hour(s))   CBC WITH AUTOMATED DIFF    Collection Time: 08/30/22  3:51 PM   Result Value Ref Range    WBC 16.1 (H) 3.6 - 11.0 K/uL    RBC 3.99 3.80 - 5.20 M/uL    HGB 12.2 11.5 - 16.0 g/dL    HCT 37.0 35.0 - 47.0 %    MCV 92.7 80.0 - 99.0 FL    MCH 30.6 26.0 - 34.0 PG    MCHC 33.0 30.0 - 36.5 g/dL    RDW 12.9 11.5 - 14.5 %    PLATELET 129 701 - 713 K/uL    MPV 10.9 8.9 - 12.9 FL    NRBC 0.0 0.0  WBC    ABSOLUTE NRBC 0.00 0.00 - 0.01 K/uL    NEUTROPHILS 87 (H) 32 - 75 %    LYMPHOCYTES 7 (L) 12 - 49 %    MONOCYTES 4 (L) 5 - 13 %    EOSINOPHILS 1 0 - 7 %    BASOPHILS 0 0 - 1 %    IMMATURE GRANULOCYTES 1 (H) 0 - 0.5 %    ABS. NEUTROPHILS 14.1 (H) 1.8 - 8.0 K/UL    ABS. LYMPHOCYTES 1.0 0.8 - 3.5 K/UL    ABS. MONOCYTES 0.7 0.0 - 1.0 K/UL    ABS. EOSINOPHILS 0.1 0.0 - 0.4 K/UL    ABS. BASOPHILS 0.0 0.0 - 0.1 K/UL    ABS. IMM. GRANS. 0.1 (H) 0.00 - 0.04 K/UL    DF AUTOMATED     METABOLIC PANEL, COMPREHENSIVE    Collection Time: 08/30/22  3:51 PM   Result Value Ref Range    Sodium 137 136 - 145 mmol/L    Potassium 3.0 (L) 3.5 - 5.1 mmol/L    Chloride 102 97 - 108 mmol/L    CO2 28 21 - 32 mmol/L    Anion gap 7 5 - 15 mmol/L    Glucose 199 (H) 65 - 100 mg/dL    BUN 17 6 - 20 mg/dL    Creatinine 0.74 0.55 - 1.02 mg/dL    BUN/Creatinine ratio 23 (H) 12 - 20      GFR est AA >60 >60 ml/min/1.73m2    GFR est non-AA >60 >60 ml/min/1.73m2    Calcium 8.8 8.5 - 10.1 mg/dL    Bilirubin, total 0.3 0.2 - 1.0 mg/dL    AST (SGOT) 13 (L) 15 - 37 U/L    ALT (SGPT) 24 12 - 78 U/L    Alk.  phosphatase 70 45 - 117 U/L    Protein, total 7.0 6.4 - 8.2 g/dL    Albumin 3.4 (L) 3.5 - 5.0 g/dL    Globulin 3.6 2.0 - 4.0 g/dL    A-G Ratio 0.9 (L) 1.1 - 2.2     TROPONIN-HIGH SENSITIVITY    Collection Time: 08/30/22  3:51 PM   Result Value Ref Range    Troponin-High Sensitivity 116 (H) 0 - 51 ng/L   TROPONIN-HIGH SENSITIVITY    Collection Time: 08/30/22 11:06 PM   Result Value Ref Range    Troponin-High Sensitivity 229 (HH) 0 - 51 ng/L   METABOLIC PANEL, COMPREHENSIVE    Collection Time: 08/31/22  3:44 AM   Result Value Ref Range    Sodium 136 136 - 145 mmol/L    Potassium 3.5 3.5 - 5.1 mmol/L    Chloride 100 97 - 108 mmol/L    CO2 27 21 - 32 mmol/L    Anion gap 9 5 - 15 mmol/L    Glucose 170 (H) 65 - 100 mg/dL    BUN 16 6 - 20 mg/dL    Creatinine 0.74 0.55 - 1.02 mg/dL    BUN/Creatinine ratio 22 (H) 12 - 20      GFR est AA >60 >60 ml/min/1.73m2    GFR est non-AA >60 >60 ml/min/1.73m2    Calcium 8.7 8.5 - 10.1 mg/dL    Bilirubin, total 0.6 0.2 - 1.0 mg/dL    AST (SGOT) 12 (L) 15 - 37 U/L    ALT (SGPT) 25 12 - 78 U/L    Alk. phosphatase 66 45 - 117 U/L    Protein, total 6.5 6.4 - 8.2 g/dL    Albumin 3.2 (L) 3.5 - 5.0 g/dL    Globulin 3.3 2.0 - 4.0 g/dL    A-G Ratio 1.0 (L) 1.1 - 2.2     CBC WITH AUTOMATED DIFF    Collection Time: 08/31/22  3:44 AM   Result Value Ref Range    WBC 18.4 (H) 3.6 - 11.0 K/uL    RBC 3.86 3.80 - 5.20 M/uL    HGB 11.8 11.5 - 16.0 g/dL    HCT 35.8 35.0 - 47.0 %    MCV 92.7 80.0 - 99.0 FL    MCH 30.6 26.0 - 34.0 PG    MCHC 33.0 30.0 - 36.5 g/dL    RDW 13.3 11.5 - 14.5 %    PLATELET 842 843 - 152 K/uL    MPV 11.1 8.9 - 12.9 FL    NRBC 0.0 0.0  WBC    ABSOLUTE NRBC 0.00 0.00 - 0.01 K/uL    NEUTROPHILS 90 (H) 32 - 75 %    LYMPHOCYTES 5 (L) 12 - 49 %    MONOCYTES 4 (L) 5 - 13 %    EOSINOPHILS 0 0 - 7 %    BASOPHILS 0 0 - 1 %    IMMATURE GRANULOCYTES 1 (H) 0 - 0.5 %    ABS. NEUTROPHILS 16.7 (H) 1.8 - 8.0 K/UL    ABS. LYMPHOCYTES 0.9 0.8 - 3.5 K/UL    ABS. MONOCYTES 0.6 0.0 - 1.0 K/UL    ABS. EOSINOPHILS 0.0 0.0 - 0.4 K/UL    ABS. BASOPHILS 0.0 0.0 - 0.1 K/UL    ABS. IMM. GRANS. 0.1 (H) 0.00 - 0.04 K/UL    DF AUTOMATED     GLUCOSE, POC    Collection Time: 08/31/22  8:05 AM   Result Value Ref Range    Glucose (POC) 151 (H) 65 - 100 mg/dL    Performed by Rosa Oviedo        XR Results (most recent):  Results from Hospital Encounter encounter on 08/30/22    XR CHEST PORT    Narrative  Indication: Chest pain    Comparison: None    Portable exam of the chest obtained at 1552 demonstrates normal heart size. There is no acute process in the lung fields. The osseous structures are  unremarkable. Impression  No acute process. CTA CHEST W OR W WO CONT   Final Result   1. No evidence of pulmonary embolus. 2.  No acute thoracic pathology. Clear lungs. 3. Cardiomegaly. XR CHEST PORT   Final Result   No acute process. Review of Systems:  Constitutional: Negative for chills and fever. HENT: Negative. Eyes: Negative. Respiratory: Negative. Cardiovascular: Negative. Gastrointestinal: Negative for abdominal pain and nausea. Skin: Negative. Neurological: Negative. Objective:   VITALS:    Visit Vitals  BP (!) 113/51 (BP 1 Location: Left upper arm, BP Patient Position: Semi fowlers)   Pulse (!) 111   Temp 99.1 °F (37.3 °C)   Resp 18   Ht 5' 1\" (1.549 m)   Wt 117.4 kg (258 lb 13.1 oz)   SpO2 97%   BMI 48.90 kg/m²       Physical Exam:   Constitutional: pt is oriented to person, place, and time. HENT:   Head: Normocephalic and atraumatic. Eyes: Pupils are equal, round, and reactive to light. EOM are normal.   Cardiovascular: Normal rate, regular rhythm and normal heart sounds. Pulmonary/Chest: Breath sounds normal. No wheezes. No rales. Exhibits no tenderness. Abdominal: Soft. Bowel sounds are normal. There is no abdominal tenderness. There is no rebound and no guarding. Musculoskeletal: Normal range of motion. Neurological: pt is alert and oriented to person, place, and time. Alert. Normal strength. No cranial nerve deficit or sensory deficit. Displays a negative Romberg sign.         ASSESSMENT:  MI r/o   hypercholesterolemia   hypertension   diabetes    PLAN:  Consult to cardiology- pending   Echo- pending  Order EKG  Continue to monitor HH, electrolytes, and kidney function   Continue current medications      Medications:  Aspirin tablet 325mg  Atorvastatin tablet 40mg  Zyrtec tablet 10mg  Lovenox injection 40mg  Prozac capsule 40mg  Hydrochlorothiazide tablet 25mg  Humalog injection   Protonix tablet 40mg   Vale capsule 300mg   Theragran tablet   PRN ambien tablet 5mg    Current Facility-Administered Medications:     atorvastatin (LIPITOR) tablet 40 mg, 40 mg, Oral, DAILY, Shante Cason MD, 40 mg at 08/31/22 0908    cetirizine (ZYRTEC) tablet 10 mg, 10 mg, Oral, DAILY, Ant Cason MD, 10 mg at 08/31/22 2613    FLUoxetine (PROzac) capsule 40 mg, 40 mg, Oral, DAILY, Ant Cason MD, 40 mg at 08/31/22 4775    hydroCHLOROthiazide (HYDRODIURIL) tablet 25 mg, 25 mg, Oral, DAILY, Jennyfer Sheehan MD, 25 mg at 08/31/22 0909    therapeutic multivitamin [facility]) tablet 1 Tablet, 1 Tablet, Oral, DAILY, Ant Cason MD, 1 Tablet at 08/31/22 0909    pantoprazole (PROTONIX) tablet 40 mg, 40 mg, Oral, ACB, Ant Cason MD, 40 mg at 08/31/22 5525    pregabalin (LYRICA) capsule 300 mg, 300 mg, Oral, BID, Iva Cason MD, 300 mg at 08/31/22 0908    zolpidem (AMBIEN) tablet 5 mg, 5 mg, Oral, QHS PRN, Jennyfer Sheehan MD    insulin lispro (HUMALOG) injection, , SubCUTAneous, AC&HS, Iva Cason MD    glucose chewable tablet 16 g, 4 Tablet, Oral, PRN, Iva Cason MD    glucagon (GLUCAGEN) injection 1 mg, 1 mg, IntraMUSCular, PRN, Jennyfer Sheehan MD    acetaminophen (TYLENOL) tablet 650 mg, 650 mg, Oral, Q6H PRN, 650 mg at 08/31/22 0908 **OR** acetaminophen (TYLENOL) suppository 650 mg, 650 mg, Rectal, Q6H PRN, Ant Cason MD    polyethylene glycol (MIRALAX) packet 17 g, 17 g, Oral, DAILY PRN, Ant Cason MD    ondansetron (ZOFRAN ODT) tablet 4 mg, 4 mg, Oral, Q8H PRN **OR** ondansetron (ZOFRAN) injection 4 mg, 4 mg, IntraVENous, Q6H PRN, Ant Cason MD    enoxaparin (LOVENOX) injection 40 mg, 40 mg, SubCUTAneous, DAILY, Ant Cason MD, 40 mg at 08/31/22 3398    aspirin tablet 325 mg, 325 mg, Oral, DAILY, Ant Cason MD, 325 mg at 08/31/22 5190     ________________________________________________________________________    Signed: Sherron Peralta

## 2022-08-31 NOTE — CONSULTS
CONSULTATION    REASON FOR CONSULT:  Chest pain     REQUESTING PROVIDER:  Dr. La Hoang:    Chief Complaint   Patient presents with    Chest Pain    Nausea         HISTORY OF PRESENT ILLNESS:  Selam Nurse is a 65y.o. year-old female with past medical history significant for hypertension, hyperlipidemia, obstructive sleep apnea, fibromyalgia, depression, and Crohn's disease who was admitted to the hospital for further evaluation of chest pain for the past 24 hours. On examination, patient is sitting upright in the bed, does not appear to be in any acute distress. Patient states she developed sharp, non-radiating chest pain while she was eating. Associated factors include nausea. There were no aggravating or relieving factors prompting the patient to seek medical treatment. Significant labs include: HS troponin 116 >229. Cxr without congestion, CTA negative for PE, EKG: NSR: without ischemia. Records from hospital admission course thus far reviewed. Telemetry Review: No acute events noted since admission. Remains in NSR.        PAST MEDICAL HISTORY:    Past Medical History:   Diagnosis Date    Anemia, unspecified 8/20/2011    Autoimmune disease ([facility] [address]     fibromyalgia    Coagulation defects     anemia    Crohn's disease ([facility] [address]     Depression     Fibromyalgia     GERD (gastroesophageal reflux disease)     Hypercholesterolemia     Hypertension     Musculoskeletal disorder     Other ill-defined conditions(799.89)     crohns    Psychiatric disorder     depression    Unspecified sleep apnea     cpap       PAST SURGICAL HISTORY:   Past Surgical History:   Procedure Laterality Date    COLONOSCOPY N/A 8/24/2020    COLONOSCOPY performed by Khalida Hope MD at [facility] ENDOSCOPY    HX CHOLECYSTECTOMY  7077-48    HX OOPHORECTOMY  2003    right       ALLERGIES:    Allergies   Allergen Reactions    Pcn [Penicillins] Anaphylaxis       FAMILY HISTORY:    Family History   Problem Relation Age of Onset    Hypertension Mother     Dementia Mother     Migraines Mother     Hypertension Father     Heart Disease Father        SOCIAL HISTORY:    Tobacco: Former smoker  Drugs: Not documented  ETOH: Not documented    HOME MEDICATIONS:    Prior to Admission Medications   Prescriptions Last Dose Informant Patient Reported? Taking? FLUoxetine (PROzac) 40 mg capsule Not Taking  Yes No   Sig: Take 40 mg by mouth daily. Patient not taking: Reported on 2022   SUMAtriptan-naproxen (TREXIMET)  mg tablet Not Taking  No No   Sig: Take one tablet at headache onset and repeat in 2 hours x1 prn   Patient not taking: Reported on 2022   adalimumab (HUMIRA) 40 mg/0.8 mL injection pen 2022  Yes Yes   Si mg by SubCUTAneous route Once every 2 weeks. atorvastatin (LIPITOR) 20 mg tablet 2022  Yes Yes   Sig: Take 40 mg by mouth daily. cetirizine (ZYRTEC) 10 mg tablet 2022  Yes Yes   Sig: Take 10 mg by mouth daily. hydroCHLOROthiazide (HYDRODIURIL) 25 mg tablet 2022  Yes Yes   Sig: Take 25 mg by mouth daily. multivitamin (ONE A DAY) tablet 2022  Yes Yes   Sig: Take 1 Tab by mouth daily. omeprazole (PRILOSEC) 40 mg capsule 2022  Yes Yes   Sig: Take 40 mg by mouth daily. pregabalin (LYRICA) 300 mg capsule Unknown  Yes No   Sig: Take 300 mg by mouth two (2) times a day. Patient not taking: Reported on 2022   zolpidem (AMBIEN) 10 mg tablet Not Taking  Yes No   Sig: Take  by mouth nightly as needed for Sleep. Patient not taking: Reported on 2022      Facility-Administered Medications: None       REVIEW OF SYSTEMS:  Complete review of systems performed, pertinents noted above, all other systems are negative.     Patient Vitals for the past 24 hrs:   Temp Pulse Resp BP SpO2   22 1137 99.2 °F (37.3 °C) 100 16 109/62 96 %   22 0808 99.1 °F (37.3 °C) (!) 111 18 (!) 113/51 97 %   22 0400 -- (!) 109 -- -- --   22 0354 99.6 °F (37.6 °C) (!) 318 39 101/64 99 %   08/31/22 0103 99.5 °F (37.5 °C) (!) 109 16 (!) 93/57 95 %   08/31/22 0000 -- (!) 109 -- -- --   08/30/22 2352 -- (!) 111 -- (!) 117/59 93 %   08/30/22 2309 -- (!) 115 16 128/77 95 %   08/30/22 2137 -- (!) 113 14 110/72 91 %   08/30/22 2035 -- (!) 116 14 (!) 131/59 93 %   08/30/22 1952 -- (!) 116 16 (!) 102/45 95 %   08/30/22 1835 -- (!) 117 -- (!) 111/58 97 %   08/30/22 1750 -- (!) 124 22 (!) 101/59 98 %   08/30/22 1646 -- (!) 124 22 120/89 99 %   08/30/22 1606 98.5 °F (36.9 °C) -- -- -- --   08/30/22 1551 -- (!) 126 19 (!) 142/62 98 %       PHYSICAL EXAMINATION:    General: Obese, NAD, A&O  HEENT: Normocephalic, PERRL, no drainage  Neck: Supple, Trachea midline, difficult to assess due to body habitus  RESP: CTA bilaterally. + Symmetrical chest movement. No SOB or distress. On RA  Cardiovascular: RRR no MRG  PVS: No rubor, cyanosis, no edema,  ABD: soft, NT, Normoactive BS  Derm: Warm/Dry/Intact with no lesions,  Neuro: A&O PPTS,  No focal deficits  PSYCH: No anxiety or agitation    Recent labs results and imaging reviewed. Recent Results (from the past 24 hour(s))   CBC WITH AUTOMATED DIFF    Collection Time: 08/30/22  3:51 PM   Result Value Ref Range    WBC 16.1 (H) 3.6 - 11.0 K/uL    RBC 3.99 3.80 - 5.20 M/uL    HGB 12.2 11.5 - 16.0 g/dL    HCT 37.0 35.0 - 47.0 %    MCV 92.7 80.0 - 99.0 FL    MCH 30.6 26.0 - 34.0 PG    MCHC 33.0 30.0 - 36.5 g/dL    RDW 12.9 11.5 - 14.5 %    PLATELET 102 527 - 723 K/uL    MPV 10.9 8.9 - 12.9 FL    NRBC 0.0 0.0  WBC    ABSOLUTE NRBC 0.00 0.00 - 0.01 K/uL    NEUTROPHILS 87 (H) 32 - 75 %    LYMPHOCYTES 7 (L) 12 - 49 %    MONOCYTES 4 (L) 5 - 13 %    EOSINOPHILS 1 0 - 7 %    BASOPHILS 0 0 - 1 %    IMMATURE GRANULOCYTES 1 (H) 0 - 0.5 %    ABS. NEUTROPHILS 14.1 (H) 1.8 - 8.0 K/UL    ABS. LYMPHOCYTES 1.0 0.8 - 3.5 K/UL    ABS. MONOCYTES 0.7 0.0 - 1.0 K/UL    ABS. EOSINOPHILS 0.1 0.0 - 0.4 K/UL    ABS. BASOPHILS 0.0 0.0 - 0.1 K/UL    ABS. IMM.  GRANS. 0.1 (H) 0.00 - 0.04 K/UL    DF AUTOMATED     METABOLIC PANEL, COMPREHENSIVE    Collection Time: 08/30/22  3:51 PM   Result Value Ref Range    Sodium 137 136 - 145 mmol/L    Potassium 3.0 (L) 3.5 - 5.1 mmol/L    Chloride 102 97 - 108 mmol/L    CO2 28 21 - 32 mmol/L    Anion gap 7 5 - 15 mmol/L    Glucose 199 (H) 65 - 100 mg/dL    BUN 17 6 - 20 mg/dL    Creatinine 0.74 0.55 - 1.02 mg/dL    BUN/Creatinine ratio 23 (H) 12 - 20      GFR est AA >60 >60 ml/min/1.73m2    GFR est non-AA >60 >60 ml/min/1.73m2    Calcium 8.8 8.5 - 10.1 mg/dL    Bilirubin, total 0.3 0.2 - 1.0 mg/dL    AST (SGOT) 13 (L) 15 - 37 U/L    ALT (SGPT) 24 12 - 78 U/L    Alk. phosphatase 70 45 - 117 U/L    Protein, total 7.0 6.4 - 8.2 g/dL    Albumin 3.4 (L) 3.5 - 5.0 g/dL    Globulin 3.6 2.0 - 4.0 g/dL    A-G Ratio 0.9 (L) 1.1 - 2.2     TROPONIN-HIGH SENSITIVITY    Collection Time: 08/30/22  3:51 PM   Result Value Ref Range    Troponin-High Sensitivity 116 (H) 0 - 51 ng/L   HEMOGLOBIN A1C WITH EAG    Collection Time: 08/30/22 11:06 PM   Result Value Ref Range    Hemoglobin A1c 5.9 (H) 4.0 - 5.6 %    Est. average glucose 123 mg/dL   TROPONIN-HIGH SENSITIVITY    Collection Time: 08/30/22 11:06 PM   Result Value Ref Range    Troponin-High Sensitivity 229 (HH) 0 - 51 ng/L   METABOLIC PANEL, COMPREHENSIVE    Collection Time: 08/31/22  3:44 AM   Result Value Ref Range    Sodium 136 136 - 145 mmol/L    Potassium 3.5 3.5 - 5.1 mmol/L    Chloride 100 97 - 108 mmol/L    CO2 27 21 - 32 mmol/L    Anion gap 9 5 - 15 mmol/L    Glucose 170 (H) 65 - 100 mg/dL    BUN 16 6 - 20 mg/dL    Creatinine 0.74 0.55 - 1.02 mg/dL    BUN/Creatinine ratio 22 (H) 12 - 20      GFR est AA >60 >60 ml/min/1.73m2    GFR est non-AA >60 >60 ml/min/1.73m2    Calcium 8.7 8.5 - 10.1 mg/dL    Bilirubin, total 0.6 0.2 - 1.0 mg/dL    AST (SGOT) 12 (L) 15 - 37 U/L    ALT (SGPT) 25 12 - 78 U/L    Alk.  phosphatase 66 45 - 117 U/L    Protein, total 6.5 6.4 - 8.2 g/dL    Albumin 3.2 (L) 3.5 - 5.0 g/dL Globulin 3.3 2.0 - 4.0 g/dL    A-G Ratio 1.0 (L) 1.1 - 2.2     CBC WITH AUTOMATED DIFF    Collection Time: 08/31/22  3:44 AM   Result Value Ref Range    WBC 18.4 (H) 3.6 - 11.0 K/uL    RBC 3.86 3.80 - 5.20 M/uL    HGB 11.8 11.5 - 16.0 g/dL    HCT 35.8 35.0 - 47.0 %    MCV 92.7 80.0 - 99.0 FL    MCH 30.6 26.0 - 34.0 PG    MCHC 33.0 30.0 - 36.5 g/dL    RDW 13.3 11.5 - 14.5 %    PLATELET 925 606 - 995 K/uL    MPV 11.1 8.9 - 12.9 FL    NRBC 0.0 0.0  WBC    ABSOLUTE NRBC 0.00 0.00 - 0.01 K/uL    NEUTROPHILS 90 (H) 32 - 75 %    LYMPHOCYTES 5 (L) 12 - 49 %    MONOCYTES 4 (L) 5 - 13 %    EOSINOPHILS 0 0 - 7 %    BASOPHILS 0 0 - 1 %    IMMATURE GRANULOCYTES 1 (H) 0 - 0.5 %    ABS. NEUTROPHILS 16.7 (H) 1.8 - 8.0 K/UL    ABS. LYMPHOCYTES 0.9 0.8 - 3.5 K/UL    ABS. MONOCYTES 0.6 0.0 - 1.0 K/UL    ABS. EOSINOPHILS 0.0 0.0 - 0.4 K/UL    ABS. BASOPHILS 0.0 0.0 - 0.1 K/UL    ABS. IMM.  GRANS. 0.1 (H) 0.00 - 0.04 K/UL    DF AUTOMATED     GLUCOSE, POC    Collection Time: 08/31/22  8:05 AM   Result Value Ref Range    Glucose (POC) 151 (H) 65 - 100 mg/dL    Performed by Teodoro Less    ECHO ADULT COMPLETE    Collection Time: 08/31/22 12:12 PM   Result Value Ref Range    LV EDV A4C 28 mL    LV ESV A4C 8 mL    IVSd 0.9 0.6 - 0.9 cm    LVIDd 3.8 3.9 - 5.3 cm    LVIDs 2.4 cm    LVOT Diameter 2.2 cm    LVOT Mean Gradient 2 mmHg    LVOT VTI 13.0 cm    LVOT Peak Velocity 0.9 m/s    LVOT Peak Gradient 3 mmHg    LVPWd 1.0 0.6 - 0.9 cm    LV E' Lateral Velocity 8 cm/s    LV E' Septal Velocity 11 cm/s    LV Ejection Fraction A4C 71 %    LVOT Area 3.8 cm2    LVOT SV 49.4 ml    LA Major Axis 3.9 cm    LA Area 4C 7.3 cm2    LA Diameter 2.4 cm    AV Mean Gradient 4 mmHg    AV VTI 18.6 cm    AV Mean Velocity 1.0 m/s    AV Peak Velocity 1.3 m/s    AV Peak Gradient 7 mmHg    AV Area by VTI 2.7 cm2    AV Area by Peak Velocity 2.5 cm2    Aortic Root 2.4 cm    Ascending Aorta 2.5 cm    MR Peak Velocity 1.0 m/s    MR Peak Gradient 4 mmHg    MV Max Velocity 1.1 m/s    MV Peak Gradient 5 mmHg    MV A Velocity 0.69 m/s    MV E Velocity 0.95 m/s    MV Mean Gradient 2 mmHg    MV VTI 19.0 cm    MV Mean Velocity 0.6 m/s    MV Area by VTI 2.6 cm2    RV Basal Dimension 2.4 cm    RV Mid Dimension 2.1 cm    TAPSE 2.0 1.7 cm    TR Max Velocity 1.38 m/s    TR Peak Gradient 8 mmHg    Fractional Shortening 2D 37 28 - 44 %    LV ESV Index A4C 4 mL/m2    LV EDV Index A4C 13 mL/m2    LVIDd Index 1.80 cm/m2    LVIDs Index 1.14 cm/m2    LV RWT Ratio 0.53     LV Mass 2D 109.0 67 - 162 g    LV Mass 2D Index 51.7 43 - 95 g/m2    MV E/A 1.38     E/E' Ratio (Averaged) 10.26     E/E' Lateral 11.88     E/E' Septal 8.64     LVOT Stroke Volume Index 23.4 mL/m2    LA Size Index 1.14 cm/m2    LA/AO Root Ratio 1.00     Ao Root Index 1.14 cm/m2    Ascending Aorta Index 1.18 cm/m2    AV Velocity Ratio 0.69     LVOT:AV VTI Index 0.70     ANTHONY/BSA VTI 1.3 cm2/m2    ANTHONY/BSA Peak Velocity 1.2 cm2/m2    MV:LVOT VTI Index 1.46     RV Free Wall Peak S' 11 cm/s    Est. RA Pressure 3 mmHg    RVSP 11 mmHg   GLUCOSE, POC    Collection Time: 08/31/22 12:29 PM   Result Value Ref Range    Glucose (POC) 150 (H) 65 - 100 mg/dL    Performed by Barrett Agarwal        XR Results (maximum last 3): Results from [place] encounter on 08/30/22    XR CHEST PORT    Impression  No acute process. Results from Hospital Encounter encounter on 04/09/21    XR SPINE CERV W OBL/FLEX/EXT MIN 6 V COMP    Impression  Mild spondylosis at C4-5 and C5-6. CT Results (maximum last 3): Results from [place] encounter on 08/30/22    CTA CHEST W OR W WO CONT    Impression  1. No evidence of pulmonary embolus. 2.  No acute thoracic pathology. Clear lungs. 3. Cardiomegaly.         Current Facility-Administered Medications:     perflutren lipid microspheres (DEFINITY) contrast injection 2 mL, 2 mL, IntraVENous, ONCE, Ant Cason MD    atorvastatin (LIPITOR) tablet 40 mg, 40 mg, Oral, DAILY, Yoav, Ez Rodriguez MD, 40 mg at 08/31/22 9363    cetirizine (ZYRTEC) tablet 10 mg, 10 mg, Oral, DAILY, Dave Harmon MD, 10 mg at 08/31/22 5621    FLUoxetine (PROzac) capsule 40 mg, 40 mg, Oral, DAILY, Ant Cason MD, 40 mg at 08/31/22 0089    hydroCHLOROthiazide (HYDRODIURIL) tablet 25 mg, 25 mg, Oral, DAILY, Dave Harmon MD, 25 mg at 08/31/22 0909    therapeutic multivitamin [facility]) tablet 1 Tablet, 1 Tablet, Oral, DAILY, Ant Cason MD, 1 Tablet at 08/31/22 9054    pantoprazole (PROTONIX) tablet 40 mg, 40 mg, Oral, ACB, Ant Cason MD, 40 mg at 08/31/22 2575    pregabalin (LYRICA) capsule 300 mg, 300 mg, Oral, BID, Ez Cason MD, 300 mg at 08/31/22 0908    zolpidem (AMBIEN) tablet 5 mg, 5 mg, Oral, QHS PRN, Dave Harmon MD    insulin lispro (HUMALOG) injection, , SubCUTAneous, AC&HS, Dave Harmon MD    glucose chewable tablet 16 g, 4 Tablet, Oral, PRN, Ez Cason MD    glucagon (GLUCAGEN) injection 1 mg, 1 mg, IntraMUSCular, PRN, Dave Harmon MD    acetaminophen (TYLENOL) tablet 650 mg, 650 mg, Oral, Q6H PRN, 650 mg at 08/31/22 0908 **OR** acetaminophen (TYLENOL) suppository 650 mg, 650 mg, Rectal, Q6H PRN, Ant Cason MD    polyethylene glycol (MIRALAX) packet 17 g, 17 g, Oral, DAILY PRN, Ant Cason MD    ondansetron (ZOFRAN ODT) tablet 4 mg, 4 mg, Oral, Q8H PRN **OR** ondansetron (ZOFRAN) injection 4 mg, 4 mg, IntraVENous, Q6H PRN, Ant Cason MD    enoxaparin (LOVENOX) injection 40 mg, 40 mg, SubCUTAneous, DAILY, Ant Cason MD, 40 mg at 08/31/22 0633    aspirin tablet 325 mg, 325 mg, Oral, DAILY, Dave Harmon MD, 325 mg at 08/31/22 0908    Case discussed with collaborating physician Dr. Kimber Giron  and our impression and recommendations are as follows:     Chest pain:   - c/o sharp non-radiating chest pain, associated with nausea  - HS Troponins are 116 > 229, repeat pending .   - Serial EKGs are nonischemic.   - Echocardiogram pending to assess overall cardiac structure and function. - Will order NMST in the am. Further recommendations pending test results tomorrow. Patient to be NPO at midnight.   - Continue telemetry monitoring.   -Continue asa.     2. Hypertension:   - blood pressure at goal  - continue home medications: HCTZ 25 mg daily    3. Hyperlipidemia:   - continue statin therapy: atorvastatin 40 mg daily  - LFTs within normal limits    Thank you for involving us in the care of this patient. Please do not hesitate to call if additional questions arise. If after hours please call [phone]. Dr. Barry Letters Card[facility]  [address] [facility].    [address] [place], [place] [address]dulce Heath, [address] [facility]  [phone]

## 2022-08-31 NOTE — PROGRESS NOTES
Problem: Falls - Risk of  Goal: *Absence of Falls  Description: Document Francoise Blood Fall Risk and appropriate interventions in the flowsheet.   Outcome: Progressing Towards Goal  Note: Fall Risk Interventions:            Medication Interventions: Teach patient to arise slowly                   Problem: Patient Education: Go to Patient Education Activity  Goal: Patient/Family Education  Outcome: Progressing Towards Goal

## 2022-08-31 NOTE — PROGRESS NOTES
Problem: Falls - Risk of  Goal: *Absence of Falls  Description: Document Francoise Blood Fall Risk and appropriate interventions in the flowsheet.   Outcome: Progressing Towards Goal  Note: Fall Risk Interventions:            Medication Interventions: Teach patient to arise slowly                   Problem: Patient Education: Go to Patient Education Activity  Goal: Patient/Family Education  Outcome: Progressing Towards Goal     Problem: Unstable angina/NSTEMI: Day of Admission/Day 1  Goal: Consults, if ordered  Outcome: Progressing Towards Goal  Goal: Treatments/Interventions/Procedures  Outcome: Progressing Towards Goal

## 2022-08-31 NOTE — ED NOTES
TRANSFER - OUT REPORT:    Tubed report given to Allison Keane on Prasad Alejandra  being transferred to [facility] for routine progression of care       Report consisted of patients Situation, Background, Assessment and   Recommendations(SBAR). Information from the following report(s) SBAR and ED Summary was reviewed with the receiving nurse. Lines:   Peripheral IV 08/30/22 Anterior;Proximal;Right Forearm (Active)        Opportunity for questions and clarification was provided.       Patient transported with:   [employer]

## 2022-09-01 ENCOUNTER — APPOINTMENT (OUTPATIENT)
Dept: NUCLEAR MEDICINE | Age: 57
End: 2022-09-01
Attending: NURSE PRACTITIONER
Payer: MEDICARE

## 2022-09-01 ENCOUNTER — APPOINTMENT (OUTPATIENT)
Dept: NON INVASIVE DIAGNOSTICS | Age: 57
End: 2022-09-01
Attending: NURSE PRACTITIONER
Payer: MEDICARE

## 2022-09-01 LAB
ALBUMIN SERPL-MCNC: 2.7 G/DL (ref 3.5–5)
ALBUMIN/GLOB SERPL: 0.8 {RATIO} (ref 1.1–2.2)
ALP SERPL-CCNC: 79 U/L (ref 45–117)
ALT SERPL-CCNC: 17 U/L (ref 12–78)
ANION GAP SERPL CALC-SCNC: 6 MMOL/L (ref 5–15)
AST SERPL W P-5'-P-CCNC: 12 U/L (ref 15–37)
ATRIAL RATE: 123 BPM
BACTERIA SPEC CULT: NORMAL
BILIRUB SERPL-MCNC: 0.6 MG/DL (ref 0.2–1)
BUN SERPL-MCNC: 17 MG/DL (ref 6–20)
BUN/CREAT SERPL: 22 (ref 12–20)
CA-I BLD-MCNC: 8.7 MG/DL (ref 8.5–10.1)
CALCULATED P AXIS, ECG09: 67 DEGREES
CALCULATED R AXIS, ECG10: 67 DEGREES
CALCULATED T AXIS, ECG11: 55 DEGREES
CHLORIDE SERPL-SCNC: 97 MMOL/L (ref 97–108)
CO2 SERPL-SCNC: 30 MMOL/L (ref 21–32)
COLONY COUNT,CNT: NORMAL
COLONY COUNT,CNT: NORMAL
COVID-19 RAPID TEST, COVR: NOT DETECTED
CREAT SERPL-MCNC: 0.78 MG/DL (ref 0.55–1.02)
DIAGNOSIS, 93000: NORMAL
ERYTHROCYTE [DISTWIDTH] IN BLOOD BY AUTOMATED COUNT: 13.2 % (ref 11.5–14.5)
GLOBULIN SER CALC-MCNC: 3.6 G/DL (ref 2–4)
GLUCOSE BLD STRIP.AUTO-MCNC: 107 MG/DL (ref 65–100)
GLUCOSE BLD STRIP.AUTO-MCNC: 112 MG/DL (ref 65–100)
GLUCOSE BLD STRIP.AUTO-MCNC: 141 MG/DL (ref 65–100)
GLUCOSE SERPL-MCNC: 148 MG/DL (ref 65–100)
HCT VFR BLD AUTO: 33.7 % (ref 35–47)
HGB BLD-MCNC: 11.1 G/DL (ref 11.5–16)
MCH RBC QN AUTO: 30.2 PG (ref 26–34)
MCHC RBC AUTO-ENTMCNC: 32.9 G/DL (ref 30–36.5)
MCV RBC AUTO: 91.8 FL (ref 80–99)
NRBC # BLD: 0 K/UL (ref 0–0.01)
NRBC BLD-RTO: 0 PER 100 WBC
NUC STRESS EJECTION FRACTION: 70 %
P-R INTERVAL, ECG05: 152 MS
PERFORMED BY, TECHID: ABNORMAL
PLATELET # BLD AUTO: 207 K/UL (ref 150–400)
PMV BLD AUTO: 10.8 FL (ref 8.9–12.9)
POTASSIUM SERPL-SCNC: 3.3 MMOL/L (ref 3.5–5.1)
PROT SERPL-MCNC: 6.3 G/DL (ref 6.4–8.2)
Q-T INTERVAL, ECG07: 306 MS
QRS DURATION, ECG06: 68 MS
QTC CALCULATION (BEZET), ECG08: 438 MS
RBC # BLD AUTO: 3.67 M/UL (ref 3.8–5.2)
SODIUM SERPL-SCNC: 133 MMOL/L (ref 136–145)
SPECIAL REQUESTS,SREQ: NORMAL
STRESS BASELINE DIAS BP: 74 MMHG
STRESS BASELINE HR: 115 BPM
STRESS BASELINE ST DEPRESSION: 0 MM
STRESS BASELINE SYS BP: 135 MMHG
STRESS PERCENT HR ACHIEVED: 85 %
STRESS POST PEAK HR: 139 BPM
STRESS STAGE 1 BP: NORMAL MMHG
STRESS STAGE 1 DURATION: NORMAL MIN:SEC
STRESS STAGE 1 HR: 123 BPM
STRESS STAGE 2 DURATION: NORMAL MIN:SEC
STRESS STAGE 2 HR: 125 BPM
STRESS STAGE 3 BP: NORMAL MMHG
STRESS STAGE 3 DURATION: NORMAL MIN:SEC
STRESS STAGE 3 HR: 122 BPM
STRESS STAGE 4 BP: NORMAL MMHG
STRESS STAGE 4 DURATION: NORMAL MIN:SEC
STRESS STAGE 4 HR: 122 BPM
STRESS TARGET HR: 164 BPM
VENTRICULAR RATE, ECG03: 123 BPM
WBC # BLD AUTO: 15.7 K/UL (ref 3.6–11)

## 2022-09-01 PROCEDURE — 87040 BLOOD CULTURE FOR BACTERIA: CPT

## 2022-09-01 PROCEDURE — 74011250637 HC RX REV CODE- 250/637: Performed by: FAMILY MEDICINE

## 2022-09-01 PROCEDURE — 36415 COLL VENOUS BLD VENIPUNCTURE: CPT

## 2022-09-01 PROCEDURE — 82962 GLUCOSE BLOOD TEST: CPT

## 2022-09-01 PROCEDURE — 87635 SARS-COV-2 COVID-19 AMP PRB: CPT

## 2022-09-01 PROCEDURE — 85027 COMPLETE CBC AUTOMATED: CPT

## 2022-09-01 PROCEDURE — 80053 COMPREHEN METABOLIC PANEL: CPT

## 2022-09-01 PROCEDURE — 96372 THER/PROPH/DIAG INJ SC/IM: CPT

## 2022-09-01 PROCEDURE — 65270000029 HC RM PRIVATE

## 2022-09-01 PROCEDURE — A9500 TC99M SESTAMIBI: HCPCS

## 2022-09-01 PROCEDURE — G0378 HOSPITAL OBSERVATION PER HR: HCPCS

## 2022-09-01 PROCEDURE — 74011250636 HC RX REV CODE- 250/636: Performed by: FAMILY MEDICINE

## 2022-09-01 PROCEDURE — 74011250636 HC RX REV CODE- 250/636

## 2022-09-01 RX ORDER — POTASSIUM CHLORIDE 750 MG/1
40 TABLET, FILM COATED, EXTENDED RELEASE ORAL
Status: COMPLETED | OUTPATIENT
Start: 2022-09-01 | End: 2022-09-01

## 2022-09-01 RX ORDER — TRAMADOL HYDROCHLORIDE 50 MG/1
50 TABLET ORAL
Status: DISCONTINUED | OUTPATIENT
Start: 2022-09-01 | End: 2022-09-02 | Stop reason: HOSPADM

## 2022-09-01 RX ORDER — TRAMADOL HYDROCHLORIDE 50 MG/1
50 TABLET ORAL
Status: COMPLETED | OUTPATIENT
Start: 2022-09-01 | End: 2022-09-01

## 2022-09-01 RX ADMIN — ENOXAPARIN SODIUM 30 MG: 100 INJECTION SUBCUTANEOUS at 21:21

## 2022-09-01 RX ADMIN — ATORVASTATIN CALCIUM 40 MG: 40 TABLET, FILM COATED ORAL at 11:20

## 2022-09-01 RX ADMIN — REGADENOSON 0.4 MG: 0.08 INJECTION, SOLUTION INTRAVENOUS at 09:39

## 2022-09-01 RX ADMIN — PREGABALIN 300 MG: 150 CAPSULE ORAL at 21:20

## 2022-09-01 RX ADMIN — CETIRIZINE HYDROCHLORIDE 10 MG: 10 TABLET, FILM COATED ORAL at 11:20

## 2022-09-01 RX ADMIN — ACETAMINOPHEN 650 MG: 325 TABLET, FILM COATED ORAL at 11:20

## 2022-09-01 RX ADMIN — PREGABALIN 300 MG: 150 CAPSULE ORAL at 11:20

## 2022-09-01 RX ADMIN — THERA TABS 1 TABLET: TAB at 11:20

## 2022-09-01 RX ADMIN — TRAMADOL HYDROCHLORIDE 50 MG: 50 TABLET, COATED ORAL at 14:06

## 2022-09-01 RX ADMIN — ASPIRIN 325 MG ORAL TABLET 325 MG: 325 PILL ORAL at 11:20

## 2022-09-01 RX ADMIN — FLUOXETINE 40 MG: 20 CAPSULE ORAL at 11:19

## 2022-09-01 RX ADMIN — HYDROCHLOROTHIAZIDE 25 MG: 25 TABLET ORAL at 11:20

## 2022-09-01 RX ADMIN — POTASSIUM CHLORIDE 40 MEQ: 750 TABLET, FILM COATED, EXTENDED RELEASE ORAL at 14:05

## 2022-09-01 NOTE — PROGRESS NOTES
Problem: Falls - Risk of  Goal: *Absence of Falls  Description: Document Corinne Rodriguez Fall Risk and appropriate interventions in the flowsheet.   Outcome: Progressing Towards Goal  Note: Fall Risk Interventions:            Medication Interventions: Teach patient to arise slowly                   Problem: Patient Education: Go to Patient Education Activity  Goal: Patient/Family Education  Outcome: Progressing Towards Goal     Problem: Unstable angina/NSTEMI: Day of Admission/Day 1  Goal: Consults, if ordered  Outcome: Progressing Towards Goal  Goal: Treatments/Interventions/Procedures  Outcome: Progressing Towards Goal

## 2022-09-01 NOTE — PROGRESS NOTES
Problem: Falls - Risk of  Goal: *Absence of Falls  Description: Document Bard Guthrieumber Fall Risk and appropriate interventions in the flowsheet.   Outcome: Progressing Towards Goal  Note: Fall Risk Interventions:            Medication Interventions: Teach patient to arise slowly                   Problem: Patient Education: Go to Patient Education Activity  Goal: Patient/Family Education  Outcome: Progressing Towards Goal     Problem: Unstable angina/NSTEMI: Day of Admission/Day 1  Goal: Consults, if ordered  Outcome: Progressing Towards Goal  Goal: Treatments/Interventions/Procedures  Outcome: Progressing Towards Goal

## 2022-09-01 NOTE — PROGRESS NOTES
CM reviewed chart. Patient's plan is to return home/self when medically ready for discharge. CM will continue to follow.

## 2022-09-01 NOTE — PROGRESS NOTES
PROGRESS NOTE    Patient: George Irving MRN: [id number]  SSN: [id number]    YOB: 1965  Age: 65 y.o. Sex: female      Admit Date: 8/30/2022    LOS: 2 days       Subjective      CHIEF COMPLAINT: chest pain            HISTORY OF PRESENT ILLNESS:        Patient is a 65y.o. year old female with past medical history GERD hypercholesterolemia hypertension diabetes complains of 1 day history of non radiating substernal chest pain. Patient reports the pain is sharp, without noted aggravating relieving factors. Patient states the pain is moderate in intensity. Patient reports nausea without vomiting.     8/30- labs  WBC 16.1  Neutrophils 87  Absolute neutrophils 14.1  Potassium 3.0  Glucose 199  Trop 116 15:51 and 229 23:06  A1c 5.9     8/30- Imaging   XR CHEST Port- Impression: no acute process  CTA CHEST w or wo cont- Impression: 1. No evidence of pulmonary embolus. 2.  No acute thoracic pathology. Clear lungs. 3. Cardiomegaly. ECHO- impression: pending      8/31- general   Patient was out of her room at this time. 8/31- UA  Trace protein  Ketone 15  Blood small  Mucus 2+   WBC and RBC 5-10  Urine and blood culture- pending   TSH 0.17  Troponin 79     8/31- Labs  WBC 18.4  Neutrophils 90  Absolute neutrophil 16.7  Potassium 3.5  Neutrophils 90  Absolute neutrophils 16.7   Glucose 170    9/1- General  Patient was out of the room at this time for stress testing. 9/1- stress test  Results pending        ROS  Constitutional: Negative for chills and fever. HENT: Negative. Eyes: Negative. Respiratory: Negative. Cardiovascular: Negative. Gastrointestinal: Negative for abdominal pain and nausea. Skin: Negative. Neurological: Negative.       Objective     Visit Vitals  /74   Pulse 79   Temp 98.6 °F (37 °C)   Resp 18   Ht 5' 1\" (1.549 m)   Wt 117 kg (258 lb)   SpO2 95%   BMI 48.75 kg/m²      O2 Device: None (Room air)    Physical Exam:   Constitutional: pt is oriented to person, place, and time. HENT:   Head: Normocephalic and atraumatic. Eyes: Pupils are equal, round, and reactive to light. EOM are normal.   Cardiovascular: Normal rate, regular rhythm and normal heart sounds. Pulmonary/Chest: Breath sounds normal. No wheezes. No rales. Exhibits no tenderness. Abdominal: Soft. Bowel sounds are normal. There is no abdominal tenderness. There is no rebound and no guarding. Musculoskeletal: Normal range of motion. Neurological: pt is alert and oriented to person, place, and time. Alert. Normal strength. No cranial nerve deficit or sensory deficit. Displays a negative Romberg sign. Intake & Output:  Current Shift: No intake/output data recorded. Last three shifts: 08/30 1901 - 09/01 0700  In: 500 [P.O.:500]  Out: 250 [Urine:250]    Lab/Data Review: All lab results for the last 24 hours reviewed.        24 Hour Results:    Recent Results (from the past 24 hour(s))   EKG, 12 LEAD, SUBSEQUENT    Collection Time: 08/31/22 11:47 AM   Result Value Ref Range    Ventricular Rate 95 BPM    Atrial Rate 95 BPM    P-R Interval 164 ms    QRS Duration 78 ms    Q-T Interval 334 ms    QTC Calculation (Bezet) 419 ms    Calculated P Axis 74 degrees    Calculated R Axis 58 degrees    Calculated T Axis 63 degrees    Diagnosis       Normal sinus rhythm  Normal ECG  When compared with ECG of 30-AUG-2022 15:42, (Unconfirmed)  No significant change was found  Confirmed by [facility] ([id number]) on 8/31/2022 8:29:14 PM     ECHO ADULT COMPLETE    Collection Time: 08/31/22 12:12 PM   Result Value Ref Range    LV EDV A4C 28 mL    LV ESV A4C 8 mL    IVSd 0.9 0.6 - 0.9 cm    LVIDd 3.8 3.9 - 5.3 cm    LVIDs 2.4 cm    LVOT Diameter 2.2 cm    LVOT Mean Gradient 2 mmHg    LVOT VTI 13.0 cm    LVOT Peak Velocity 0.9 m/s    LVOT Peak Gradient 3 mmHg    LVPWd 1.0 0.6 - 0.9 cm    LV E' Lateral Velocity 8 cm/s    LV E' Septal Velocity 11 cm/s    LV Ejection Fraction A4C 71 %    LVOT Area 3.8 cm2    LVOT SV 49.4 ml LA Major Lamar 3.9 cm    LA Area 4C 7.3 cm2    LA Diameter 2.4 cm    AV Mean Gradient 4 mmHg    AV VTI 18.6 cm    AV Mean Velocity 1.0 m/s    AV Peak Velocity 1.3 m/s    AV Peak Gradient 7 mmHg    AV Area by VTI 2.7 cm2    AV Area by Peak Velocity 2.5 cm2    Aortic Root 2.4 cm    Ascending Aorta 2.5 cm    MR Peak Velocity 1.0 m/s    MR Peak Gradient 4 mmHg    MV Max Velocity 1.1 m/s    MV Peak Gradient 5 mmHg    MV A Velocity 0.69 m/s    MV E Velocity 0.95 m/s    MV Mean Gradient 2 mmHg    MV VTI 19.0 cm    MV Mean Velocity 0.6 m/s    MV Area by VTI 2.6 cm2    RV Basal Dimension 2.4 cm    RV Mid Dimension 2.1 cm    TAPSE 2.0 1.7 cm    TR Max Velocity 1.38 m/s    TR Peak Gradient 8 mmHg    Fractional Shortening 2D 37 28 - 44 %    LV ESV Index A4C 4 mL/m2    LV EDV Index A4C 13 mL/m2    LVIDd Index 1.80 cm/m2    LVIDs Index 1.14 cm/m2    LV RWT Ratio 0.53     LV Mass 2D 109.0 67 - 162 g    LV Mass 2D Index 51.7 43 - 95 g/m2    MV E/A 1.38     E/E' Ratio (Averaged) 10.26     E/E' Lateral 11.88     E/E' Septal 8.64     LVOT Stroke Volume Index 23.4 mL/m2    LA Size Index 1.14 cm/m2    LA/AO Root Ratio 1.00     Ao Root Index 1.14 cm/m2    Ascending Aorta Index 1.18 cm/m2    AV Velocity Ratio 0.69     LVOT:AV VTI Index 0.70     ANTHONY/BSA VTI 1.3 cm2/m2    ANTHONY/BSA Peak Velocity 1.2 cm2/m2    MV:LVOT VTI Index 1.46     RV Free Wall Peak S' 11 cm/s    Est. RA Pressure 3 mmHg    RVSP 11 mmHg   GLUCOSE, POC    Collection Time: 08/31/22 12:29 PM   Result Value Ref Range    Glucose (POC) 150 (H) 65 - 100 mg/dL    Performed by Joey Stokes W/MICROSCOPIC    Collection Time: 08/31/22 12:45 PM   Result Value Ref Range    Color Dark Yellow      Appearance Hazy (A) Clear      Specific gravity 1.020 1.003 - 1.030      pH (UA) 5.0      Protein Trace (A) Negative mg/dL    Glucose Negative Negative mg/dL    Ketone 15 (A) Negative mg/dL    Bilirubin Negative Negative      Blood Small (A) Negative      Urobilinogen 1.0 0.2 - 1.0 EU/dL    Nitrites Negative Negative      Leukocyte Esterase Negative Negative      WBC 5-10 0 - 4 /hpf    RBC 5-10 0 - 5 /hpf    Bacteria Negative Negative /hpf    Mucus 2+ /lpf   TROPONIN-HIGH SENSITIVITY    Collection Time: 08/31/22  1:36 PM   Result Value Ref Range    Troponin-High Sensitivity 79 (H) 0 - 51 ng/L   TSH 3RD GENERATION    Collection Time: 08/31/22  1:36 PM   Result Value Ref Range    TSH 0.17 (L) 0.36 - 3.74 uIU/mL   GLUCOSE, POC    Collection Time: 08/31/22  4:07 PM   Result Value Ref Range    Glucose (POC) 146 (H) 65 - 100 mg/dL    Performed by Angelic Farrell (Float)    GLUCOSE, POC    Collection Time: 08/31/22  8:36 PM   Result Value Ref Range    Glucose (POC) 210 (H) 65 - 100 mg/dL    Performed by Miryam Kay    NUCLEAR CARDIAC STRESS TEST    Collection Time: 09/01/22  9:52 AM   Result Value Ref Range    Stress Target  bpm    Baseline  bpm    Baseline Systolic  mmHg    Baseline Diastolic BP 74 mmHg    Stress Peak  bpm    Stress Percent HR Achieved 85 %    Stress Stage 1 Duration 1 min min:sec    Stress Stage 1  bpm    Stress Stage 1 /50 mmHg    Stress Stage 2 Duration 2 min min:sec    Stress Stage 2  bpm    Stress Stage 3 Duration 3 min min:sec    Stress Stage 3  bpm    Stress Stage 3 /80 mmHg    Stress Stage 4 Duration 4 min min:sec    Stress Stage 4  bpm    Stress Stage 4 /82 mmHg         Imaging:    CTA CHEST W OR W WO CONT   Final Result   1. No evidence of pulmonary embolus. 2.  No acute thoracic pathology. Clear lungs. 3. Cardiomegaly. XR CHEST PORT   Final Result   No acute process.                 Assessment   MI r/o   hypercholesterolemia   hypertension   Diabetes  Leukocytosis  Hyperthyroidism     Plan   Continue to follow cardiology recommendations- Nuclear medicine stress test results pending   Echo- pending  Continue to monitor HH, electrolytes, and kidney function   Continue current medications Medications:  Aspirin tablet 325mg  Atorvastatin tablet 40mg  Zyrtec tablet 10mg  Lovenox injection 40mg  Prozac capsule 40mg  Hydrochlorothiazide tablet 25mg  Humalog injection   Protonix tablet 40mg   Vale capsule 300mg   Theragran tablet   PRN ambien tablet 5mg      Current Facility-Administered Medications:     enoxaparin (LOVENOX) injection 30 mg, 30 mg, SubCUTAneous, Q12H, Ant Cason MD, 30 mg at 08/31/22 2148    atorvastatin (LIPITOR) tablet 40 mg, 40 mg, Oral, DAILY, Ant Cason MD, 40 mg at 08/31/22 0908    cetirizine (ZYRTEC) tablet 10 mg, 10 mg, Oral, DAILY, Ant Cason MD, 10 mg at 08/31/22 8243    FLUoxetine (PROzac) capsule 40 mg, 40 mg, Oral, DAILY, Ant Cason MD, 40 mg at 08/31/22 2880    hydroCHLOROthiazide (HYDRODIURIL) tablet 25 mg, 25 mg, Oral, DAILY, Ant Cason MD, 25 mg at 08/31/22 0909    therapeutic multivitamin [facility]) tablet 1 Tablet, 1 Tablet, Oral, DAILY, Ant Cason MD, 1 Tablet at 08/31/22 0909    pantoprazole (PROTONIX) tablet 40 mg, 40 mg, Oral, ACB, Ant Cason MD, 40 mg at 08/31/22 0914    pregabalin (LYRICA) capsule 300 mg, 300 mg, Oral, BID, Ez Cason MD, 300 mg at 08/31/22 2146    zolpidem (AMBIEN) tablet 5 mg, 5 mg, Oral, QHS PRN, Dave Harmon MD    insulin lispro (HUMALOG) injection, , SubCUTAneous, AC&HS, Ant Cason MD, 2 Units at 08/31/22 2147    glucose chewable tablet 16 g, 4 Tablet, Oral, PRN, Ez Cason MD    glucagon (GLUCAGEN) injection 1 mg, 1 mg, IntraMUSCular, PRN, Ez Cason MD    acetaminophen (TYLENOL) tablet 650 mg, 650 mg, Oral, Q6H PRN, 650 mg at 08/31/22 0908 **OR** acetaminophen (TYLENOL) suppository 650 mg, 650 mg, Rectal, Q6H PRN, Ant Cason MD    polyethylene glycol (MIRALAX) packet 17 g, 17 g, Oral, DAILY PRN, Ant Cason MD    ondansetron (ZOFRAN ODT) tablet 4 mg, 4 mg, Oral, Q8H PRN **OR** ondansetron (ZOFRAN) injection 4 mg, 4 mg, IntraVENous, Q6H PRN, Lavonne Frankel, MD    aspirin tablet 325 mg, 325 mg, Oral, DAILY, Ant Cason MD, 325 mg at 08/31/22 0908    Current Outpatient Medications   Medication Instructions    adalimumab (HUMIRA) 40 mg, SubCUTAneous, EVERY 2 WEEKS    atorvastatin (LIPITOR) 40 mg, DAILY    cetirizine (ZYRTEC) 10 mg, DAILY    FLUoxetine (PROZAC) 40 mg, DAILY    hydroCHLOROthiazide (HYDRODIURIL) 25 mg, Oral, DAILY    multivitamin (ONE A DAY) tablet 1 Tablet, Oral, DAILY    omeprazole (PRILOSEC) 40 mg, DAILY    pregabalin (LYRICA) 300 mg, 2 TIMES DAILY    SUMAtriptan-naproxen (TREXIMET)  mg tablet Take one tablet at headache onset and repeat in 2 hours x1 prn    zolpidem (AMBIEN) 10 mg tablet BEDTIME PRN         Signed By: Nannette Van     September 1, 2022

## 2022-09-01 NOTE — PROGRESS NOTES
Patient complaining of a headache and tylenol is not helping. Dr. Louise Frazier notified and orders received for tramadol 50mg once.

## 2022-09-01 NOTE — PROGRESS NOTES
Cardiology Progress Note    CHIEF COMPLAINT:    Chief Complaint   Patient presents with    Chest Pain    Nausea     HISTORY OF PRESENT ILLNESS:  Yamilex Hawthorne is a 65y.o. year-old female with past medical history significant for hypertension, hyperlipidemia, obstructive sleep apnea, fibromyalgia, depression, and Crohn's disease who was admitted to the hospital for further evaluation of chest pain for the past 24 hours. On examination, patient is sitting upright in the bed, does not appear to be in any acute distress. Patient states she developed sharp, non-radiating chest pain while she was eating. Associated factors include nausea. There were no aggravating or relieving factors prompting the patient to seek medical treatment. 9/1: Patient was seen and examined in the cardiovascular lab prior to a nuclear medicine stress test. Patient denies active chest pain or discomfort. She is febrile this morning with a temp of 101.1 orally. Records from hospital admission course thus far reviewed. Telemetry Review: No acute events noted since admission. Remains in NSR.        PAST MEDICAL HISTORY:    Past Medical History:   Diagnosis Date    Anemia, unspecified 8/20/2011    Autoimmune disease ([facility]     fibromyalgia    Coagulation defects     anemia    Crohn's disease ([facility] [address]     Depression     Fibromyalgia     GERD (gastroesophageal reflux disease)     Hypercholesterolemia     Hypertension     Musculoskeletal disorder     Other ill-defined conditions(799.89)     crohns    Psychiatric disorder     depression    Unspecified sleep apnea     cpap       PAST SURGICAL HISTORY:   Past Surgical History:   Procedure Laterality Date    COLONOSCOPY N/A 8/24/2020    COLONOSCOPY performed by Marcio Looney MD at [facility] ENDOSCOPY    HX CHOLECYSTECTOMY  [id number]    HX OOPHORECTOMY  2003    right       ALLERGIES:    Allergies   Allergen Reactions    Pcn [Penicillins] Anaphylaxis       FAMILY HISTORY:    Family History   Problem Relation Age of Onset    Hypertension Mother     Dementia Mother     Migraines Mother     Hypertension Father     Heart Disease Father        SOCIAL HISTORY:    Tobacco: Former smoker  Drugs: Not documented  ETOH: Not documented    HOME MEDICATIONS:    Prior to Admission Medications   Prescriptions Last Dose Informant Patient Reported? Taking? FLUoxetine (PROzac) 40 mg capsule Not Taking  Yes No   Sig: Take 40 mg by mouth daily. Patient not taking: Reported on 2022   SUMAtriptan-naproxen (TREXIMET)  mg tablet Not Taking  No No   Sig: Take one tablet at headache onset and repeat in 2 hours x1 prn   Patient not taking: Reported on 2022   adalimumab (HUMIRA) 40 mg/0.8 mL injection pen 2022  Yes Yes   Si mg by SubCUTAneous route Once every 2 weeks. atorvastatin (LIPITOR) 20 mg tablet 2022  Yes Yes   Sig: Take 40 mg by mouth daily. cetirizine (ZYRTEC) 10 mg tablet 2022  Yes Yes   Sig: Take 10 mg by mouth daily. hydroCHLOROthiazide (HYDRODIURIL) 25 mg tablet 2022  Yes Yes   Sig: Take 25 mg by mouth daily. multivitamin (ONE A DAY) tablet 2022  Yes Yes   Sig: Take 1 Tab by mouth daily. omeprazole (PRILOSEC) 40 mg capsule 2022  Yes Yes   Sig: Take 40 mg by mouth daily. pregabalin (LYRICA) 300 mg capsule Unknown  Yes No   Sig: Take 300 mg by mouth two (2) times a day. Patient not taking: Reported on 2022   zolpidem (AMBIEN) 10 mg tablet Not Taking  Yes No   Sig: Take  by mouth nightly as needed for Sleep. Patient not taking: Reported on 2022      Facility-Administered Medications: None       REVIEW OF SYSTEMS:  Complete review of systems performed, pertinents noted above, all other systems are negative.     Patient Vitals for the past 24 hrs:   Temp Pulse Resp BP SpO2   22 1119 99 °F (37.2 °C) (!) 105 18 112/61 95 %   22 0942 -- -- -- 135/74 --   22 0758 98.6 °F (37 °C) 79 18 110/61 --   22 0301 98.6 °F (37 °C) 79 18 110/61 95 %   09/01/22 0000 -- 81 -- -- --   08/31/22 2330 99.9 °F (37.7 °C) 81 16 (!) 112/59 98 %   08/31/22 2030 99.9 °F (37.7 °C) (!) 106 16 117/62 97 %   08/31/22 2000 -- (!) 106 -- -- --   08/31/22 1502 99.1 °F (37.3 °C) (!) 106 17 110/67 97 %         PHYSICAL EXAMINATION:    General: Obese, NAD, A&O  HEENT: Normocephalic, PERRL, no drainage  Neck: Supple, Trachea midline, difficult to assess due to body habitus  RESP: CTA bilaterally. + Symmetrical chest movement. No SOB or distress. On RA  Cardiovascular: RRR no MRG  PVS: No rubor, cyanosis, no edema,  ABD: soft, NT, Normoactive BS  Derm: Warm/Dry/Intact with no lesions,  Neuro: A&O PPTS,  No focal deficits  PSYCH: No anxiety or agitation    Recent labs results and imaging reviewed.       Recent Results (from the past 24 hour(s))   GLUCOSE, POC    Collection Time: 08/31/22  4:07 PM   Result Value Ref Range    Glucose (POC) 146 (H) 65 - 100 mg/dL    Performed by Ruben Ramirez (Float)    GLUCOSE, POC    Collection Time: 08/31/22  8:36 PM   Result Value Ref Range    Glucose (POC) 210 (H) 65 - 100 mg/dL    Performed by Amberly 31 STRESS TEST    Collection Time: 09/01/22 11:00 AM   Result Value Ref Range    Stress Target  bpm    Baseline  bpm    Baseline Systolic  mmHg    Baseline Diastolic BP 74 mmHg    Stress Peak  bpm    Stress Percent HR Achieved 85 %    Stress Stage 1 Duration 1 min min:sec    Stress Stage 1  bpm    Stress Stage 1 /50 mmHg    Stress Stage 2 Duration 2 min min:sec    Stress Stage 2  bpm    Stress Stage 3 Duration 3 min min:sec    Stress Stage 3  bpm    Stress Stage 3 /80 mmHg    Stress Stage 4 Duration 4 min min:sec    Stress Stage 4  bpm    Stress Stage 4 /82 mmHg    Baseline ST Depression 0 mm    Nuc Stress EF 70 %   COVID-19 RAPID TEST    Collection Time: 09/01/22 11:08 AM   Result Value Ref Range    COVID-19 rapid test Not Detected Not Detected     CBC W/O DIFF    Collection Time: 09/01/22 11:13 AM   Result Value Ref Range    WBC 15.7 (H) 3.6 - 11.0 K/uL    RBC 3.67 (L) 3.80 - 5.20 M/uL    HGB 11.1 (L) 11.5 - 16.0 g/dL    HCT 33.7 (L) 35.0 - 47.0 %    MCV 91.8 80.0 - 99.0 FL    MCH 30.2 26.0 - 34.0 PG    MCHC 32.9 30.0 - 36.5 g/dL    RDW 13.2 11.5 - 14.5 %    PLATELET 735 061 - 653 K/uL    MPV 10.8 8.9 - 12.9 FL    NRBC 0.0 0.0  WBC    ABSOLUTE NRBC 0.00 0.00 - 2.03 K/uL   METABOLIC PANEL, COMPREHENSIVE    Collection Time: 09/01/22 11:13 AM   Result Value Ref Range    Sodium 133 (L) 136 - 145 mmol/L    Potassium 3.3 (L) 3.5 - 5.1 mmol/L    Chloride 97 97 - 108 mmol/L    CO2 30 21 - 32 mmol/L    Anion gap 6 5 - 15 mmol/L    Glucose 148 (H) 65 - 100 mg/dL    BUN 17 6 - 20 mg/dL    Creatinine 0.78 0.55 - 1.02 mg/dL    BUN/Creatinine ratio 22 (H) 12 - 20      GFR est AA >60 >60 ml/min/1.73m2    GFR est non-AA >60 >60 ml/min/1.73m2    Calcium 8.7 8.5 - 10.1 mg/dL    Bilirubin, total 0.6 0.2 - 1.0 mg/dL    AST (SGOT) 12 (L) 15 - 37 U/L    ALT (SGPT) 17 12 - 78 U/L    Alk. phosphatase 79 45 - 117 U/L    Protein, total 6.3 (L) 6.4 - 8.2 g/dL    Albumin 2.7 (L) 3.5 - 5.0 g/dL    Globulin 3.6 2.0 - 4.0 g/dL    A-G Ratio 0.8 (L) 1.1 - 2.2     CULTURE, BLOOD    Collection Time: 09/01/22 11:13 AM    Specimen: Blood   Result Value Ref Range    Special Requests: No Special Requests  Left  Antecubital        Culture result: No growth after 2 hours     GLUCOSE, POC    Collection Time: 09/01/22 11:19 AM   Result Value Ref Range    Glucose (POC) 141 (H) 65 - 100 mg/dL    Performed by He Brar        XR Results (maximum last 3): Results from [place] encounter on 08/30/22    XR CHEST PORT    Impression  No acute process. Results from Hospital Encounter encounter on 04/09/21    XR SPINE CERV W OBL/FLEX/EXT MIN 6 V COMP    Impression  Mild spondylosis at C4-5 and C5-6. CT Results (maximum last 3):   Results from [place] encounter on 08/30/22    CTA CHEST W OR W WO CONT    Impression  1. No evidence of pulmonary embolus. 2.  No acute thoracic pathology. Clear lungs. 3. Cardiomegaly.         Current Facility-Administered Medications:     enoxaparin (LOVENOX) injection 30 mg, 30 mg, SubCUTAneous, Q12H, Ant Cason MD, 30 mg at 08/31/22 2148    atorvastatin (LIPITOR) tablet 40 mg, 40 mg, Oral, DAILY, Ant Cason MD, 40 mg at 09/01/22 1120    cetirizine (ZYRTEC) tablet 10 mg, 10 mg, Oral, DAILY, Jennyfer Sheehan MD, 10 mg at 09/01/22 1120    FLUoxetine (PROzac) capsule 40 mg, 40 mg, Oral, DAILY, Jennyfer Sheehan MD, 40 mg at 09/01/22 1119    hydroCHLOROthiazide (HYDRODIURIL) tablet 25 mg, 25 mg, Oral, DAILY, Jennyfer Sheehan MD, 25 mg at 09/01/22 1120    therapeutic multivitamin (THERAGRAN) tablet 1 Tablet, 1 Tablet, Oral, DAILY, Ant Cason MD, 1 Tablet at 09/01/22 1120    pantoprazole (PROTONIX) tablet 40 mg, 40 mg, Oral, ACB, Ant Cason MD, 40 mg at 08/31/22 0713    pregabalin (LYRICA) capsule 300 mg, 300 mg, Oral, BID, Iva Cason MD, 300 mg at 09/01/22 1120    zolpidem (AMBIEN) tablet 5 mg, 5 mg, Oral, QHS PRN, Jennyfer Sheehan MD    insulin lispro (HUMALOG) injection, , SubCUTAneous, AC&HS, Jennyfer Sheehan MD, 2 Units at 08/31/22 2147    glucose chewable tablet 16 g, 4 Tablet, Oral, PRN, Iva Cason MD    glucagon (GLUCAGEN) injection 1 mg, 1 mg, IntraMUSCular, PRN, Jennyfer Sheehan MD    acetaminophen (TYLENOL) tablet 650 mg, 650 mg, Oral, Q6H PRN, 650 mg at 09/01/22 1120 **OR** acetaminophen (TYLENOL) suppository 650 mg, 650 mg, Rectal, Q6H PRN, Ant Cason MD    polyethylene glycol (MIRALAX) packet 17 g, 17 g, Oral, DAILY PRN, Ant Cason MD    ondansetron (ZOFRAN ODT) tablet 4 mg, 4 mg, Oral, Q8H PRN **OR** ondansetron (ZOFRAN) injection 4 mg, 4 mg, IntraVENous, Q6H PRN, Ant Cason MD    aspirin tablet 325 mg, 325 mg, Oral, DAILY, Ant Cason MD, 325 mg at 09/01/22 1120    Case discussed with collaborating physician Dr. Severiano Villaseñor  and our impression and recommendations are as follows:     Chest pain:   - c/o sharp non-radiating chest pain, associated with nausea  - HS Troponins are 116 > 229, repeat pending .   - Serial EKGs are nonischemic.   - Echocardiogram showed EF 60-65% with normal wall motion and function  - NMST is mormal, no evidence of inducible ischemia, EF 70%  - Continue telemetry monitoring.   -Continue asa.     2. Hypertension:   - blood pressure at goal  - continue home medications: HCTZ 25 mg daily    3. Hyperlipidemia:   - continue statin therapy: atorvastatin 40 mg daily  - LFTs within normal limits    Thank you for involving us in the care of this patient. Please do not hesitate to call if additional questions arise. If after hours please call [phone]. Dr. Alix Bazan Cardiology  [address] [facility].    [address] [facility], [address] [place] [address]  [phone]

## 2022-09-01 NOTE — PROGRESS NOTES
Spoke with patient about having her bring in her home cpap unit for nocturnal usage here in the hospital. Pt. States she has not used her home unit since December. Pt. Compliance of cpap stated not using because electrical plug not readily available in bedroom.

## 2022-09-01 NOTE — PROGRESS NOTES
PROGRESS NOTE    Patient: Lucinda Lopez MRN: [id number]  SSN: [id number]    YOB: 1965  Age: 65 y.o. Sex: female      Admit Date: 8/30/2022    LOS: 2 days       Subjective      CHIEF COMPLAINT: chest pain            HISTORY OF PRESENT ILLNESS:        Patient is a 65y.o. year old female with past medical history GERD hypercholesterolemia hypertension diabetes complains of 1 day history of non radiating substernal chest pain. Patient reports the pain is sharp, without noted aggravating relieving factors. Patient states the pain is moderate in intensity. Patient reports nausea without vomiting.     8/30- labs  WBC 16.1  Neutrophils 87  Absolute neutrophils 14.1  Potassium 3.0  Glucose 199  Trop 116 15:51 and 229 23:06  A1c 5.9     8/30- Imaging   XR CHEST Port- Impression: no acute process  CTA CHEST w or wo cont- Impression: 1. No evidence of pulmonary embolus. 2.  No acute thoracic pathology. Clear lungs. 3. Cardiomegaly. ECHO- impression: pending      8/31- general   Patient was out of her room at this time. 8/31- UA  Trace protein  Ketone 15  Blood small  Mucus 2+   WBC and RBC 5-10  Urine and blood culture- pending   TSH 0.17  Troponin 79     8/31- Labs  WBC 18.4  Neutrophils 90  Absolute neutrophil 16.7  Potassium 3.5  Neutrophils 90  Absolute neutrophils 16.7   Glucose 170    9/1- General  Patient was out of the room at this time for stress testing. 9/1- stress test  Results pending     While in the stress lab patient spiked fever to 101 ordered blood cultures urine cultures       ROS  Constitutional: Negative for chills and fever. HENT: Negative. Eyes: Negative. Respiratory: Negative. Cardiovascular: Negative. Gastrointestinal: Negative for abdominal pain and nausea. Skin: Negative. Neurological: Negative.       Objective     Visit Vitals  /61 (BP 1 Location: Right upper arm, BP Patient Position: At rest)   Pulse (!) 105   Temp 99 °F (37.2 °C)   Resp 18 Ht 5' 1\" (1.549 m)   Wt 117 kg (258 lb)   SpO2 95%   BMI 48.75 kg/m²      O2 Device: None (Room air)    Physical Exam:   Constitutional: pt is oriented to person, place, and time. HENT:   Head: Normocephalic and atraumatic. Eyes: Pupils are equal, round, and reactive to light. EOM are normal.   Cardiovascular: Normal rate, regular rhythm and normal heart sounds. Pulmonary/Chest: Breath sounds normal. No wheezes. No rales. Exhibits no tenderness. Abdominal: Soft. Bowel sounds are normal. There is no abdominal tenderness. There is no rebound and no guarding. Musculoskeletal: Normal range of motion. Neurological: pt is alert and oriented to person, place, and time. Alert. Normal strength. No cranial nerve deficit or sensory deficit. Displays a negative Romberg sign. Intake & Output:  Current Shift: No intake/output data recorded. Last three shifts: 08/30 1901 - 09/01 0700  In: 500 [P.O.:500]  Out: 250 [Urine:250]    Lab/Data Review: All lab results for the last 24 hours reviewed.        24 Hour Results:    Recent Results (from the past 24 hour(s))   URINALYSIS W/MICROSCOPIC    Collection Time: 08/31/22 12:45 PM   Result Value Ref Range    Color Dark Yellow      Appearance Hazy (A) Clear      Specific gravity 1.020 1.003 - 1.030      pH (UA) 5.0      Protein Trace (A) Negative mg/dL    Glucose Negative Negative mg/dL    Ketone 15 (A) Negative mg/dL    Bilirubin Negative Negative      Blood Small (A) Negative      Urobilinogen 1.0 0.2 - 1.0 EU/dL    Nitrites Negative Negative      Leukocyte Esterase Negative Negative      WBC 5-10 0 - 4 /hpf    RBC 5-10 0 - 5 /hpf    Bacteria Negative Negative /hpf    Mucus 2+ /lpf   TROPONIN-HIGH SENSITIVITY    Collection Time: 08/31/22  1:36 PM   Result Value Ref Range    Troponin-High Sensitivity 79 (H) 0 - 51 ng/L   TSH 3RD GENERATION    Collection Time: 08/31/22  1:36 PM   Result Value Ref Range    TSH 0.17 (L) 0.36 - 3.74 uIU/mL   GLUCOSE, POC    Collection Time: 08/31/22  4:07 PM   Result Value Ref Range    Glucose (POC) 146 (H) 65 - 100 mg/dL    Performed by Aminah Spears (Float)    GLUCOSE, POC    Collection Time: 08/31/22  8:36 PM   Result Value Ref Range    Glucose (POC) 210 (H) 65 - 100 mg/dL    Performed by Amberly 31 STRESS TEST    Collection Time: 09/01/22 11:00 AM   Result Value Ref Range    Stress Target  bpm    Baseline  bpm    Baseline Systolic  mmHg    Baseline Diastolic BP 74 mmHg    Stress Peak  bpm    Stress Percent HR Achieved 85 %    Stress Stage 1 Duration 1 min min:sec    Stress Stage 1  bpm    Stress Stage 1 /50 mmHg    Stress Stage 2 Duration 2 min min:sec    Stress Stage 2  bpm    Stress Stage 3 Duration 3 min min:sec    Stress Stage 3  bpm    Stress Stage 3 /80 mmHg    Stress Stage 4 Duration 4 min min:sec    Stress Stage 4  bpm    Stress Stage 4 /82 mmHg    Baseline ST Depression 0 mm    Nuc Stress EF 70 %   COVID-19 RAPID TEST    Collection Time: 09/01/22 11:08 AM   Result Value Ref Range    COVID-19 rapid test Not Detected Not Detected     CBC W/O DIFF    Collection Time: 09/01/22 11:13 AM   Result Value Ref Range    WBC 15.7 (H) 3.6 - 11.0 K/uL    RBC 3.67 (L) 3.80 - 5.20 M/uL    HGB 11.1 (L) 11.5 - 16.0 g/dL    HCT 33.7 (L) 35.0 - 47.0 %    MCV 91.8 80.0 - 99.0 FL    MCH 30.2 26.0 - 34.0 PG    MCHC 32.9 30.0 - 36.5 g/dL    RDW 13.2 11.5 - 14.5 %    PLATELET 957 901 - 287 K/uL    MPV 10.8 8.9 - 12.9 FL    NRBC 0.0 0.0  WBC    ABSOLUTE NRBC 0.00 0.00 - 6.82 K/uL   METABOLIC PANEL, COMPREHENSIVE    Collection Time: 09/01/22 11:13 AM   Result Value Ref Range    Sodium 133 (L) 136 - 145 mmol/L    Potassium 3.3 (L) 3.5 - 5.1 mmol/L    Chloride 97 97 - 108 mmol/L    CO2 30 21 - 32 mmol/L    Anion gap 6 5 - 15 mmol/L    Glucose 148 (H) 65 - 100 mg/dL    BUN 17 6 - 20 mg/dL    Creatinine 0.78 0.55 - 1.02 mg/dL    BUN/Creatinine ratio 22 (H) 12 - 20 GFR est AA >60 >60 ml/min/1.73m2    GFR est non-AA >60 >60 ml/min/1.73m2    Calcium 8.7 8.5 - 10.1 mg/dL    Bilirubin, total 0.6 0.2 - 1.0 mg/dL    AST (SGOT) 12 (L) 15 - 37 U/L    ALT (SGPT) 17 12 - 78 U/L    Alk. phosphatase 79 45 - 117 U/L    Protein, total 6.3 (L) 6.4 - 8.2 g/dL    Albumin 2.7 (L) 3.5 - 5.0 g/dL    Globulin 3.6 2.0 - 4.0 g/dL    A-G Ratio 0.8 (L) 1.1 - 2.2     GLUCOSE, POC    Collection Time: 09/01/22 11:19 AM   Result Value Ref Range    Glucose (POC) 141 (H) 65 - 100 mg/dL    Performed by [employer]          Imaging:    CTA CHEST W OR W WO CONT   Final Result   1. No evidence of pulmonary embolus. 2.  No acute thoracic pathology. Clear lungs. 3. Cardiomegaly. XR CHEST PORT   Final Result   No acute process.                 Assessment   MI r/o   hypercholesterolemia   hypertension   Diabetes  Leukocytosis  Hyperthyroidism     Stress test negative    Plan   Continue to follow cardiology recommendations- Nuclear medicine stress test results pending   Echo- pending  Continue to monitor HH, electrolytes, and kidney function   Continue current medications    Follow-up the cultures if stable possible discharge in next 24 hours        Medications:  Aspirin tablet 325mg  Atorvastatin tablet 40mg  Zyrtec tablet 10mg  Lovenox injection 40mg  Prozac capsule 40mg  Hydrochlorothiazide tablet 25mg  Humalog injection   Protonix tablet 40mg   Vale capsule 300mg   Theragran tablet   PRN ambien tablet 5mg      Current Facility-Administered Medications:     enoxaparin (LOVENOX) injection 30 mg, 30 mg, SubCUTAneous, Q12H, Ant Cason MD, 30 mg at 08/31/22 2148    atorvastatin (LIPITOR) tablet 40 mg, 40 mg, Oral, DAILY, Ant Cason MD, 40 mg at 09/01/22 1120    cetirizine (ZYRTEC) tablet 10 mg, 10 mg, Oral, DAILY, Ant Cason MD, 10 mg at 09/01/22 1120    FLUoxetine (PROzac) capsule 40 mg, 40 mg, Oral, DAILY, Ant Cason MD, 40 mg at 09/01/22 1119    hydroCHLOROthiazide (HYDRODIURIL) tablet 25 mg, 25 mg, Oral, DAILY, Ant Cason MD, 25 mg at 09/01/22 1120    therapeutic multivitamin (THERAGRAN) tablet 1 Tablet, 1 Tablet, Oral, DAILY, Ant Cason MD, 1 Tablet at 09/01/22 1120    pantoprazole (PROTONIX) tablet 40 mg, 40 mg, Oral, ACB, Ant Cason MD, 40 mg at 08/31/22 2048    pregabalin (LYRICA) capsule 300 mg, 300 mg, Oral, BID, Ranjit Cason MD, 300 mg at 09/01/22 1120    zolpidem (AMBIEN) tablet 5 mg, 5 mg, Oral, QHS PRN, Ranjit Cason MD    insulin lispro (HUMALOG) injection, , SubCUTAneous, AC&HS, Ant Cason MD, 2 Units at 08/31/22 2147    glucose chewable tablet 16 g, 4 Tablet, Oral, PRN, Ranjit Cason MD    glucagon (GLUCAGEN) injection 1 mg, 1 mg, IntraMUSCular, PRN, Ranjit Cason MD    acetaminophen (TYLENOL) tablet 650 mg, 650 mg, Oral, Q6H PRN, 650 mg at 09/01/22 1120 **OR** acetaminophen (TYLENOL) suppository 650 mg, 650 mg, Rectal, Q6H PRN, Ant Cason MD    polyethylene glycol (MIRALAX) packet 17 g, 17 g, Oral, DAILY PRN, Ranjit Cason MD    ondansetron (ZOFRAN ODT) tablet 4 mg, 4 mg, Oral, Q8H PRN **OR** ondansetron (ZOFRAN) injection 4 mg, 4 mg, IntraVENous, Q6H PRN, Ant Cason MD    aspirin tablet 325 mg, 325 mg, Oral, DAILY, Ant Cason MD, 325 mg at 09/01/22 1120    Current Outpatient Medications   Medication Instructions    adalimumab (HUMIRA) 40 mg, SubCUTAneous, EVERY 2 WEEKS    atorvastatin (LIPITOR) 40 mg, DAILY    cetirizine (ZYRTEC) 10 mg, DAILY    FLUoxetine (PROZAC) 40 mg, DAILY    hydroCHLOROthiazide (HYDRODIURIL) 25 mg, Oral, DAILY    multivitamin (ONE A DAY) tablet 1 Tablet, Oral, DAILY    omeprazole (PRILOSEC) 40 mg, DAILY    pregabalin (LYRICA) 300 mg, 2 TIMES DAILY    SUMAtriptan-naproxen (TREXIMET)  mg tablet Take one tablet at headache onset and repeat in 2 hours x1 prn    zolpidem (AMBIEN) 10 mg tablet BEDTIME PRN         Signed By: Jose Gross MD     September 1, 2022

## 2022-09-02 VITALS
SYSTOLIC BLOOD PRESSURE: 115 MMHG | HEIGHT: 61 IN | WEIGHT: 260.58 LBS | OXYGEN SATURATION: 97 % | HEART RATE: 102 BPM | TEMPERATURE: 98.1 F | RESPIRATION RATE: 24 BRPM | DIASTOLIC BLOOD PRESSURE: 65 MMHG | BODY MASS INDEX: 49.2 KG/M2

## 2022-09-02 LAB
ALBUMIN SERPL-MCNC: 2.5 G/DL (ref 3.5–5)
ALBUMIN/GLOB SERPL: 0.7 {RATIO} (ref 1.1–2.2)
ALP SERPL-CCNC: 85 U/L (ref 45–117)
ALT SERPL-CCNC: 17 U/L (ref 12–78)
ANION GAP SERPL CALC-SCNC: 7 MMOL/L (ref 5–15)
AST SERPL W P-5'-P-CCNC: 14 U/L (ref 15–37)
BILIRUB SERPL-MCNC: 0.6 MG/DL (ref 0.2–1)
BUN SERPL-MCNC: 16 MG/DL (ref 6–20)
BUN/CREAT SERPL: 29 (ref 12–20)
CA-I BLD-MCNC: 8.7 MG/DL (ref 8.5–10.1)
CHLORIDE SERPL-SCNC: 96 MMOL/L (ref 97–108)
CO2 SERPL-SCNC: 29 MMOL/L (ref 21–32)
CREAT SERPL-MCNC: 0.55 MG/DL (ref 0.55–1.02)
ERYTHROCYTE [DISTWIDTH] IN BLOOD BY AUTOMATED COUNT: 13 % (ref 11.5–14.5)
GLOBULIN SER CALC-MCNC: 3.8 G/DL (ref 2–4)
GLUCOSE BLD STRIP.AUTO-MCNC: 162 MG/DL (ref 65–100)
GLUCOSE SERPL-MCNC: 89 MG/DL (ref 65–100)
HCT VFR BLD AUTO: 31.4 % (ref 35–47)
HGB BLD-MCNC: 10.4 G/DL (ref 11.5–16)
MCH RBC QN AUTO: 30.5 PG (ref 26–34)
MCHC RBC AUTO-ENTMCNC: 33.1 G/DL (ref 30–36.5)
MCV RBC AUTO: 92.1 FL (ref 80–99)
NRBC # BLD: 0 K/UL (ref 0–0.01)
NRBC BLD-RTO: 0 PER 100 WBC
PERFORMED BY, TECHID: ABNORMAL
PLATELET # BLD AUTO: 203 K/UL (ref 150–400)
PMV BLD AUTO: 10.9 FL (ref 8.9–12.9)
POTASSIUM SERPL-SCNC: 3.3 MMOL/L (ref 3.5–5.1)
PROT SERPL-MCNC: 6.3 G/DL (ref 6.4–8.2)
RBC # BLD AUTO: 3.41 M/UL (ref 3.8–5.2)
SODIUM SERPL-SCNC: 132 MMOL/L (ref 136–145)
WBC # BLD AUTO: 9.7 K/UL (ref 3.6–11)

## 2022-09-02 PROCEDURE — 82962 GLUCOSE BLOOD TEST: CPT

## 2022-09-02 PROCEDURE — 74011250637 HC RX REV CODE- 250/637: Performed by: FAMILY MEDICINE

## 2022-09-02 PROCEDURE — 36415 COLL VENOUS BLD VENIPUNCTURE: CPT

## 2022-09-02 PROCEDURE — G0378 HOSPITAL OBSERVATION PER HR: HCPCS

## 2022-09-02 PROCEDURE — 85027 COMPLETE CBC AUTOMATED: CPT

## 2022-09-02 PROCEDURE — 80053 COMPREHEN METABOLIC PANEL: CPT

## 2022-09-02 PROCEDURE — 96372 THER/PROPH/DIAG INJ SC/IM: CPT

## 2022-09-02 PROCEDURE — 74011250636 HC RX REV CODE- 250/636: Performed by: FAMILY MEDICINE

## 2022-09-02 RX ORDER — POTASSIUM CHLORIDE 750 MG/1
40 TABLET, FILM COATED, EXTENDED RELEASE ORAL
Status: COMPLETED | OUTPATIENT
Start: 2022-09-02 | End: 2022-09-02

## 2022-09-02 RX ORDER — ASPIRIN 325 MG
325 TABLET ORAL DAILY
Qty: 30 TABLET | Refills: 0 | Status: SHIPPED | OUTPATIENT
Start: 2022-09-03

## 2022-09-02 RX ADMIN — PANTOPRAZOLE SODIUM 40 MG: 40 TABLET, DELAYED RELEASE ORAL at 08:17

## 2022-09-02 RX ADMIN — PREGABALIN 300 MG: 150 CAPSULE ORAL at 08:18

## 2022-09-02 RX ADMIN — ATORVASTATIN CALCIUM 40 MG: 40 TABLET, FILM COATED ORAL at 08:18

## 2022-09-02 RX ADMIN — POTASSIUM CHLORIDE 40 MEQ: 750 TABLET, FILM COATED, EXTENDED RELEASE ORAL at 12:21

## 2022-09-02 RX ADMIN — FLUOXETINE 40 MG: 20 CAPSULE ORAL at 08:17

## 2022-09-02 RX ADMIN — CETIRIZINE HYDROCHLORIDE 10 MG: 10 TABLET, FILM COATED ORAL at 08:18

## 2022-09-02 RX ADMIN — TRAMADOL HYDROCHLORIDE 50 MG: 50 TABLET, COATED ORAL at 00:10

## 2022-09-02 RX ADMIN — ACETAMINOPHEN 650 MG: 325 TABLET, FILM COATED ORAL at 08:20

## 2022-09-02 RX ADMIN — THERA TABS 1 TABLET: TAB at 08:18

## 2022-09-02 RX ADMIN — ASPIRIN 325 MG ORAL TABLET 325 MG: 325 PILL ORAL at 08:17

## 2022-09-02 RX ADMIN — HYDROCHLOROTHIAZIDE 25 MG: 25 TABLET ORAL at 08:18

## 2022-09-02 RX ADMIN — ENOXAPARIN SODIUM 30 MG: 100 INJECTION SUBCUTANEOUS at 08:47

## 2022-09-02 RX ADMIN — TRAMADOL HYDROCHLORIDE 50 MG: 50 TABLET, COATED ORAL at 08:20

## 2022-09-02 NOTE — PROGRESS NOTES
Problem: Falls - Risk of  Goal: *Absence of Falls  Description: Document Gutierrez Batista Fall Risk and appropriate interventions in the flowsheet.   Outcome: Progressing Towards Goal  Note: Fall Risk Interventions:            Medication Interventions: Bed/chair exit alarm                   Problem: Patient Education: Go to Patient Education Activity  Goal: Patient/Family Education  Outcome: Progressing Towards Goal     Problem: Unstable angina/NSTEMI: Day of Admission/Day 1  Goal: Treatments/Interventions/Procedures  Outcome: Progressing Towards Goal

## 2022-09-02 NOTE — PROGRESS NOTES
Patient will discharge home alone. Medicare pt has received, reviewed, and signed 2nd IM letter informing them of their right to appeal the discharge. Signed copied has been placed on pt bedside chart.

## 2022-09-02 NOTE — PROGRESS NOTES
PROGRESS NOTE    Patient: Gloria Calvillo MRN: [id number]  SSN: [id number]    YOB: 1965  Age: 65 y.o. Sex: female      Admit Date: 8/30/2022    LOS: 3 days       Subjective   CHIEF COMPLAINT: chest pain            HISTORY OF PRESENT ILLNESS:        Patient is a 65y.o. year old female with past medical history GERD hypercholesterolemia hypertension diabetes complains of 1 day history of non radiating substernal chest pain. Patient reports the pain is sharp, without noted aggravating relieving factors. Patient states the pain is moderate in intensity. Patient reports nausea without vomiting.     8/30- labs  WBC 16.1  Neutrophils 87  Absolute neutrophils 14.1  Potassium 3.0  Glucose 199  Trop 116 15:51 and 229 23:06  A1c 5.9     8/30- Imaging   XR CHEST Port- Impression: no acute process  CTA CHEST w or wo cont- Impression: 1. No evidence of pulmonary embolus. 2.  No acute thoracic pathology. Clear lungs. 3. Cardiomegaly. ECHO- impression: pending      8/31- general   Patient was out of her room at this time. 8/31- UA  Trace protein  Ketone 15  Blood small  Mucus 2+   WBC and RBC 5-10  Urine and blood culture- pending   TSH 0.17  Troponin 79    Urine Culture: Mixed urogenital reinaldo isolated     8/31- Labs  WBC 18.4  Neutrophils 90  Absolute neutrophil 16.7  Potassium 3.5  Neutrophils 90  Absolute neutrophils 16.7   Glucose 170    8/31- Echo    Left Ventricle: Normal left ventricular systolic function with a visually estimated EF of 60 - 65%. Left ventricle size is normal. Normal wall thickness. Normal wall motion. Normal diastolic function. Technical qualifiers: Echo study was technically difficult with poor endocardial visualization and technically difficult due to patient's body habitus. Contrast used: Definity. 9/1- General  Patient was out of the room at this time for stress testing.       9/1- stress test    Nuclear Findings: LV perfusion is normal. There is no evidence of inducible ischemia. Nuclear Findings: Normal left ventricular systolic function post-stress. Nuclear Findings: Normal pharmacological myocardial perfusion study. ECG: Stress ECG was negative for ischemia. Post-stress ejection fraction is 70%. While in the stress lab patient spiked fever to 101 ordered blood cultures urine cultures    Blood culture- no growth     9/2- General   Patient was lying comfortably in bed. Denise stated she had body aches and a headache but denies F/C/N/V/D.     9/2- Labs  WBC 9.7  HGB 10.4  Sodium 132  Potassium 3.3     ROS  Constitutional: Negative for chills and fever. HENT: Negative. Eyes: Negative. Respiratory: Negative. Cardiovascular: Negative. Gastrointestinal: Negative for abdominal pain and nausea. Skin: Negative. Neurological: Negative. Objective     Visit Vitals  /65 (BP 1 Location: Left lower arm, BP Patient Position: At rest)   Pulse (!) 102   Temp 98.1 °F (36.7 °C)   Resp 24   Ht 5' 1\" (1.549 m)   Wt 118.2 kg (260 lb 9.3 oz)   SpO2 97%   BMI 49.24 kg/m²      O2 Device: None (Room air)    Physical Exam:   Constitutional: pt is oriented to person, place, and time. HENT:   Head: Normocephalic and atraumatic. Eyes: Pupils are equal, round, and reactive to light. EOM are normal.   Cardiovascular: Normal rate, regular rhythm and normal heart sounds. Pulmonary/Chest: Breath sounds normal. No wheezes. No rales. Exhibits no tenderness. Abdominal: Soft. Bowel sounds are normal. There is no abdominal tenderness. There is no rebound and no guarding. Musculoskeletal: Normal range of motion. Neurological: pt is alert and oriented to person, place, and time. Alert. Normal strength. No cranial nerve deficit or sensory deficit. Displays a negative Romberg sign. Intake & Output:  Current Shift: No intake/output data recorded. Last three shifts: No intake/output data recorded. Lab/Data Review:   All lab results for the last 24 hours reviewed.        24 Hour Results:    Recent Results (from the past 24 hour(s))   NUCLEAR CARDIAC STRESS TEST    Collection Time: 09/01/22 11:00 AM   Result Value Ref Range    Stress Target  bpm    Baseline  bpm    Baseline Systolic  mmHg    Baseline Diastolic BP 74 mmHg    Stress Peak  bpm    Stress Percent HR Achieved 85 %    Stress Stage 1 Duration 1 min min:sec    Stress Stage 1  bpm    Stress Stage 1 /50 mmHg    Stress Stage 2 Duration 2 min min:sec    Stress Stage 2  bpm    Stress Stage 3 Duration 3 min min:sec    Stress Stage 3  bpm    Stress Stage 3 /80 mmHg    Stress Stage 4 Duration 4 min min:sec    Stress Stage 4  bpm    Stress Stage 4 /82 mmHg    Baseline ST Depression 0 mm    Nuc Stress EF 70 %   COVID-19 RAPID TEST    Collection Time: 09/01/22 11:08 AM   Result Value Ref Range    COVID-19 rapid test Not Detected Not Detected     CBC W/O DIFF    Collection Time: 09/01/22 11:13 AM   Result Value Ref Range    WBC 15.7 (H) 3.6 - 11.0 K/uL    RBC 3.67 (L) 3.80 - 5.20 M/uL    HGB 11.1 (L) 11.5 - 16.0 g/dL    HCT 33.7 (L) 35.0 - 47.0 %    MCV 91.8 80.0 - 99.0 FL    MCH 30.2 26.0 - 34.0 PG    MCHC 32.9 30.0 - 36.5 g/dL    RDW 13.2 11.5 - 14.5 %    PLATELET 483 709 - 178 K/uL    MPV 10.8 8.9 - 12.9 FL    NRBC 0.0 0.0  WBC    ABSOLUTE NRBC 0.00 0.00 - 3.26 K/uL   METABOLIC PANEL, COMPREHENSIVE    Collection Time: 09/01/22 11:13 AM   Result Value Ref Range    Sodium 133 (L) 136 - 145 mmol/L    Potassium 3.3 (L) 3.5 - 5.1 mmol/L    Chloride 97 97 - 108 mmol/L    CO2 30 21 - 32 mmol/L    Anion gap 6 5 - 15 mmol/L    Glucose 148 (H) 65 - 100 mg/dL    BUN 17 6 - 20 mg/dL    Creatinine 0.78 0.55 - 1.02 mg/dL    BUN/Creatinine ratio 22 (H) 12 - 20      GFR est AA >60 >60 ml/min/1.73m2    GFR est non-AA >60 >60 ml/min/1.73m2    Calcium 8.7 8.5 - 10.1 mg/dL    Bilirubin, total 0.6 0.2 - 1.0 mg/dL    AST (SGOT) 12 (L) 15 - 37 U/L    ALT (SGPT) 17 12 - 78 U/L    Alk. phosphatase 79 45 - 117 U/L    Protein, total 6.3 (L) 6.4 - 8.2 g/dL    Albumin 2.7 (L) 3.5 - 5.0 g/dL    Globulin 3.6 2.0 - 4.0 g/dL    A-G Ratio 0.8 (L) 1.1 - 2.2     CULTURE, BLOOD    Collection Time: 09/01/22 11:13 AM    Specimen: Blood   Result Value Ref Range    Special Requests: No Special Requests  Left  Antecubital        Culture result: No growth after 7 hours     GLUCOSE, POC    Collection Time: 09/01/22 11:19 AM   Result Value Ref Range    Glucose (POC) 141 (H) 65 - 100 mg/dL    Performed by Fam Diego 55, POC    Collection Time: 09/01/22  5:06 PM   Result Value Ref Range    Glucose (POC) 107 (H) 65 - 100 mg/dL    Performed by Wilder Pérez (Float)    GLUCOSE, POC    Collection Time: 09/01/22  7:56 PM   Result Value Ref Range    Glucose (POC) 112 (H) 65 - 100 mg/dL    Performed by Osiel Lau    CBC W/O DIFF    Collection Time: 09/02/22  7:51 AM   Result Value Ref Range    WBC 9.7 3.6 - 11.0 K/uL    RBC 3.41 (L) 3.80 - 5.20 M/uL    HGB 10.4 (L) 11.5 - 16.0 g/dL    HCT 31.4 (L) 35.0 - 47.0 %    MCV 92.1 80.0 - 99.0 FL    MCH 30.5 26.0 - 34.0 PG    MCHC 33.1 30.0 - 36.5 g/dL    RDW 13.0 11.5 - 14.5 %    PLATELET 029 388 - 474 K/uL    MPV 10.9 8.9 - 12.9 FL    NRBC 0.0 0.0  WBC    ABSOLUTE NRBC 0.00 0.00 - 8.89 K/uL   METABOLIC PANEL, COMPREHENSIVE    Collection Time: 09/02/22  7:51 AM   Result Value Ref Range    Sodium 132 (L) 136 - 145 mmol/L    Potassium 3.3 (L) 3.5 - 5.1 mmol/L    Chloride 96 (L) 97 - 108 mmol/L    CO2 29 21 - 32 mmol/L    Anion gap 7 5 - 15 mmol/L    Glucose 89 65 - 100 mg/dL    BUN 16 6 - 20 mg/dL    Creatinine 0.55 0.55 - 1.02 mg/dL    BUN/Creatinine ratio 29 (H) 12 - 20      GFR est AA >60 >60 ml/min/1.73m2    GFR est non-AA >60 >60 ml/min/1.73m2    Calcium 8.7 8.5 - 10.1 mg/dL    Bilirubin, total 0.6 0.2 - 1.0 mg/dL    AST (SGOT) 14 (L) 15 - 37 U/L    ALT (SGPT) 17 12 - 78 U/L    Alk.  phosphatase 85 45 - 117 U/L    Protein, total 6.3 (L) 6.4 - 8.2 g/dL    Albumin 2.5 (L) 3.5 - 5.0 g/dL    Globulin 3.8 2.0 - 4.0 g/dL    A-G Ratio 0.7 (L) 1.1 - 2.2     GLUCOSE, POC    Collection Time: 09/02/22  8:31 AM   Result Value Ref Range    Glucose (POC) 162 (H) 65 - 100 mg/dL    Performed by Izabel Burnham          Imaging:    CTA CHEST W OR W WO CONT   Final Result   1. No evidence of pulmonary embolus. 2.  No acute thoracic pathology. Clear lungs. 3. Cardiomegaly. XR CHEST PORT   Final Result   No acute process.                 Assessment   MI r/o   hypercholesterolemia   hypertension   Diabetes  Leukocytosis  Hyperthyroidism   Stress test negative    Plan   Continue to follow cardiology recommendations  Continue to monitor HH, electrolytes, and kidney function   Continue current medications  Supplement potassium     Follow-up the cultures if stable possible discharge in next 24 hours     Medications:  Aspirin tablet 325mg  Atorvastatin tablet 40mg  Zyrtec tablet 10mg  Lovenox injection 40mg  Prozac capsule 40mg  Hydrochlorothiazide tablet 25mg  Humalog injection   Protonix tablet 40mg   Vale capsule 300mg   Theragran tablet   PRN ambien tablet 5mg         Current Facility-Administered Medications:     traMADoL (ULTRAM) tablet 50 mg, 50 mg, Oral, Q6H PRN, Ant Cason MD, 50 mg at 09/02/22 0820    enoxaparin (LOVENOX) injection 30 mg, 30 mg, SubCUTAneous, Q12H, Ant Cason MD, 30 mg at 09/02/22 0847    atorvastatin (LIPITOR) tablet 40 mg, 40 mg, Oral, DAILY, Ant Cason MD, 40 mg at 09/02/22 0818    cetirizine (ZYRTEC) tablet 10 mg, 10 mg, Oral, DAILY, Ant Cason MD, 10 mg at 09/02/22 0818    FLUoxetine (PROzac) capsule 40 mg, 40 mg, Oral, DAILY, Ant Cason MD, 40 mg at 09/02/22 0882    hydroCHLOROthiazide (HYDRODIURIL) tablet 25 mg, 25 mg, Oral, DAILY, Ant Cason MD, 25 mg at 09/02/22 0818    therapeutic multivitamin (THERAGRAN) tablet 1 Tablet, 1 Tablet, Oral, DAILY, Dea Cason MD, 1 Tablet at 09/02/22 0818    pantoprazole (PROTONIX) tablet 40 mg, 40 mg, Oral, ACB, Ant Cason MD, 40 mg at 09/02/22 0817    pregabalin (LYRICA) capsule 300 mg, 300 mg, Oral, BID, Mohiuddin, Gretel Lundborg, MD, 300 mg at 09/02/22 0818    zolpidem (AMBIEN) tablet 5 mg, 5 mg, Oral, QHS PRN, Mohiuddin, Gretel Lundborg, MD    insulin lispro (HUMALOG) injection, , SubCUTAneous, AC&HS, Ant Cason MD, 2 Units at 08/31/22 2147    glucose chewable tablet 16 g, 4 Tablet, Oral, PRN, Mohiuddin, Gretel Lundborg, MD    glucagon (GLUCAGEN) injection 1 mg, 1 mg, IntraMUSCular, PRN, Mohiuddin, Gretel Lundborg, MD    acetaminophen (TYLENOL) tablet 650 mg, 650 mg, Oral, Q6H PRN, 650 mg at 09/02/22 0820 **OR** acetaminophen (TYLENOL) suppository 650 mg, 650 mg, Rectal, Q6H PRN, Ant Cason MD    polyethylene glycol (MIRALAX) packet 17 g, 17 g, Oral, DAILY PRN, Mohiuddin, Gretel Lundborg, MD    ondansetron (ZOFRAN ODT) tablet 4 mg, 4 mg, Oral, Q8H PRN **OR** ondansetron (ZOFRAN) injection 4 mg, 4 mg, IntraVENous, Q6H PRN, Ant Cason MD    aspirin tablet 325 mg, 325 mg, Oral, DAILY, Ant Cason MD, 325 mg at 09/02/22 0450    Current Outpatient Medications   Medication Instructions    adalimumab (HUMIRA) 40 mg, SubCUTAneous, EVERY 2 WEEKS    atorvastatin (LIPITOR) 40 mg, DAILY    cetirizine (ZYRTEC) 10 mg, DAILY    FLUoxetine (PROZAC) 40 mg, DAILY    hydroCHLOROthiazide (HYDRODIURIL) 25 mg, Oral, DAILY    multivitamin (ONE A DAY) tablet 1 Tablet, Oral, DAILY    omeprazole (PRILOSEC) 40 mg, DAILY    pregabalin (LYRICA) 300 mg, 2 TIMES DAILY    SUMAtriptan-naproxen (TREXIMET)  mg tablet Take one tablet at headache onset and repeat in 2 hours x1 prn    zolpidem (AMBIEN) 10 mg tablet BEDTIME PRN         Signed By: Aimee Sheehan     September 2, 2022

## 2022-09-02 NOTE — PROGRESS NOTES
Problem: Falls - Risk of  Goal: *Absence of Falls  Description: Document Erica Finley Fall Risk and appropriate interventions in the flowsheet.   Outcome: Progressing Towards Goal  Note: Fall Risk Interventions:            Medication Interventions: Bed/chair exit alarm                   Problem: Unstable angina/NSTEMI: Day of Admission/Day 1  Goal: Consults, if ordered  Outcome: Progressing Towards Goal  Goal: Treatments/Interventions/Procedures  Outcome: Progressing Towards Goal

## 2022-09-02 NOTE — DISCHARGE INSTRUCTIONS
Discharge Instructions       PATIENT ID: Martha Mayfield  MRN: [id number]   YOB: 1965    DATE OF ADMISSION: [unfilled]    DATE OF DISCHARGE: 9/2/2022    PRIMARY CARE PROVIDER: @PCP@     ATTENDING PHYSICIAN: [unfilled]  DISCHARGING PROVIDER: Matilda Snow MD    To contact this individual call [phone] and ask the  to page. If unavailable ask to be transferred the Adult Hospitalist Department. DISCHARGE DIAGNOSES chest pain    CONSULTATIONS: [unfilled]    PROCEDURES/SURGERIES: * No surgery found *    PENDING TEST RESULTS:   At the time of discharge the following test results are still pending: None    [facility]MENTS[facility]   [facility]     ADDITIONAL CARE RECOMMENDATIONS: Follow-up with PCP if patient spikes fever again    DIET: Cardiac Diet      ACTIVITY: Activity as tolerated    Wound care: Wound Care Order: submitted to [facility] Please view [email or web address]    EQUIPMENT needed: ***      DISCHARGE MEDICATIONS:   See Medication Reconciliation Form    It is important that you take the medication exactly as they are prescribed. Keep your medication in the bottles provided by the pharmacist and keep a list of the medication names, dosages, and times to be taken in your wallet. Do not take other medications without consulting your doctor. NOTIFY YOUR PHYSICIAN FOR ANY OF THE FOLLOWING:   Fever over 101 degrees for 24 hours. Chest pain, shortness of breath, fever, chills, nausea, vomiting, diarrhea, change in mentation, falling, weakness, bleeding. Severe pain or pain not relieved by medications. Or, any other signs or symptoms that you may have questions about.       DISPOSITION:    Home With:   OT  PT  HH  RN       SNF/Inpatient Rehab/LTAC    Independent/assisted living    Hospice    Other:         PROBLEM LIST Updated:  Yes ***       Signed:   Matilda Snow MD  9/2/2022  11:50 AM    Discharge Instructions       PATIENT ID: Martha Mayfield  MRN: [id number]   YOB: 1965    DATE OF ADMISSION: [unfilled]    DATE OF DISCHARGE: 9/2/2022    PRIMARY CARE PROVIDER: @PCP@     ATTENDING PHYSICIAN: [unfilled]  DISCHARGING PROVIDER: Alyssa Wells MD    To contact this individual call [phone] and ask the  to page. If unavailable ask to be transferred the Adult Hospitalist Department. DISCHARGE DIAGNOSES ***    CONSULTATIONS: [unfilled]    PROCEDURES/SURGERIES: * No surgery found *    PENDING TEST RESULTS:   At the time of discharge the following test results are still pending: ***    [facility]   [facility]     ADDITIONAL CARE RECOMMENDATIONS: ***    DIET: {diet:00837}      ACTIVITY: {discharge activity:97739}    Wound care: {[facility] Wound Care Instructions:94257}    EQUIPMENT needed: ***      DISCHARGE MEDICATIONS:   See Medication Reconciliation Form    It is important that you take the medication exactly as they are prescribed. Keep your medication in the bottles provided by the pharmacist and keep a list of the medication names, dosages, and times to be taken in your wallet. Do not take other medications without consulting your doctor. NOTIFY YOUR PHYSICIAN FOR ANY OF THE FOLLOWING:   Fever over 101 degrees for 24 hours. Chest pain, shortness of breath, fever, chills, nausea, vomiting, diarrhea, change in mentation, falling, weakness, bleeding. Severe pain or pain not relieved by medications. Or, any other signs or symptoms that you may have questions about.       DISPOSITION:    Home With:   OT  PT  HH  RN       SNF/Inpatient Rehab/LTAC    Independent/assisted living    Hospice    Other:         PROBLEM LIST Updated:  Yes ***       Signed:   Alyssa Wells MD  9/2/2022  11:50 AM

## 2022-09-02 NOTE — DISCHARGE SUMMARY
Discharge Summary       PATIENT ID: Blane Ramachandran  MRN: [id number]   YOB: 1965    DATE OF ADMISSION: 8/30/2022  3:31 PM    DATE OF DISCHARGE:   PRIMARY CARE PROVIDER: Terrell Bob DO     ATTENDING PHYSICIAN: Elisabeth Cason  DISCHARGING PROVIDER: Elisabeth Cason      CONSULTATIONS: IP CONSULT TO HOSPITALIST  IP CONSULT TO CARDIOLOGY    PROCEDURES/SURGERIES: * No surgery found *    ADMITTING DIAGNOSES:    Patient Active Problem List    Diagnosis Date Noted    Chest pain 08/30/2022    Migraine without aura and without status migrainosus, not intractable 05/21/2017    Regional enteritis of unspecified site 08/20/2011    Anemia, unspecified 08/20/2011       DISCHARGE DIAGNOSES / PLAN:      MI r/o   hypercholesterolemia   hypertension   Diabetes  Leukocytosis  Hyperthyroidism   Stress test negative:         DISCHARGE MEDICATIONS:  Current Discharge Medication List        START taking these medications    Details   aspirin (ASPIRIN) 325 mg tablet Take 1 Tablet by mouth daily. Qty: 30 Tablet, Refills: 0  Start date: 9/3/2022           CONTINUE these medications which have NOT CHANGED    Details   multivitamin (ONE A DAY) tablet Take 1 Tab by mouth daily. hydroCHLOROthiazide (HYDRODIURIL) 25 mg tablet Take 25 mg by mouth daily. cetirizine (ZYRTEC) 10 mg tablet Take 10 mg by mouth daily. omeprazole (PRILOSEC) 40 mg capsule Take 40 mg by mouth daily. atorvastatin (LIPITOR) 20 mg tablet Take 40 mg by mouth daily. FLUoxetine (PROzac) 40 mg capsule Take 40 mg by mouth daily.            STOP taking these medications       adalimumab (HUMIRA) 40 mg/0.8 mL injection pen Comments:   Reason for Stopping:         pregabalin (LYRICA) 300 mg capsule Comments:   Reason for Stopping:         zolpidem (AMBIEN) 10 mg tablet Comments:   Reason for Stopping:         SUMAtriptan-naproxen (TREXIMET)  mg tablet Comments:   Reason for Stopping:                 NOTIFY [address] OF THE FOLLOWING:   Fever over 101 degrees for 24 hours. Chest pain, shortness of breath, fever, chills, nausea, vomiting, diarrhea, change in mentation, falling, weakness, bleeding. Severe pain or pain not relieved by medications. Or, any other signs or symptoms that you may have questions about. DISPOSITION:  x  Home With:   OT  PT  HH  RN       Long term SNF/Inpatient Rehab    Independent/assisted living    Hospice    Other:       PATIENT CONDITION AT DISCHARGE: Stable      PHYSICAL EXAMINATION AT DISCHARGE:  General:          Alert, cooperative, no distress, appears stated age. HEENT:           Atraumatic, anicteric sclerae, pink conjunctivae                          No oral ulcers, mucosa moist, throat clear, dentition fair  Neck:               Supple, symmetrical  Lungs:             Clear to auscultation bilaterally. No Wheezing or Rhonchi. No rales. Chest wall:      No tenderness  No Accessory muscle use. Heart:              Regular  rhythm,  No  murmur   No edema  Abdomen:        Soft, non-tender. Not distended. Bowel sounds normal  Extremities:     No cyanosis. No clubbing,                            Skin turgor normal, Capillary refill normal  Skin:                Not pale. Not Jaundiced  No rashes   Psych:             Not anxious or agitated. Neurologic:      Alert, moves all extremities, answers questions appropriately and responds to commands     CTA CHEST W OR W WO CONT   Final Result   1. No evidence of pulmonary embolus. 2.  No acute thoracic pathology. Clear lungs. 3. Cardiomegaly. XR CHEST PORT   Final Result   No acute process.               Recent Results (from the past 24 hour(s))   GLUCOSE, POC    Collection Time: 09/01/22  5:06 PM   Result Value Ref Range    Glucose (POC) 107 (H) 65 - 100 mg/dL    Performed by Jaye Grimm)    GLUCOSE, POC    Collection Time: 09/01/22  7:56 PM   Result Value Ref Range    Glucose (POC) 112 (H) 65 - 100 mg/dL Performed by Dionna Woodson    CBC W/O DIFF    Collection Time: 09/02/22  7:51 AM   Result Value Ref Range    WBC 9.7 3.6 - 11.0 K/uL    RBC 3.41 (L) 3.80 - 5.20 M/uL    HGB 10.4 (L) 11.5 - 16.0 g/dL    HCT 31.4 (L) 35.0 - 47.0 %    MCV 92.1 80.0 - 99.0 FL    MCH 30.5 26.0 - 34.0 PG    MCHC 33.1 30.0 - 36.5 g/dL    RDW 13.0 11.5 - 14.5 %    PLATELET 623 909 - 418 K/uL    MPV 10.9 8.9 - 12.9 FL    NRBC 0.0 0.0  WBC    ABSOLUTE NRBC 0.00 0.00 - 0.06 K/uL   METABOLIC PANEL, COMPREHENSIVE    Collection Time: 09/02/22  7:51 AM   Result Value Ref Range    Sodium 132 (L) 136 - 145 mmol/L    Potassium 3.3 (L) 3.5 - 5.1 mmol/L    Chloride 96 (L) 97 - 108 mmol/L    CO2 29 21 - 32 mmol/L    Anion gap 7 5 - 15 mmol/L    Glucose 89 65 - 100 mg/dL    BUN 16 6 - 20 mg/dL    Creatinine 0.55 0.55 - 1.02 mg/dL    BUN/Creatinine ratio 29 (H) 12 - 20      GFR est AA >60 >60 ml/min/1.73m2    GFR est non-AA >60 >60 ml/min/1.73m2    Calcium 8.7 8.5 - 10.1 mg/dL    Bilirubin, total 0.6 0.2 - 1.0 mg/dL    AST (SGOT) 14 (L) 15 - 37 U/L    ALT (SGPT) 17 12 - 78 U/L    Alk. phosphatase 85 45 - 117 U/L    Protein, total 6.3 (L) 6.4 - 8.2 g/dL    Albumin 2.5 (L) 3.5 - 5.0 g/dL    Globulin 3.8 2.0 - 4.0 g/dL    A-G Ratio 0.7 (L) 1.1 - 2.2     GLUCOSE, POC    Collection Time: 09/02/22  8:31 AM   Result Value Ref Range    Glucose (POC) 162 (H) 65 - 100 mg/dL    Performed by [address] [place] [address]:    Patient is a 65y.o. year old female with past medical history GERD hypercholesterolemia hypertension diabetes complains of 1 day history of non radiating substernal chest pain. Patient reports the pain is sharp, without noted aggravating relieving factors. Patient states the pain is moderate in intensity.   Patient reports nausea without vomiting.     8/30- labs  WBC 16.1  Neutrophils 87  Absolute neutrophils 14.1  Potassium 3.0  Glucose 199  Trop 116 15:51 and 229 23:06  A1c 5.9     8/30- Imaging   XR CHEST Port- Impression: no acute process  CTA CHEST w or wo cont- Impression: 1. No evidence of pulmonary embolus. 2.  No acute thoracic pathology. Clear lungs. 3. Cardiomegaly. ECHO- impression: pending      8/31- [facility]   Patient was out of her room at this time. 8/31- UA  Trace protein  Ketone 15  Blood small  Mucus 2+   WBC and RBC 5-10  Urine and blood culture- pending   TSH 0.17  Troponin 79     Urine Culture: Mixed urogenital reinaldo isolated     8/31- Labs  WBC 18.4  Neutrophils 90  Absolute neutrophil 16.7  Potassium 3.5  Neutrophils 90  Absolute neutrophils 16.7   Glucose 170     8/31- Echo    Left Ventricle: Normal left ventricular systolic function with a visually estimated EF of 60 - 65%. Left ventricle size is normal. Normal wall thickness. Normal wall motion. Normal diastolic function. Technical qualifiers: Echo study was technically difficult with poor endocardial visualization and technically difficult due to patient's body habitus. Contrast used: Definity. 9/1- [facility]  Patient was out of the room at this time for stress testing. 9/1- stress test    Nuclear Findings: LV perfusion is normal. There is no evidence of inducible ischemia. Nuclear Findings: Normal left ventricular systolic function post-stress. Nuclear Findings: Normal pharmacological myocardial perfusion study. ECG: Stress ECG was negative for ischemia. Post-stress ejection fraction is 70%. While in the stress lab patient spiked fever to 101 ordered blood cultures urine cultures     Blood culture- no growth      9/2- [facility]   Patient was lying comfortably in bed. Denise stated she had body aches and a headache but denies F/C/N/V/D.      Patient have no fever no chills today resting in the bed comfortably in    All cultures are negative so far  COVID screening negative    Discharge home and follow-up with PCP and cardiology as an outpatient     9/2- Labs  WBC 9.7  HGB 10.4  Sodium 132  Potassium 3.3      Signed:   Allan Weaver MD  9/2/2022  11:51 AM

## 2022-09-02 NOTE — PROGRESS NOTES
Cardiology Progress Note    CHIEF COMPLAINT:    Chief Complaint   Patient presents with    Chest Pain    Nausea     HISTORY OF PRESENT ILLNESS:  Ivette Lau is a 65y.o. year-old female with past medical history significant for hypertension, hyperlipidemia, obstructive sleep apnea, fibromyalgia, depression, and Crohn's disease who was admitted to the hospital for further evaluation of chest pain for the past 24 hours. On examination, patient is sitting upright in the bed, does not appear to be in any acute distress. Patient states she developed sharp, non-radiating chest pain while she was eating. Associated factors include nausea. There were no aggravating or relieving factors prompting the patient to seek medical treatment. 9/1: Patient was seen and examined in the cardiovascular lab prior to a nuclear medicine stress test. Patient denies active chest pain or discomfort. She is febrile this morning with a temp of 101.1 orally. 9/2: Patient is awake and alert this morning. She denies active chest pain or discomfort. Afebrile this am.     Records from hospital admission course thus far reviewed. Telemetry Review: No acute events noted since admission. Remains in NSR.        PAST MEDICAL HISTORY:    Past Medical History:   Diagnosis Date    Anemia, unspecified 8/20/2011    Autoimmune disease ([facility] [address]     fibromyalgia    Coagulation defects     anemia    Crohn's disease ([facility] [address]     Depression     Fibromyalgia     GERD (gastroesophageal reflux disease)     Hypercholesterolemia     Hypertension     Musculoskeletal disorder     Other ill-defined conditions(799.89)     crohns    Psychiatric disorder     depression    Unspecified sleep apnea     cpap       PAST SURGICAL HISTORY:   Past Surgical History:   Procedure Laterality Date    COLONOSCOPY N/A 8/24/2020    COLONOSCOPY performed by Dasha Mascorro MD at [facility] ENDOSCOPY    HX CHOLECYSTECTOMY  3966-02    HX OOPHORECTOMY  2003    right       ALLERGIES: Allergies   Allergen Reactions    Pcn [Penicillins] Anaphylaxis       FAMILY HISTORY:    Family History   Problem Relation Age of Onset    Hypertension Mother     Dementia Mother     Migraines Mother     Hypertension Father     Heart Disease Father        SOCIAL HISTORY:    Tobacco: Former smoker  Drugs: Not documented  ETOH: Not documented    HOME MEDICATIONS:    Prior to Admission Medications   Prescriptions Last Dose Informant Patient Reported? Taking? FLUoxetine (PROzac) 40 mg capsule Not Taking  Yes No   Sig: Take 40 mg by mouth daily. Patient not taking: Reported on 2022   SUMAtriptan-naproxen (TREXIMET)  mg tablet Not Taking  No No   Sig: Take one tablet at headache onset and repeat in 2 hours x1 prn   Patient not taking: Reported on 2022   adalimumab (HUMIRA) 40 mg/0.8 mL injection pen 2022  Yes Yes   Si mg by SubCUTAneous route Once every 2 weeks. atorvastatin (LIPITOR) 20 mg tablet 2022  Yes Yes   Sig: Take 40 mg by mouth daily. cetirizine (ZYRTEC) 10 mg tablet 2022  Yes Yes   Sig: Take 10 mg by mouth daily. hydroCHLOROthiazide (HYDRODIURIL) 25 mg tablet 2022  Yes Yes   Sig: Take 25 mg by mouth daily. multivitamin (ONE A DAY) tablet 2022  Yes Yes   Sig: Take 1 Tab by mouth daily. omeprazole (PRILOSEC) 40 mg capsule 2022  Yes Yes   Sig: Take 40 mg by mouth daily. pregabalin (LYRICA) 300 mg capsule Unknown  Yes No   Sig: Take 300 mg by mouth two (2) times a day. Patient not taking: Reported on 2022   zolpidem (AMBIEN) 10 mg tablet Not Taking  Yes No   Sig: Take  by mouth nightly as needed for Sleep. Patient not taking: Reported on 2022      Facility-Administered Medications: None       REVIEW OF SYSTEMS:  Complete review of systems performed, pertinents noted above, all other systems are negative.     Patient Vitals for the past 24 hrs:   Temp Pulse Resp BP SpO2   22 0827 98.1 °F (36.7 °C) (!) 102 24 115/65 97 % 09/02/22 0248 98.4 °F (36.9 °C) (!) 102 24 110/61 95 %   09/01/22 2310 98.3 °F (36.8 °C) (!) 103 23 (!) 110/54 95 %   09/01/22 2009 99.5 °F (37.5 °C) 93 22 (!) 107/58 95 %   09/01/22 1448 98.9 °F (37.2 °C) 91 18 (!) 96/57 94 %         PHYSICAL EXAMINATION:    General: Obese, NAD, A&O  HEENT: Normocephalic, PERRL, no drainage  Neck: Supple, Trachea midline, difficult to assess due to body habitus  RESP: CTA bilaterally. + Symmetrical chest movement. No SOB or distress. On RA  Cardiovascular: RRR no MRG  PVS: No rubor, cyanosis, no edema,  ABD: soft, NT, Normoactive BS  Derm: Warm/Dry/Intact with no lesions,  Neuro: A&O PPTS,  No focal deficits  PSYCH: No anxiety or agitation    Recent labs results and imaging reviewed.       Recent Results (from the past 24 hour(s))   GLUCOSE, POC    Collection Time: 09/01/22  5:06 PM   Result Value Ref Range    Glucose (POC) 107 (H) 65 - 100 mg/dL    Performed by Adi Porter (Float)    GLUCOSE, POC    Collection Time: 09/01/22  7:56 PM   Result Value Ref Range    Glucose (POC) 112 (H) 65 - 100 mg/dL    Performed by Daen James    CBC W/O DIFF    Collection Time: 09/02/22  7:51 AM   Result Value Ref Range    WBC 9.7 3.6 - 11.0 K/uL    RBC 3.41 (L) 3.80 - 5.20 M/uL    HGB 10.4 (L) 11.5 - 16.0 g/dL    HCT 31.4 (L) 35.0 - 47.0 %    MCV 92.1 80.0 - 99.0 FL    MCH 30.5 26.0 - 34.0 PG    MCHC 33.1 30.0 - 36.5 g/dL    RDW 13.0 11.5 - 14.5 %    PLATELET 393 368 - 257 K/uL    MPV 10.9 8.9 - 12.9 FL    NRBC 0.0 0.0  WBC    ABSOLUTE NRBC 0.00 0.00 - 5.49 K/uL   METABOLIC PANEL, COMPREHENSIVE    Collection Time: 09/02/22  7:51 AM   Result Value Ref Range    Sodium 132 (L) 136 - 145 mmol/L    Potassium 3.3 (L) 3.5 - 5.1 mmol/L    Chloride 96 (L) 97 - 108 mmol/L    CO2 29 21 - 32 mmol/L    Anion gap 7 5 - 15 mmol/L    Glucose 89 65 - 100 mg/dL    BUN 16 6 - 20 mg/dL    Creatinine 0.55 0.55 - 1.02 mg/dL    BUN/Creatinine ratio 29 (H) 12 - 20      GFR est AA >60 >60 ml/min/1.73m2 GFR est non-AA >60 >60 ml/min/1.73m2    Calcium 8.7 8.5 - 10.1 mg/dL    Bilirubin, total 0.6 0.2 - 1.0 mg/dL    AST (SGOT) 14 (L) 15 - 37 U/L    ALT (SGPT) 17 12 - 78 U/L    Alk. phosphatase 85 45 - 117 U/L    Protein, total 6.3 (L) 6.4 - 8.2 g/dL    Albumin 2.5 (L) 3.5 - 5.0 g/dL    Globulin 3.8 2.0 - 4.0 g/dL    A-G Ratio 0.7 (L) 1.1 - 2.2     GLUCOSE, POC    Collection Time: 09/02/22  8:31 AM   Result Value Ref Range    Glucose (POC) 162 (H) 65 - 100 mg/dL    Performed by Jennie Vazquez        XR Results (maximum last 3): Results from [place] encounter on 08/30/22    XR CHEST PORT    Impression  No acute process. Results from Hospital Encounter encounter on 04/09/21    XR SPINE CERV W OBL/FLEX/EXT MIN 6 V COMP    Impression  Mild spondylosis at C4-5 and C5-6. CT Results (maximum last 3): Results from [place] encounter on 08/30/22    CTA CHEST W OR W WO CONT    Impression  1. No evidence of pulmonary embolus. 2.  No acute thoracic pathology. Clear lungs. 3. Cardiomegaly.         Current Facility-Administered Medications:     traMADoL (ULTRAM) tablet 50 mg, 50 mg, Oral, Q6H PRN, Ant Cason MD, 50 mg at 09/02/22 0820    enoxaparin (LOVENOX) injection 30 mg, 30 mg, SubCUTAneous, Q12H, Ant Cason MD, 30 mg at 09/02/22 0847    atorvastatin (LIPITOR) tablet 40 mg, 40 mg, Oral, DAILY, Ant Cason MD, 40 mg at 09/02/22 0818    cetirizine (ZYRTEC) tablet 10 mg, 10 mg, Oral, DAILY, Ant Cason MD, 10 mg at 09/02/22 0818    FLUoxetine (PROzac) capsule 40 mg, 40 mg, Oral, DAILY, Ant Cason MD, 40 mg at 09/02/22 1152    hydroCHLOROthiazide (HYDRODIURIL) tablet 25 mg, 25 mg, Oral, DAILY, Ant Cason MD, 25 mg at 09/02/22 0818    therapeutic multivitamin [facility]) tablet 1 Tablet, 1 Tablet, Oral, DAILY, Ant Cason MD, 1 Tablet at 09/02/22 0818    pantoprazole (PROTONIX) tablet 40 mg, 40 mg, Oral, ACB, Ant Cason MD, 40 mg at 09/02/22 8627    pregabalin (LYRICA) capsule 300 mg, 300 mg, Oral, BID, Ant Cason MD, 300 mg at 09/02/22 0818    zolpidem (AMBIEN) tablet 5 mg, 5 mg, Oral, QHS PRN, Melissa Cason MD    insulin lispro (HUMALOG) injection, , SubCUTAneous, AC&HS, Ant Cason MD, 2 Units at 08/31/22 2147    glucose chewable tablet 16 g, 4 Tablet, Oral, PRN, Melissa Cason MD    glucagon (GLUCAGEN) injection 1 mg, 1 mg, IntraMUSCular, PRN, Melissa Cason MD    acetaminophen (TYLENOL) tablet 650 mg, 650 mg, Oral, Q6H PRN, 650 mg at 09/02/22 0820 **OR** acetaminophen (TYLENOL) suppository 650 mg, 650 mg, Rectal, Q6H PRN, Ant Cason MD    polyethylene glycol (MIRALAX) packet 17 g, 17 g, Oral, DAILY PRN, Melissa Cason MD    ondansetron (ZOFRAN ODT) tablet 4 mg, 4 mg, Oral, Q8H PRN **OR** ondansetron (ZOFRAN) injection 4 mg, 4 mg, IntraVENous, Q6H PRN, Ant Cason MD    aspirin tablet 325 mg, 325 mg, Oral, DAILY, Lavonne Frankel, MD, 325 mg at 09/02/22 0040    Case discussed with collaborating physician Dr. Sean Pelayo  and our impression and recommendations are as follows:     Chest pain:   - c/o sharp non-radiating chest pain, associated with nausea  - HS Troponins are peaked at 229, currently 79.  - Serial EKGs are nonischemic.   - Echocardiogram showed EF 60-65% with normal wall motion and function  - NMST is normal, no evidence of inducible ischemia, EF 70%  - Continue telemetry monitoring.   -Continue asa.     2. Hypertension:   - blood pressure at goal  - continue home medications: HCTZ 25 mg daily    3. Hyperlipidemia:   - continue statin therapy: atorvastatin 40 mg daily  - LFTs within normal limits    Patient is scheduled for a follow-up appointment on 09/07. Thank you for involving us in the care of this patient. Please do not hesitate to call if additional questions arise. If after hours please call [phone].        Dr. Deidre Ordaz [facility]  [address]    [address], [address] [facility]  [phone]

## 2022-09-02 NOTE — PROGRESS NOTES
Patient discharge paperwork given to patient. Patient verbalized understanding. Patient waiting for ride.  Primary nurse aware

## 2022-09-02 NOTE — PROGRESS NOTES
I have reviewed discharge instructions with the patient. The patient verbalized understanding. Discharge plan of care/case management plan validated with provider discharge order. Iv and tele removed. Patient awaiting transportation.

## 2022-09-07 LAB
BACTERIA SPEC CULT: NORMAL
SPECIAL REQUESTS,SREQ: NORMAL

## 2025-02-03 ENCOUNTER — APPOINTMENT (OUTPATIENT)
Facility: HOSPITAL | Age: 60
DRG: 690 | End: 2025-02-03
Payer: MEDICARE

## 2025-02-03 ENCOUNTER — HOSPITAL ENCOUNTER (INPATIENT)
Facility: HOSPITAL | Age: 60
LOS: 1 days | Discharge: HOME | DRG: 690 | End: 2025-02-05
Attending: EMERGENCY MEDICINE | Admitting: INTERNAL MEDICINE
Payer: MEDICARE

## 2025-02-03 DIAGNOSIS — R10.84 GENERALIZED ABDOMINAL PAIN: ICD-10-CM

## 2025-02-03 DIAGNOSIS — N39.0 URINARY TRACT INFECTION WITHOUT HEMATURIA, SITE UNSPECIFIED: Primary | ICD-10-CM

## 2025-02-03 DIAGNOSIS — E87.6 HYPOKALEMIA: ICD-10-CM

## 2025-02-03 DIAGNOSIS — G43.009 MIGRAINE WITHOUT AURA AND WITHOUT STATUS MIGRAINOSUS, NOT INTRACTABLE: ICD-10-CM

## 2025-02-03 LAB
ALBUMIN SERPL-MCNC: 3.4 G/DL (ref 3.5–5)
ALBUMIN/GLOB SERPL: 0.7 (ref 1.1–2.2)
ALP SERPL-CCNC: 102 U/L (ref 45–117)
ALT SERPL-CCNC: 22 U/L (ref 12–78)
ANION GAP SERPL CALC-SCNC: 7 MMOL/L (ref 2–12)
APPEARANCE UR: ABNORMAL
AST SERPL W P-5'-P-CCNC: 22 U/L (ref 15–37)
BACTERIA URNS QL MICRO: NEGATIVE /HPF
BASOPHILS # BLD: 0.02 K/UL (ref 0–0.1)
BASOPHILS NFR BLD: 0.3 % (ref 0–1)
BILIRUB SERPL-MCNC: 0.4 MG/DL (ref 0.2–1)
BILIRUB UR QL: NEGATIVE
BUN SERPL-MCNC: 14 MG/DL (ref 6–20)
BUN/CREAT SERPL: 12 (ref 12–20)
CA-I BLD-MCNC: 9.6 MG/DL (ref 8.5–10.1)
CHLORIDE SERPL-SCNC: 102 MMOL/L (ref 97–108)
CO2 SERPL-SCNC: 26 MMOL/L (ref 21–32)
COLOR UR: ABNORMAL
CREAT SERPL-MCNC: 1.16 MG/DL (ref 0.55–1.02)
DIFFERENTIAL METHOD BLD: ABNORMAL
EOSINOPHIL # BLD: 0.06 K/UL (ref 0–0.4)
EOSINOPHIL NFR BLD: 1 % (ref 0–7)
EPITH CASTS URNS QL MICRO: ABNORMAL /LPF
ERYTHROCYTE [DISTWIDTH] IN BLOOD BY AUTOMATED COUNT: 13 % (ref 11.5–14.5)
FLUAV RNA SPEC QL NAA+PROBE: NOT DETECTED
FLUBV RNA SPEC QL NAA+PROBE: NOT DETECTED
GLOBULIN SER CALC-MCNC: 5.1 G/DL (ref 2–4)
GLUCOSE SERPL-MCNC: 124 MG/DL (ref 65–100)
GLUCOSE UR STRIP.AUTO-MCNC: NEGATIVE MG/DL
HCT VFR BLD AUTO: 40 % (ref 35–47)
HGB BLD-MCNC: 13.1 G/DL (ref 11.5–16)
HGB UR QL STRIP: NEGATIVE
HYALINE CASTS URNS QL MICRO: ABNORMAL /LPF (ref 0–5)
IMM GRANULOCYTES # BLD AUTO: 0.02 K/UL (ref 0–0.04)
IMM GRANULOCYTES NFR BLD AUTO: 0.3 % (ref 0–0.5)
KETONES UR QL STRIP.AUTO: NEGATIVE MG/DL
LEUKOCYTE ESTERASE UR QL STRIP.AUTO: ABNORMAL
LIPASE SERPL-CCNC: 23 U/L (ref 13–75)
LYMPHOCYTES # BLD: 0.66 K/UL (ref 0.8–3.5)
LYMPHOCYTES NFR BLD: 11.1 % (ref 12–49)
MCH RBC QN AUTO: 28.2 PG (ref 26–34)
MCHC RBC AUTO-ENTMCNC: 32.8 G/DL (ref 30–36.5)
MCV RBC AUTO: 86 FL (ref 80–99)
MONOCYTES # BLD: 0.34 K/UL (ref 0–1)
MONOCYTES NFR BLD: 5.7 % (ref 5–13)
MUCOUS THREADS URNS QL MICRO: ABNORMAL /LPF
NEUTS SEG # BLD: 4.87 K/UL (ref 1.8–8)
NEUTS SEG NFR BLD: 81.6 % (ref 32–75)
NITRITE UR QL STRIP.AUTO: NEGATIVE
NRBC # BLD: 0 K/UL (ref 0–0.01)
NRBC BLD-RTO: 0 PER 100 WBC
PH UR STRIP: 6 (ref 5–8)
PLATELET # BLD AUTO: 207 K/UL (ref 150–400)
PMV BLD AUTO: 11.2 FL (ref 8.9–12.9)
POTASSIUM SERPL-SCNC: 2.8 MMOL/L (ref 3.5–5.1)
PROT SERPL-MCNC: 8.5 G/DL (ref 6.4–8.2)
PROT UR STRIP-MCNC: 100 MG/DL
RBC # BLD AUTO: 4.65 M/UL (ref 3.8–5.2)
RBC #/AREA URNS HPF: ABNORMAL /HPF (ref 0–5)
SARS-COV-2 RNA RESP QL NAA+PROBE: NOT DETECTED
SODIUM SERPL-SCNC: 135 MMOL/L (ref 136–145)
SP GR UR REFRACTOMETRY: 1.02 (ref 1–1.03)
URINE CULTURE IF INDICATED: ABNORMAL
UROBILINOGEN UR QL STRIP.AUTO: 2 EU/DL (ref 0.1–1)
WBC # BLD AUTO: 6 K/UL (ref 3.6–11)
WBC URNS QL MICRO: ABNORMAL /HPF (ref 0–4)

## 2025-02-03 PROCEDURE — 71045 X-RAY EXAM CHEST 1 VIEW: CPT

## 2025-02-03 PROCEDURE — 85025 COMPLETE CBC W/AUTO DIFF WBC: CPT

## 2025-02-03 PROCEDURE — 99285 EMERGENCY DEPT VISIT HI MDM: CPT

## 2025-02-03 PROCEDURE — 87154 CUL TYP ID BLD PTHGN 6+ TRGT: CPT

## 2025-02-03 PROCEDURE — 74177 CT ABD & PELVIS W/CONTRAST: CPT

## 2025-02-03 PROCEDURE — 87086 URINE CULTURE/COLONY COUNT: CPT

## 2025-02-03 PROCEDURE — 36415 COLL VENOUS BLD VENIPUNCTURE: CPT

## 2025-02-03 PROCEDURE — 81001 URINALYSIS AUTO W/SCOPE: CPT

## 2025-02-03 PROCEDURE — 2500000003 HC RX 250 WO HCPCS: Performed by: INTERNAL MEDICINE

## 2025-02-03 PROCEDURE — 6360000002 HC RX W HCPCS: Performed by: INTERNAL MEDICINE

## 2025-02-03 PROCEDURE — 2580000003 HC RX 258: Performed by: EMERGENCY MEDICINE

## 2025-02-03 PROCEDURE — 96365 THER/PROPH/DIAG IV INF INIT: CPT

## 2025-02-03 PROCEDURE — G0378 HOSPITAL OBSERVATION PER HR: HCPCS

## 2025-02-03 PROCEDURE — 96375 TX/PRO/DX INJ NEW DRUG ADDON: CPT

## 2025-02-03 PROCEDURE — 87636 SARSCOV2 & INF A&B AMP PRB: CPT

## 2025-02-03 PROCEDURE — 96361 HYDRATE IV INFUSION ADD-ON: CPT

## 2025-02-03 PROCEDURE — 6360000002 HC RX W HCPCS: Performed by: EMERGENCY MEDICINE

## 2025-02-03 PROCEDURE — 83690 ASSAY OF LIPASE: CPT

## 2025-02-03 PROCEDURE — 6360000004 HC RX CONTRAST MEDICATION: Performed by: EMERGENCY MEDICINE

## 2025-02-03 PROCEDURE — 87040 BLOOD CULTURE FOR BACTERIA: CPT

## 2025-02-03 PROCEDURE — 6370000000 HC RX 637 (ALT 250 FOR IP): Performed by: EMERGENCY MEDICINE

## 2025-02-03 PROCEDURE — 80053 COMPREHEN METABOLIC PANEL: CPT

## 2025-02-03 RX ORDER — SODIUM CHLORIDE 0.9 % (FLUSH) 0.9 %
5-40 SYRINGE (ML) INJECTION EVERY 12 HOURS SCHEDULED
Status: DISCONTINUED | OUTPATIENT
Start: 2025-02-03 | End: 2025-02-05 | Stop reason: HOSPADM

## 2025-02-03 RX ORDER — ENOXAPARIN SODIUM 100 MG/ML
30 INJECTION SUBCUTANEOUS 2 TIMES DAILY
Status: DISCONTINUED | OUTPATIENT
Start: 2025-02-03 | End: 2025-02-05 | Stop reason: HOSPADM

## 2025-02-03 RX ORDER — SODIUM CHLORIDE 0.9 % (FLUSH) 0.9 %
5-40 SYRINGE (ML) INJECTION PRN
Status: DISCONTINUED | OUTPATIENT
Start: 2025-02-03 | End: 2025-02-05 | Stop reason: HOSPADM

## 2025-02-03 RX ORDER — ONDANSETRON 2 MG/ML
4 INJECTION INTRAMUSCULAR; INTRAVENOUS ONCE
Status: COMPLETED | OUTPATIENT
Start: 2025-02-03 | End: 2025-02-03

## 2025-02-03 RX ORDER — TRAZODONE HYDROCHLORIDE 100 MG/1
100 TABLET ORAL NIGHTLY
COMMUNITY

## 2025-02-03 RX ORDER — POTASSIUM CHLORIDE 1500 MG/1
40 TABLET, EXTENDED RELEASE ORAL PRN
Status: DISCONTINUED | OUTPATIENT
Start: 2025-02-03 | End: 2025-02-05 | Stop reason: HOSPADM

## 2025-02-03 RX ORDER — SODIUM CHLORIDE 9 MG/ML
INJECTION, SOLUTION INTRAVENOUS PRN
Status: DISCONTINUED | OUTPATIENT
Start: 2025-02-03 | End: 2025-02-05 | Stop reason: HOSPADM

## 2025-02-03 RX ORDER — M-VIT,TX,IRON,MINS/CALC/FOLIC 27MG-0.4MG
1 TABLET ORAL DAILY
COMMUNITY

## 2025-02-03 RX ORDER — FLUOXETINE 10 MG/1
40 CAPSULE ORAL DAILY
Status: DISCONTINUED | OUTPATIENT
Start: 2025-02-03 | End: 2025-02-05 | Stop reason: HOSPADM

## 2025-02-03 RX ORDER — ATORVASTATIN CALCIUM 40 MG/1
40 TABLET, FILM COATED ORAL NIGHTLY
Status: DISCONTINUED | OUTPATIENT
Start: 2025-02-03 | End: 2025-02-05 | Stop reason: HOSPADM

## 2025-02-03 RX ORDER — POLYETHYLENE GLYCOL 3350 17 G/17G
17 POWDER, FOR SOLUTION ORAL DAILY PRN
Status: DISCONTINUED | OUTPATIENT
Start: 2025-02-03 | End: 2025-02-05 | Stop reason: HOSPADM

## 2025-02-03 RX ORDER — ONDANSETRON 2 MG/ML
4 INJECTION INTRAMUSCULAR; INTRAVENOUS EVERY 6 HOURS PRN
Status: DISCONTINUED | OUTPATIENT
Start: 2025-02-03 | End: 2025-02-05 | Stop reason: HOSPADM

## 2025-02-03 RX ORDER — POTASSIUM CHLORIDE 7.45 MG/ML
10 INJECTION INTRAVENOUS PRN
Status: DISCONTINUED | OUTPATIENT
Start: 2025-02-03 | End: 2025-02-05 | Stop reason: HOSPADM

## 2025-02-03 RX ORDER — CIPROFLOXACIN 2 MG/ML
400 INJECTION, SOLUTION INTRAVENOUS ONCE
Status: COMPLETED | OUTPATIENT
Start: 2025-02-03 | End: 2025-02-03

## 2025-02-03 RX ORDER — HYDROCHLOROTHIAZIDE 25 MG/1
25 TABLET ORAL DAILY
Status: DISCONTINUED | OUTPATIENT
Start: 2025-02-03 | End: 2025-02-05 | Stop reason: HOSPADM

## 2025-02-03 RX ORDER — IOPAMIDOL 755 MG/ML
100 INJECTION, SOLUTION INTRAVASCULAR
Status: COMPLETED | OUTPATIENT
Start: 2025-02-03 | End: 2025-02-03

## 2025-02-03 RX ORDER — POTASSIUM CHLORIDE 750 MG/1
40 TABLET, EXTENDED RELEASE ORAL ONCE
Status: COMPLETED | OUTPATIENT
Start: 2025-02-03 | End: 2025-02-03

## 2025-02-03 RX ORDER — CETIRIZINE HYDROCHLORIDE 10 MG/1
10 TABLET ORAL DAILY
Status: DISCONTINUED | OUTPATIENT
Start: 2025-02-03 | End: 2025-02-05 | Stop reason: HOSPADM

## 2025-02-03 RX ORDER — MAGNESIUM SULFATE IN WATER 40 MG/ML
2000 INJECTION, SOLUTION INTRAVENOUS PRN
Status: DISCONTINUED | OUTPATIENT
Start: 2025-02-03 | End: 2025-02-05 | Stop reason: HOSPADM

## 2025-02-03 RX ORDER — MULTIVIT-MIN/IRON/FOLIC ACID/K 18-600-40
2000 CAPSULE ORAL DAILY
COMMUNITY

## 2025-02-03 RX ORDER — 0.9 % SODIUM CHLORIDE 0.9 %
1000 INTRAVENOUS SOLUTION INTRAVENOUS ONCE
Status: COMPLETED | OUTPATIENT
Start: 2025-02-03 | End: 2025-02-03

## 2025-02-03 RX ORDER — ASPIRIN 325 MG
325 TABLET ORAL DAILY
Status: DISCONTINUED | OUTPATIENT
Start: 2025-02-03 | End: 2025-02-05 | Stop reason: HOSPADM

## 2025-02-03 RX ORDER — PANTOPRAZOLE SODIUM 40 MG/1
40 TABLET, DELAYED RELEASE ORAL
Status: DISCONTINUED | OUTPATIENT
Start: 2025-02-04 | End: 2025-02-04

## 2025-02-03 RX ORDER — FERROUS SULFATE 325(65) MG
325 TABLET ORAL
COMMUNITY

## 2025-02-03 RX ORDER — MORPHINE SULFATE 4 MG/ML
4 INJECTION, SOLUTION INTRAMUSCULAR; INTRAVENOUS
Status: COMPLETED | OUTPATIENT
Start: 2025-02-03 | End: 2025-02-03

## 2025-02-03 RX ORDER — ONDANSETRON 4 MG/1
4 TABLET, ORALLY DISINTEGRATING ORAL EVERY 8 HOURS PRN
Status: DISCONTINUED | OUTPATIENT
Start: 2025-02-03 | End: 2025-02-05 | Stop reason: HOSPADM

## 2025-02-03 RX ORDER — ACETAMINOPHEN 650 MG/1
650 SUPPOSITORY RECTAL EVERY 6 HOURS PRN
Status: DISCONTINUED | OUTPATIENT
Start: 2025-02-03 | End: 2025-02-05 | Stop reason: HOSPADM

## 2025-02-03 RX ORDER — LEVOFLOXACIN 5 MG/ML
750 INJECTION, SOLUTION INTRAVENOUS
Status: DISCONTINUED | OUTPATIENT
Start: 2025-02-03 | End: 2025-02-03 | Stop reason: ALTCHOICE

## 2025-02-03 RX ORDER — CIPROFLOXACIN 2 MG/ML
400 INJECTION, SOLUTION INTRAVENOUS EVERY 12 HOURS
Status: DISCONTINUED | OUTPATIENT
Start: 2025-02-03 | End: 2025-02-05

## 2025-02-03 RX ORDER — METOPROLOL TARTRATE 50 MG
50 TABLET ORAL DAILY
COMMUNITY

## 2025-02-03 RX ORDER — ACETAMINOPHEN 325 MG/1
650 TABLET ORAL EVERY 6 HOURS PRN
Status: DISCONTINUED | OUTPATIENT
Start: 2025-02-03 | End: 2025-02-05 | Stop reason: HOSPADM

## 2025-02-03 RX ADMIN — SODIUM CHLORIDE 1000 ML: 9 INJECTION, SOLUTION INTRAVENOUS at 15:35

## 2025-02-03 RX ADMIN — SODIUM CHLORIDE, PRESERVATIVE FREE 10 ML: 5 INJECTION INTRAVENOUS at 22:34

## 2025-02-03 RX ADMIN — ONDANSETRON 4 MG: 2 INJECTION INTRAMUSCULAR; INTRAVENOUS at 15:35

## 2025-02-03 RX ADMIN — ENOXAPARIN SODIUM 30 MG: 100 INJECTION SUBCUTANEOUS at 22:34

## 2025-02-03 RX ADMIN — IOPAMIDOL 100 ML: 755 INJECTION, SOLUTION INTRAVENOUS at 16:57

## 2025-02-03 RX ADMIN — CIPROFLOXACIN 400 MG: 2 INJECTION, SOLUTION INTRAVENOUS at 22:54

## 2025-02-03 RX ADMIN — POTASSIUM CHLORIDE 40 MEQ: 750 TABLET, EXTENDED RELEASE ORAL at 16:43

## 2025-02-03 RX ADMIN — CIPROFLOXACIN 400 MG: 400 INJECTION, SOLUTION INTRAVENOUS at 17:10

## 2025-02-03 RX ADMIN — MORPHINE SULFATE 4 MG: 4 INJECTION, SOLUTION INTRAMUSCULAR; INTRAVENOUS at 15:35

## 2025-02-03 ASSESSMENT — PAIN - FUNCTIONAL ASSESSMENT
PAIN_FUNCTIONAL_ASSESSMENT: 0-10
PAIN_FUNCTIONAL_ASSESSMENT: ACTIVITIES ARE NOT PREVENTED

## 2025-02-03 ASSESSMENT — LIFESTYLE VARIABLES
HOW MANY STANDARD DRINKS CONTAINING ALCOHOL DO YOU HAVE ON A TYPICAL DAY: PATIENT DOES NOT DRINK
HOW OFTEN DO YOU HAVE A DRINK CONTAINING ALCOHOL: NEVER

## 2025-02-03 ASSESSMENT — PAIN DESCRIPTION - LOCATION: LOCATION: ABDOMEN

## 2025-02-03 ASSESSMENT — PAIN SCALES - GENERAL
PAINLEVEL_OUTOF10: 5
PAINLEVEL_OUTOF10: 6
PAINLEVEL_OUTOF10: 3

## 2025-02-03 ASSESSMENT — PAIN DESCRIPTION - ONSET: ONSET: GRADUAL

## 2025-02-03 ASSESSMENT — PAIN DESCRIPTION - PAIN TYPE: TYPE: ACUTE PAIN

## 2025-02-03 ASSESSMENT — PAIN DESCRIPTION - FREQUENCY: FREQUENCY: CONTINUOUS

## 2025-02-03 ASSESSMENT — PAIN DESCRIPTION - DESCRIPTORS: DESCRIPTORS: CRAMPING;SHARP

## 2025-02-03 ASSESSMENT — PAIN DESCRIPTION - ORIENTATION: ORIENTATION: RIGHT;LEFT;LOWER

## 2025-02-03 NOTE — ED PROVIDER NOTES
[facility] EMERGENCY DEPT  EMERGENCY DEPARTMENT HISTORY AND PHYSICAL EXAM      Date: 2/3/2025  Patient Name: Dayan Lyons  MRN: [id number]  Birthdate 1965  Date of evaluation: 2/3/2025  Provider: Danielle Corona MD   Note Started: 3:45 PM EST 2/3/25    HISTORY OF PRESENT ILLNESS     Chief Complaint   Patient presents with    Abdominal Pain       History Provided By: Patient    HPI: Dayan Lyons is a 59 y.o. female patient presents with severe abdominal pain history of Crohn's disease pain is in the lower abdomen suprapubic left lower quadrant.    PAST MEDICAL HISTORY   Past Medical History:  Past Medical History:   Diagnosis Date    Anemia, unspecified 2011    Autoimmune disease (HCC)     fibromyalgia    Coagulation defects     anemia    Crohn's disease (HCC)     Depression     Fibromyalgia     GERD (gastroesophageal reflux disease)     Hypercholesterolemia     Hypertension     Musculoskeletal disorder     Other ill-defined conditions(799.89)     crohns    Psychiatric disorder     depression    Unspecified sleep apnea     cpap       Past Surgical History:  Past Surgical History:   Procedure Laterality Date    CHOLECYSTECTOMY  -    COLONOSCOPY N/A 2020    COLONOSCOPY performed by Bandar De Santiago MD at [facility] ENDOSCOPY    OVARY REMOVAL  2003    right       Family History:  Family History   Problem Relation Age of Onset    Dementia Mother     Hypertension Mother     Heart Disease Father     Hypertension Father     Migraines Mother        Social History:  Social History     Tobacco Use    Smoking status: Former     Current packs/day: 0.00     Types: Cigarettes     Quit date: 1998     Years since quittin.4    Smokeless tobacco: Never   Substance Use Topics    Alcohol use: Yes    Drug use: No       Allergies:  Allergies   Allergen Reactions    Penicillins Anaphylaxis       PCP: Eliza Kirkpatrick APRN - NP    Current Meds:   Current Facility-Administered Medications   Medication Dose Route Frequency

## 2025-02-03 NOTE — ED NOTES
ED TO INPATIENT SBAR HANDOFF    Patient Name: Dayan Lyons   Preferred Name: Dayan  : 1965  59 y.o.   Family/Caregiver Present: no   Code Status Order: No Order  PO Status: NPO:NO  Telemetry Order: Yes  C-SSRS: Risk of Suicide: No Risk  Sitter no     Restraints:     Sepsis Risk Score      Situation  Chief Complaint   Patient presents with    Abdominal Pain     Brief Description of Patient's Condition: C/o abd pain and fever through the weekend. Pt has a UTI      Hx Crohns  Mental Status: oriented, alert, coherent, logical, thought processes intact, and able to concentrate and follow conversation  Arrived from:Home  Imaging:   CT ABDOMEN PELVIS W IV CONTRAST Additional Contrast? None   Final Result   Imaging findings suggestive of a minimal infectious/inflammatory colitis   predominantly affecting the sigmoid colon.       Incidental and/or nonemergent findings are as described above.             Electronically signed by ZAC PICKETT      XR CHEST PORTABLE   Final Result   No acute process.      Electronically signed by Kaiden Lazo        Abnormal labs:   Abnormal Labs Reviewed   CBC WITH AUTO DIFFERENTIAL - Abnormal; Notable for the following components:       Result Value    Neutrophils % 81.6 (*)     Lymphocytes % 11.1 (*)     Lymphocytes Absolute 0.66 (*)     All other components within normal limits   COMPREHENSIVE METABOLIC PANEL - Abnormal; Notable for the following components:    Sodium 135 (*)     Potassium 2.8 (*)     Glucose 124 (*)     Creatinine 1.16 (*)     Est, Glom Filt Rate 55 (*)     Total Protein 8.5 (*)     Albumin 3.4 (*)     Globulin 5.1 (*)     Albumin/Globulin Ratio 0.7 (*)     All other components within normal limits   URINALYSIS WITH REFLEX TO CULTURE - Abnormal; Notable for the following components:    Appearance Turbid (*)     Protein,  (*)     Urobilinogen, Urine 2.0 (*)     Leukocyte Esterase, Urine Large (*)     Epithelial Cells, UA Many (*)     Urine Culture if Indicated

## 2025-02-04 PROBLEM — N39.0 UTI (URINARY TRACT INFECTION): Status: ACTIVE | Noted: 2025-02-04

## 2025-02-04 LAB
ACCESSION NUMBER, LLC1M: ABNORMAL
ACINETOBACTER CALCOAC BAUMANNII COMPLEX BY PCR: NOT DETECTED
ANION GAP SERPL CALC-SCNC: 4 MMOL/L (ref 2–12)
BACTEROIDES FRAGILIS BY PCR: NOT DETECTED
BASOPHILS # BLD: 0.02 K/UL (ref 0–0.1)
BASOPHILS NFR BLD: 0.4 % (ref 0–1)
BIOFIRE TEST COMMENT: ABNORMAL
BUN SERPL-MCNC: 10 MG/DL (ref 6–20)
BUN/CREAT SERPL: 10 (ref 12–20)
CA-I BLD-MCNC: 9.2 MG/DL (ref 8.5–10.1)
CANDIDA ALBICANS BY PCR: NOT DETECTED
CANDIDA AURIS BY PCR: NOT DETECTED
CANDIDA GLABRATA: NOT DETECTED
CANDIDA KRUSEI BY PCR: NOT DETECTED
CANDIDA PARAPSILOSIS BY PCR: NOT DETECTED
CANDIDA TROPICALIS BY PCR: NOT DETECTED
CHLORIDE SERPL-SCNC: 103 MMOL/L (ref 97–108)
CHLORIDE UR-SCNC: 51 MMOL/L
CO2 SERPL-SCNC: 29 MMOL/L (ref 21–32)
CREAT SERPL-MCNC: 0.98 MG/DL (ref 0.55–1.02)
CREAT UR-MCNC: 133 MG/DL
CRYPTOCOCCUS NEOFORMANS/GATTII BY PCR: NOT DETECTED
DIFFERENTIAL METHOD BLD: NORMAL
ENTEROBACTER CLOACAE COMPLEX BY PCR: NOT DETECTED
ENTEROBACTERALES BY PCR: NOT DETECTED
ENTEROCOCCUS FAECALIS BY PCR: NOT DETECTED
ENTEROCOCCUS FAECIUM BY PCR: NOT DETECTED
EOSINOPHIL # BLD: 0.19 K/UL (ref 0–0.4)
EOSINOPHIL NFR BLD: 3.5 % (ref 0–7)
ERYTHROCYTE [DISTWIDTH] IN BLOOD BY AUTOMATED COUNT: 13.1 % (ref 11.5–14.5)
ESCHERICHIA COLI: NOT DETECTED
GLUCOSE SERPL-MCNC: 101 MG/DL (ref 65–100)
HAEM INFLU DNA BLD POS QL NAA+NON-PROBE: NOT DETECTED
HCT VFR BLD AUTO: 37.1 % (ref 35–47)
HGB BLD-MCNC: 11.7 G/DL (ref 11.5–16)
IMM GRANULOCYTES # BLD AUTO: 0.02 K/UL (ref 0–0.04)
IMM GRANULOCYTES NFR BLD AUTO: 0.4 % (ref 0–0.5)
KLEBSIELLA AEROGENES BY PCR: NOT DETECTED
KLEBSIELLA OXYTOCA BY PCR: NOT DETECTED
KLEBSIELLA PNEUMONIAE GROUP BY PCR: NOT DETECTED
LISTERIA MONOCYTOGENES BY PCR: NOT DETECTED
LYMPHOCYTES # BLD: 1.2 K/UL (ref 0.8–3.5)
LYMPHOCYTES NFR BLD: 22.1 % (ref 12–49)
MAGNESIUM SERPL-MCNC: 2.2 MG/DL (ref 1.6–2.4)
MCH RBC QN AUTO: 28.1 PG (ref 26–34)
MCHC RBC AUTO-ENTMCNC: 31.5 G/DL (ref 30–36.5)
MCV RBC AUTO: 89 FL (ref 80–99)
MECA+MECC ISLT/SPM QL: DETECTED
MONOCYTES # BLD: 0.35 K/UL (ref 0–1)
MONOCYTES NFR BLD: 6.5 % (ref 5–13)
NEISSERIA MENINGITIDIS BY PCR: NOT DETECTED
NEUTS SEG # BLD: 3.64 K/UL (ref 1.8–8)
NEUTS SEG NFR BLD: 67.1 % (ref 32–75)
NRBC # BLD: 0 K/UL (ref 0–0.01)
NRBC BLD-RTO: 0 PER 100 WBC
PHOSPHATE SERPL-MCNC: 3.6 MG/DL (ref 2.6–4.7)
PLATELET # BLD AUTO: 190 K/UL (ref 150–400)
PMV BLD AUTO: 11.6 FL (ref 8.9–12.9)
POTASSIUM SERPL-SCNC: 3.2 MMOL/L (ref 3.5–5.1)
PROTEUS BY PCR: NOT DETECTED
PSEUDOMONAS AERUGINOSA, PSAEP: NOT DETECTED
RBC # BLD AUTO: 4.17 M/UL (ref 3.8–5.2)
RESISTANT GENE TARGETS: ABNORMAL
SALMONELLA DNA BLD POS QL NAA+NON-PROBE: NOT DETECTED
SERRATIA MARCESCENS BY PCR: NOT DETECTED
SODIUM SERPL-SCNC: 136 MMOL/L (ref 136–145)
SODIUM UR-SCNC: 15 MMOL/L
STAPHYLOCOCCUS AUREUS: NOT DETECTED
STAPHYLOCOCCUS EPIDERMIDIS BY PCR: DETECTED
STAPHYLOCOCCUS LUGDUNENSIS BY PCR: NOT DETECTED
STAPHYLOCOCCUS: DETECTED
STENOTROPHOMONAS MALTOPHILIA BY PCR: NOT DETECTED
STREPTOCOCCUS AGALACTIAE (GROUP B): NOT DETECTED
STREPTOCOCCUS PNEUMONIAE , SPNP: NOT DETECTED
STREPTOCOCCUS PYOGENES (GROUP A), SPYOP: NOT DETECTED
STREPTOCOCCUS: NOT DETECTED
UUN UR-MCNC: 401 MG/DL
WBC # BLD AUTO: 5.4 K/UL (ref 3.6–11)

## 2025-02-04 PROCEDURE — 6360000002 HC RX W HCPCS: Performed by: PHYSICIAN ASSISTANT

## 2025-02-04 PROCEDURE — 6370000000 HC RX 637 (ALT 250 FOR IP): Performed by: INTERNAL MEDICINE

## 2025-02-04 PROCEDURE — 84300 ASSAY OF URINE SODIUM: CPT

## 2025-02-04 PROCEDURE — 1100000000 HC RM PRIVATE

## 2025-02-04 PROCEDURE — 6360000002 HC RX W HCPCS: Performed by: INTERNAL MEDICINE

## 2025-02-04 PROCEDURE — 82436 ASSAY OF URINE CHLORIDE: CPT

## 2025-02-04 PROCEDURE — 2500000003 HC RX 250 WO HCPCS: Performed by: INTERNAL MEDICINE

## 2025-02-04 PROCEDURE — 84540 ASSAY OF URINE/UREA-N: CPT

## 2025-02-04 PROCEDURE — 36415 COLL VENOUS BLD VENIPUNCTURE: CPT

## 2025-02-04 PROCEDURE — 85025 COMPLETE CBC W/AUTO DIFF WBC: CPT

## 2025-02-04 PROCEDURE — 82570 ASSAY OF URINE CREATININE: CPT

## 2025-02-04 PROCEDURE — 84100 ASSAY OF PHOSPHORUS: CPT

## 2025-02-04 PROCEDURE — 83735 ASSAY OF MAGNESIUM: CPT

## 2025-02-04 PROCEDURE — G0378 HOSPITAL OBSERVATION PER HR: HCPCS

## 2025-02-04 PROCEDURE — 80048 BASIC METABOLIC PNL TOTAL CA: CPT

## 2025-02-04 RX ORDER — MULTIVITAMIN WITH IRON
1 TABLET ORAL DAILY
Status: DISCONTINUED | OUTPATIENT
Start: 2025-02-04 | End: 2025-02-05 | Stop reason: HOSPADM

## 2025-02-04 RX ORDER — DICYCLOMINE HYDROCHLORIDE 10 MG/ML
20 INJECTION INTRAMUSCULAR 4 TIMES DAILY PRN
Status: DISCONTINUED | OUTPATIENT
Start: 2025-02-04 | End: 2025-02-04

## 2025-02-04 RX ORDER — METOPROLOL TARTRATE 50 MG
50 TABLET ORAL DAILY
Status: DISCONTINUED | OUTPATIENT
Start: 2025-02-04 | End: 2025-02-05 | Stop reason: HOSPADM

## 2025-02-04 RX ORDER — VITAMIN B COMPLEX
2000 TABLET ORAL DAILY
Status: DISCONTINUED | OUTPATIENT
Start: 2025-02-04 | End: 2025-02-05 | Stop reason: HOSPADM

## 2025-02-04 RX ORDER — DICYCLOMINE HCL 20 MG
20 TABLET ORAL 4 TIMES DAILY PRN
Status: DISCONTINUED | OUTPATIENT
Start: 2025-02-04 | End: 2025-02-05 | Stop reason: HOSPADM

## 2025-02-04 RX ORDER — FERROUS SULFATE 325(65) MG
325 TABLET ORAL
Status: DISCONTINUED | OUTPATIENT
Start: 2025-02-04 | End: 2025-02-05 | Stop reason: HOSPADM

## 2025-02-04 RX ORDER — TRAZODONE HYDROCHLORIDE 50 MG/1
100 TABLET, FILM COATED ORAL NIGHTLY
Status: DISCONTINUED | OUTPATIENT
Start: 2025-02-04 | End: 2025-02-05 | Stop reason: HOSPADM

## 2025-02-04 RX ADMIN — HYDROMORPHONE HYDROCHLORIDE 0.5 MG: 0.5 INJECTION, SOLUTION INTRAMUSCULAR; INTRAVENOUS; SUBCUTANEOUS at 03:25

## 2025-02-04 RX ADMIN — ENOXAPARIN SODIUM 30 MG: 100 INJECTION SUBCUTANEOUS at 09:52

## 2025-02-04 RX ADMIN — DICYCLOMINE HYDROCHLORIDE 20 MG: 20 TABLET ORAL at 10:47

## 2025-02-04 RX ADMIN — CETIRIZINE HYDROCHLORIDE 10 MG: 10 TABLET, FILM COATED ORAL at 09:50

## 2025-02-04 RX ADMIN — CIPROFLOXACIN 400 MG: 2 INJECTION, SOLUTION INTRAVENOUS at 22:07

## 2025-02-04 RX ADMIN — ATORVASTATIN CALCIUM 40 MG: 40 TABLET, FILM COATED ORAL at 22:05

## 2025-02-04 RX ADMIN — ASPIRIN 325 MG: 325 TABLET ORAL at 09:50

## 2025-02-04 RX ADMIN — TRAZODONE HYDROCHLORIDE 100 MG: 50 TABLET ORAL at 22:05

## 2025-02-04 RX ADMIN — POTASSIUM BICARBONATE 40 MEQ: 782 TABLET, EFFERVESCENT ORAL at 15:19

## 2025-02-04 RX ADMIN — HYDROMORPHONE HYDROCHLORIDE 0.5 MG: 0.5 INJECTION, SOLUTION INTRAMUSCULAR; INTRAVENOUS; SUBCUTANEOUS at 15:20

## 2025-02-04 RX ADMIN — Medication 2000 UNITS: at 09:49

## 2025-02-04 RX ADMIN — ACETAMINOPHEN 650 MG: 325 TABLET ORAL at 09:52

## 2025-02-04 RX ADMIN — FERROUS SULFATE TAB 325 MG (65 MG ELEMENTAL FE) 325 MG: 325 (65 FE) TAB at 10:50

## 2025-02-04 RX ADMIN — PANTOPRAZOLE SODIUM 40 MG: 40 TABLET, DELAYED RELEASE ORAL at 06:54

## 2025-02-04 RX ADMIN — FLUOXETINE HYDROCHLORIDE 40 MG: 10 CAPSULE ORAL at 09:49

## 2025-02-04 RX ADMIN — SODIUM CHLORIDE, PRESERVATIVE FREE 10 ML: 5 INJECTION INTRAVENOUS at 22:05

## 2025-02-04 RX ADMIN — DICYCLOMINE HYDROCHLORIDE 20 MG: 20 TABLET ORAL at 16:40

## 2025-02-04 RX ADMIN — CIPROFLOXACIN 400 MG: 2 INJECTION, SOLUTION INTRAVENOUS at 09:58

## 2025-02-04 RX ADMIN — POTASSIUM BICARBONATE 40 MEQ: 782 TABLET, EFFERVESCENT ORAL at 10:47

## 2025-02-04 RX ADMIN — THERA TABS 1 TABLET: TAB at 09:49

## 2025-02-04 RX ADMIN — ENOXAPARIN SODIUM 30 MG: 100 INJECTION SUBCUTANEOUS at 22:05

## 2025-02-04 ASSESSMENT — PAIN DESCRIPTION - LOCATION
LOCATION: ABDOMEN
LOCATION: HEAD
LOCATION: ABDOMEN
LOCATION: ABDOMEN

## 2025-02-04 ASSESSMENT — PAIN DESCRIPTION - DESCRIPTORS
DESCRIPTORS: CRAMPING
DESCRIPTORS: ACHING
DESCRIPTORS: CRAMPING
DESCRIPTORS: ACHING;SHARP

## 2025-02-04 ASSESSMENT — PAIN SCALES - GENERAL
PAINLEVEL_OUTOF10: 0
PAINLEVEL_OUTOF10: 0
PAINLEVEL_OUTOF10: 3
PAINLEVEL_OUTOF10: 7
PAINLEVEL_OUTOF10: 3
PAINLEVEL_OUTOF10: 7
PAINLEVEL_OUTOF10: 3

## 2025-02-04 ASSESSMENT — PAIN DESCRIPTION - ORIENTATION
ORIENTATION: RIGHT;LEFT;LOWER
ORIENTATION: LEFT;UPPER

## 2025-02-04 ASSESSMENT — PAIN - FUNCTIONAL ASSESSMENT: PAIN_FUNCTIONAL_ASSESSMENT: ACTIVITIES ARE NOT PREVENTED

## 2025-02-04 NOTE — PROGRESS NOTES
4 Eyes Skin Assessment     NAME:  Dayan Lyons  YOB: 1965  MEDICAL RECORD NUMBER:  [id number]    The patient is being assessed for  Admission    I agree that at least one RN has performed a thorough Head to Toe Skin Assessment on the patient. ALL assessment sites listed below have been assessed.      Areas assessed by both nurses:    Head, Face, Ears, Shoulders, Back, Chest, Arms, Elbows, Hands, Sacrum. Buttock, Coccyx, Ischium, Legs. Feet and Heels, and Under Medical Devices         Does the Patient have a Wound? No noted wound(s)       Sage Prevention initiated by RN: Yes  Wound Care Orders initiated by RN: No    Pressure Injury (Stage 3,4, Unstageable, DTI, NWPT, and Complex wounds) if present, place Wound referral order by RN under : No    New Ostomies, if present place, Ostomy referral order under : No     Nurse 1 eSignature: Electronically signed by Cheryl Styles RN on 2/4/25 at 7:46 AM EST    **SHARE this note so that the co-signing nurse can place an eSignature**    Nurse 2 eSignature: Electronically signed by René Bermeo RN on 2/4/25 at 7:55 AM EST

## 2025-02-04 NOTE — H&P
History & Physical    Primary Care Provider: Eliza Kirkpatrick APRN - NP  Source of Information: Patient/family     Chief complaint:   Chief Complaint   Patient presents with    Abdominal Pain        History of Presenting Illness:   Dayan Lyons is a 59 y.o. female with multiple medical problems including Crohn's disease, fibromyalgia, hypertension who presents to the hospital with a 2 to 3-week history of intermittent abdominal pain, left lower quadrant, into the back with associated intermittent nausea but no vomiting.  She admits to low-grade fevers.  She otherwise denies any urinary symptoms, specifically urinary frequency, dysuria or urgency.  Workup in the ER included CT abdomen pelvis which showed minimal infectious/inflammatory colitis, mainly in sigmoid colon, as well as a urinary tract infection.    Review of systems:    Patient otherwise denies any headache, dizziness, chest pain, shortness of breath.  She ambulates independently, sometimes with a cane.       Review of Systems:  A comprehensive review of systems was negative except for that written in the History of Present Illness.     Past Medical History:   Diagnosis Date    Anemia, unspecified 8/20/2011    Autoimmune disease (HCC)     fibromyalgia    Coagulation defects     anemia    Crohn's disease (HCC)     Depression     Fibromyalgia     GERD (gastroesophageal reflux disease)     Hypercholesterolemia     Hypertension     Musculoskeletal disorder     Other ill-defined conditions(799.89)     crohns    Psychiatric disorder     depression    Unspecified sleep apnea     cpap        Past Surgical History:   Procedure Laterality Date    CHOLECYSTECTOMY  1994-95    COLONOSCOPY N/A 8/24/2020    COLONOSCOPY performed by Bandar De Santiago MD at [facility] ENDOSCOPY    OVARY REMOVAL  2003    right       Prior to Admission medications    Medication Sig Start Date End Date Taking? Authorizing Provider   aspirin 325 MG tablet Take 325 mg by mouth daily 9/3/22   Automatic

## 2025-02-04 NOTE — CONSULTS
patient in the interest of transparency, however please be advised this is a medical document.  It is intended  as peer to peer communication. It is written in the medical language and may contain abbreviation or verbiage that are unfamiliar. It may appear blunt or direct.  Medical documents are intended to carry relevant information, facts as evident.  And the clinic opinions of the practitioner.  This note maybe transcribed  using a voice dictation system, voice-recognition errors may occur, this should not be taken to alter the contents or meaning for this note.      Cc Referring Physician

## 2025-02-04 NOTE — PROGRESS NOTES
Hospitalist Progress Note    NAME: Dayan Lyons   :  1965   MRN:  [id number]            Subjective:     Chief Complaint / Reason for Physician Visit Abdominal pain  Patient seen and evaluated at bedside, overnight events reviewed, patient currently has no new complaints.  Discussed with RN events overnight.     Review of Systems:  Symptom Y/N Comments  Symptom Y/N Comments   Fever/Chills N   Chest Pain N    Poor Appetite Y   Edema N    Cough N   Abdominal Pain Y    Sputum N   Joint Pain N    SOB/WALKER N   Pruritis/Rash N    Nausea/vomit N   Tolerating PT/OT NA    Diarrhea Y   Tolerating Diet Y    Constipation Y   Other       Could NOT obtain due to:      Objective:     VITALS:   Last 24hrs VS reviewed since prior progress note. Most recent are:  [unfilled]    Intake/Output Summary (Last 24 hours) at 2025 1030  Last data filed at 2/3/2025 1835  Gross per 24 hour   Intake 1199.05 ml   Output --   Net 1199.05 ml        PHYSICAL EXAM:  General: Patient seen and evaluated at bedside, overnight events reviewed, patient currently has no new complaints  EENT:  EOMI. Anicteric sclerae. MMM  Resp:  CTA bilaterally, no wheezing or rales.  No accessory muscle use  CV:  Regular  rhythm, s1/s2 no m/r/g No edema  GI:  Soft, Non distended, Non tender.  +Bowel sounds  Neurologic:  Alert and oriented X 3, normal speech,   Psych:   Good insight. Not anxious nor agitated  Skin:  No rashes.  No jaundice    Procedures: see electronic medical records for all procedures/Xrays and details which were not copied into this note but were reviewed prior to creation of Plan.      LABS:  I reviewed today's most current labs and imaging studies.  Pertinent labs include:  Recent Labs     25  1432 25  0713   WBC 6.0 5.4   HGB 13.1 11.7   HCT 40.0 37.1    190     Recent Labs     25  1432 25  0713   * 136   K 2.8* 3.2*    103   CO2 26 29   BUN 14 10   MG  --  2.2   PHOS  --  3.6   ALT 22  --

## 2025-02-04 NOTE — PLAN OF CARE
Problem: Discharge Planning  Goal: Discharge to home or other facility with appropriate resources  Outcome: Progressing  Flowsheets (Taken 2/3/2025 2200)  Discharge to home or other facility with appropriate resources:   Identify barriers to discharge with patient and caregiver   Identify discharge learning needs (meds, wound care, etc)     Problem: Pain  Goal: Verbalizes/displays adequate comfort level or baseline comfort level  Outcome: Progressing     Problem: Safety - Adult  Goal: Free from fall injury  Outcome: Progressing     Problem: ABCDS Injury Assessment  Goal: Absence of physical injury  Outcome: Progressing

## 2025-02-04 NOTE — CARE COORDINATION
02/04/25 1135   Service Assessment   Patient Orientation Alert and Oriented   Cognition Alert   History Provided By Patient   Primary Caregiver Self   Support Systems Children   Patient's Healthcare Decision Maker is: Legal Next of Kin   PCP Verified by CM Yes   Last Visit to PCP Within last 3 months   Prior Functional Level Independent in ADLs/IADLs;Assistance with the following:;Mobility  (Cane-PRN)   Current Functional Level Independent in ADLs/IADLs;Assistance with the following:;Mobility  (Cane-PRN)   Can patient return to prior living arrangement Yes   Ability to make needs known: Good   Family able to assist with home care needs: Yes   Would you like for me to discuss the discharge plan with any other family members/significant others, and if so, who? Yes   Financial Resources Medicare   Social/Functional History   Lives With Alone   Type of Home Apartment   Home Equipment Cane  (Cane-PRN usage)   Receives Help From Family   Prior Level of Assist for ADLs Independent   Prior Level of Assist for Homemaking Independent   Ambulation Assistance Needs assistance  (Cane PRN usage)   Prior Level of Assist for Transfers Independent   Discharge Planning   Living Arrangements Alone     OBS status.  GI consult.  IDR 48HRS.  Patient sitting up in the bed upon entrance into the room.  Demographics confirmed by the patient; lives alone in an apartment w/an elevator.  Last seen at listed PCP approx x 2 weeks ago.  Regular maintenance meds via [employer].  Short term medications via [facility]  Independent w/all ADL's & IADL's and drives.  No home O2.  DME consists of a cane-PRN usage.  No other DME.  Patient to drive herself home at time of discharge.     Advance Care Planning     General Advance Care Planning (ACP) Conversation    Date of Conversation: 2/4/2025  Conducted with: Patient with Decision Making Capacity  Other persons present: None    Healthcare Decision Maker:   Primary Decision

## 2025-02-04 NOTE — PROGRESS NOTES
Received Order for Telemetry     Dayan Lyons   1965   [id number]   Urinary tract infection [N39.0]   Manuela Galo MD     Tele Box # [id number] placed on patient at  1906 pm  ER Room # 10  Admitting to Room 222  Verified with Primary Nurse *** at {Time:[id number]}

## 2025-02-05 VITALS
RESPIRATION RATE: 16 BRPM | TEMPERATURE: 98.3 F | HEIGHT: 61 IN | BODY MASS INDEX: 47.2 KG/M2 | HEART RATE: 59 BPM | WEIGHT: 250 LBS | SYSTOLIC BLOOD PRESSURE: 110 MMHG | OXYGEN SATURATION: 94 % | DIASTOLIC BLOOD PRESSURE: 55 MMHG

## 2025-02-05 LAB
ANION GAP SERPL CALC-SCNC: 3 MMOL/L (ref 2–12)
BACTERIA SPEC CULT: ABNORMAL
BACTERIA SPEC CULT: ABNORMAL
BACTERIA SPEC CULT: NORMAL
BASOPHILS # BLD: 0.02 K/UL (ref 0–0.1)
BASOPHILS NFR BLD: 0.4 % (ref 0–1)
BUN SERPL-MCNC: 9 MG/DL (ref 6–20)
BUN/CREAT SERPL: 10 (ref 12–20)
CA-I BLD-MCNC: 9.1 MG/DL (ref 8.5–10.1)
CHLORIDE SERPL-SCNC: 102 MMOL/L (ref 97–108)
CO2 SERPL-SCNC: 32 MMOL/L (ref 21–32)
COLONY COUNT, CNT: NORMAL
CREAT SERPL-MCNC: 0.94 MG/DL (ref 0.55–1.02)
DIFFERENTIAL METHOD BLD: ABNORMAL
EOSINOPHIL # BLD: 0.23 K/UL (ref 0–0.4)
EOSINOPHIL NFR BLD: 4.5 % (ref 0–7)
ERYTHROCYTE [DISTWIDTH] IN BLOOD BY AUTOMATED COUNT: 13.1 % (ref 11.5–14.5)
GLUCOSE SERPL-MCNC: 103 MG/DL (ref 65–100)
HCT VFR BLD AUTO: 33.3 % (ref 35–47)
HGB BLD-MCNC: 10.6 G/DL (ref 11.5–16)
IMM GRANULOCYTES # BLD AUTO: 0.01 K/UL (ref 0–0.04)
IMM GRANULOCYTES NFR BLD AUTO: 0.2 % (ref 0–0.5)
LYMPHOCYTES # BLD: 1.29 K/UL (ref 0.8–3.5)
LYMPHOCYTES NFR BLD: 25.3 % (ref 12–49)
Lab: ABNORMAL
Lab: NORMAL
MCH RBC QN AUTO: 28 PG (ref 26–34)
MCHC RBC AUTO-ENTMCNC: 31.8 G/DL (ref 30–36.5)
MCV RBC AUTO: 88.1 FL (ref 80–99)
MONOCYTES # BLD: 0.43 K/UL (ref 0–1)
MONOCYTES NFR BLD: 8.4 % (ref 5–13)
NEUTS SEG # BLD: 3.12 K/UL (ref 1.8–8)
NEUTS SEG NFR BLD: 61.2 % (ref 32–75)
NRBC # BLD: 0 K/UL (ref 0–0.01)
NRBC BLD-RTO: 0 PER 100 WBC
PLATELET # BLD AUTO: 169 K/UL (ref 150–400)
PMV BLD AUTO: 11.7 FL (ref 8.9–12.9)
POTASSIUM SERPL-SCNC: 3.3 MMOL/L (ref 3.5–5.1)
RBC # BLD AUTO: 3.78 M/UL (ref 3.8–5.2)
SODIUM SERPL-SCNC: 137 MMOL/L (ref 136–145)
WBC # BLD AUTO: 5.1 K/UL (ref 3.6–11)

## 2025-02-05 PROCEDURE — 80048 BASIC METABOLIC PNL TOTAL CA: CPT

## 2025-02-05 PROCEDURE — 6370000000 HC RX 637 (ALT 250 FOR IP): Performed by: INTERNAL MEDICINE

## 2025-02-05 PROCEDURE — 6360000002 HC RX W HCPCS: Performed by: INTERNAL MEDICINE

## 2025-02-05 PROCEDURE — 36415 COLL VENOUS BLD VENIPUNCTURE: CPT

## 2025-02-05 PROCEDURE — 85025 COMPLETE CBC W/AUTO DIFF WBC: CPT

## 2025-02-05 RX ORDER — LEVOFLOXACIN 500 MG/1
500 TABLET, FILM COATED ORAL DAILY
Qty: 5 TABLET | Refills: 0 | Status: SHIPPED | OUTPATIENT
Start: 2025-02-05 | End: 2025-02-10

## 2025-02-05 RX ORDER — DICYCLOMINE HCL 20 MG
20 TABLET ORAL 4 TIMES DAILY PRN
Qty: 40 TABLET | Refills: 0 | Status: SHIPPED | OUTPATIENT
Start: 2025-02-05

## 2025-02-05 RX ORDER — LEVOFLOXACIN 500 MG/1
500 TABLET, FILM COATED ORAL DAILY
Status: DISCONTINUED | OUTPATIENT
Start: 2025-02-05 | End: 2025-02-05 | Stop reason: HOSPADM

## 2025-02-05 RX ORDER — OXYCODONE AND ACETAMINOPHEN 5; 325 MG/1; MG/1
1 TABLET ORAL EVERY 6 HOURS PRN
Qty: 12 TABLET | Refills: 0 | Status: SHIPPED | OUTPATIENT
Start: 2025-02-05 | End: 2025-02-08

## 2025-02-05 RX ADMIN — ASPIRIN 325 MG: 325 TABLET ORAL at 09:38

## 2025-02-05 RX ADMIN — Medication 2000 UNITS: at 09:38

## 2025-02-05 RX ADMIN — CIPROFLOXACIN 400 MG: 2 INJECTION, SOLUTION INTRAVENOUS at 09:46

## 2025-02-05 RX ADMIN — POTASSIUM BICARBONATE 40 MEQ: 782 TABLET, EFFERVESCENT ORAL at 09:39

## 2025-02-05 RX ADMIN — CETIRIZINE HYDROCHLORIDE 10 MG: 10 TABLET, FILM COATED ORAL at 09:37

## 2025-02-05 RX ADMIN — FLUOXETINE HYDROCHLORIDE 40 MG: 10 CAPSULE ORAL at 09:38

## 2025-02-05 RX ADMIN — FERROUS SULFATE TAB 325 MG (65 MG ELEMENTAL FE) 325 MG: 325 (65 FE) TAB at 09:37

## 2025-02-05 ASSESSMENT — PAIN SCALES - GENERAL
PAINLEVEL_OUTOF10: 0
PAINLEVEL_OUTOF10: 0

## 2025-02-05 NOTE — PLAN OF CARE
Problem: Discharge Planning  Goal: Discharge to home or other facility with appropriate resources  2/4/2025 2030 by Cheryl Styles RN  Outcome: Progressing  Flowsheets (Taken 2/4/2025 1945)  Discharge to home or other facility with appropriate resources:   Identify barriers to discharge with patient and caregiver   Identify discharge learning needs (meds, wound care, etc)  2/4/2025 1603 by Kari Duncan RN  Outcome: Progressing  Flowsheets (Taken 2/4/2025 0952)  Discharge to home or other facility with appropriate resources:   Identify discharge learning needs (meds, wound care, etc)   Identify barriers to discharge with patient and caregiver   Arrange for needed discharge resources and transportation as appropriate     Problem: Pain  Goal: Verbalizes/displays adequate comfort level or baseline comfort level  2/4/2025 2030 by Cheryl Styles RN  Outcome: Progressing  Flowsheets (Taken 2/4/2025 1945)  Verbalizes/displays adequate comfort level or baseline comfort level: Encourage patient to monitor pain and request assistance  2/4/2025 1603 by Kari Duncan RN  Outcome: Progressing  Flowsheets (Taken 2/4/2025 0952)  Verbalizes/displays adequate comfort level or baseline comfort level:   Encourage patient to monitor pain and request assistance   Assess pain using appropriate pain scale   Implement non-pharmacological measures as appropriate and evaluate response   Administer analgesics based on type and severity of pain and evaluate response     Problem: Safety - Adult  Goal: Free from fall injury  2/4/2025 2030 by Cheryl Styles RN  Outcome: Progressing  2/4/2025 1603 by Kari Duncan RN  Outcome: Progressing     Problem: ABCDS Injury Assessment  Goal: Absence of physical injury  2/4/2025 2030 by Cheryl Styles RN  Outcome: Progressing  Flowsheets (Taken 2/4/2025 1945)  Absence of Physical Injury: Implement safety measures based on patient assessment  2/4/2025 1603 by Kari Duncan

## 2025-02-05 NOTE — PROGRESS NOTES
4 Eyes Skin Assessment     NAME:  Dayan Lyons  YOB: 1965  MEDICAL RECORD NUMBER:  [id number]    The patient is being assessed for  Other weekly assessment    I agree that at least one RN has performed a thorough Head to Toe Skin Assessment on the patient. ALL assessment sites listed below have been assessed.      Areas assessed by both nurses:    Head, Face, Ears, Shoulders, Back, Chest, Arms, Elbows, Hands, Sacrum. Buttock, Coccyx, Ischium, Legs. Feet and Heels, and Under Medical Devices         Does the Patient have a Wound? No noted wound(s)       Sage Prevention initiated by RN: Yes  Wound Care Orders initiated by RN: No    Pressure Injury (Stage 3,4, Unstageable, DTI, NWPT, and Complex wounds) if present, place Wound referral order by RN under : No    New Ostomies, if present place, Ostomy referral order under : No     Nurse 1 eSignature: Electronically signed by Kari Duncan RN on 2/5/25 at 10:19 AM EST    **SHARE this note so that the co-signing nurse can place an eSignature**    Nurse 2 eSignature: {Esignature:[id number]}

## 2025-02-05 NOTE — PROGRESS NOTES
Pt reported PIV leaking during infusion of ciprofloxacin. Infusion stopped & PIV removed after assessment. Message sent to Dr. Shaw inquiring if IV Cipro can be changed to PO route. Pending reply.

## 2025-02-05 NOTE — DISCHARGE SUMMARY
explained to the patient in details agreeable to current plan.          _______________________________________________________________________  Patient seen and examined by me on discharge day.  Pertinent Findings:  Gen:    Not in distress  Chest: Clear lungs  CVS:   Regular rhythm, S1 plus S2, no murmurs rubs gallops no edema  Abd:  Soft, not distended, not tender  Neuro:  Alert, oriented x 4  _______________________________________________________________________  DISCHARGE MEDICATIONS:      Medication List        START taking these medications      dicyclomine 20 MG tablet  Commonly known as: BENTYL  Take 1 tablet by mouth 4 times daily as needed (cramping)     levoFLOXacin 500 MG tablet  Commonly known as: LEVAQUIN  Take 1 tablet by mouth daily for 5 doses     oxyCODONE-acetaminophen 5-325 MG per tablet  Commonly known as: Percocet  Take 1 tablet by mouth every 6 hours as needed for Pain for up to 3 days. Intended supply: 3 days. Take lowest dose possible to manage pain Max Daily Amount: 4 tablets            CONTINUE taking these medications      aspirin 325 MG tablet     atorvastatin 20 MG tablet  Commonly known as: LIPITOR     cetirizine 10 MG tablet  Commonly known as: ZYRTEC     ferrous sulfate 325 (65 Fe) MG tablet  Commonly known as: IRON 325     FLUoxetine 40 MG capsule  Commonly known as: PROzac     hydroCHLOROthiazide 25 MG tablet  Commonly known as: HYDRODIURIL     metoprolol tartrate 50 MG tablet  Commonly known as: LOPRESSOR     omeprazole 40 MG delayed release capsule  Commonly known as: PRILOSEC     therapeutic multivitamin-minerals tablet     traZODone 100 MG tablet  Commonly known as: DESYREL     vitamin D 50 MCG (2000 UT) Caps capsule  Commonly known as: CHOLECALCIFEROL               Where to Get Your Medications        These medications were sent to [facility]/pharmacy #[id number] - [place], [place] - [address] [phone] - F [phone]  [address] [facility]

## 2025-02-05 NOTE — CARE COORDINATION
Transition of Care Plan:    RUR: 8%   Prior Level of Functioning:   Disposition: Home   Follow up appointments: PCP & GI  DME needed:   Transportation at discharge: Self  IM/IMM Medicare/ letter given: Medicare pt has received, reviewed, and signed 2nd IM letter informing them of their right to appeal the discharge.  Signed copy has been placed on pt bedside chart.  Is patient a  and connected with [facility]? N/A  If yes, was  transfer form completed and [facility] notified? N/A  Caregiver Contact:   Discharge Caregiver contacted prior to discharge? Patient contacted family  Care Conference needed?   Barriers to discharge: N/A    Met w/patient at the bedside to discuss food insufficiency/medications.  Suggested patient rik the [employer]; reach out to Meals on Wheels.  Patient voiced \"I've reapplied for Medicaid benefits.\"  Suggested she follow up w/DSS.      Patient has prescription coverage Part D.      S. NAINA Brennan

## 2025-02-09 LAB
BACTERIA SPEC CULT: NORMAL
Lab: NORMAL

## 2025-02-11 LAB
BACTERIA SPEC CULT: NORMAL
Lab: NORMAL

## 2025-03-05 PROBLEM — N39.0 URINARY TRACT INFECTION: Status: RESOLVED | Noted: 2025-02-03 | Resolved: 2025-03-05

## 2025-03-06 PROBLEM — N39.0 UTI (URINARY TRACT INFECTION): Status: RESOLVED | Noted: 2025-02-04 | Resolved: 2025-03-06

## 2025-07-20 ENCOUNTER — APPOINTMENT (OUTPATIENT)
Facility: HOSPITAL | Age: 60
End: 2025-07-20
Payer: MEDICARE

## 2025-07-20 ENCOUNTER — HOSPITAL ENCOUNTER (INPATIENT)
Facility: HOSPITAL | Age: 60
LOS: 11 days | Discharge: HOME HEALTH | End: 2025-08-01
Attending: EMERGENCY MEDICINE | Admitting: SURGERY
Payer: MEDICARE

## 2025-07-20 DIAGNOSIS — K65.1 INTRA-ABDOMINAL ABSCESS (HCC): ICD-10-CM

## 2025-07-20 DIAGNOSIS — K57.20 DIVERTICULITIS OF LARGE INTESTINE WITH ABSCESS WITHOUT BLEEDING: Primary | ICD-10-CM

## 2025-07-20 LAB
ALBUMIN SERPL-MCNC: 3 G/DL (ref 3.5–5)
ALBUMIN/GLOB SERPL: 0.6 (ref 1.1–2.2)
ALP SERPL-CCNC: 66 U/L (ref 45–117)
ALT SERPL-CCNC: 20 U/L (ref 12–78)
ANION GAP SERPL CALC-SCNC: 15 MMOL/L (ref 2–12)
APPEARANCE UR: ABNORMAL
AST SERPL W P-5'-P-CCNC: 19 U/L (ref 15–37)
BACTERIA URNS QL MICRO: NEGATIVE /HPF
BASOPHILS # BLD: 0.01 K/UL (ref 0–0.1)
BASOPHILS NFR BLD: 0.1 % (ref 0–1)
BILIRUB SERPL-MCNC: 0.7 MG/DL (ref 0.2–1)
BILIRUB UR QL: NEGATIVE
BUN SERPL-MCNC: 16 MG/DL (ref 6–20)
BUN/CREAT SERPL: 16 (ref 12–20)
CA-I BLD-MCNC: 9 MG/DL (ref 8.5–10.1)
CHLORIDE SERPL-SCNC: 98 MMOL/L (ref 97–108)
CO2 SERPL-SCNC: 20 MMOL/L (ref 21–32)
COLOR UR: ABNORMAL
CREAT SERPL-MCNC: 1.03 MG/DL (ref 0.55–1.02)
DIFFERENTIAL METHOD BLD: ABNORMAL
EOSINOPHIL # BLD: 0.02 K/UL (ref 0–0.4)
EOSINOPHIL NFR BLD: 0.2 % (ref 0–7)
EPITH CASTS URNS QL MICRO: ABNORMAL /LPF
ERYTHROCYTE [DISTWIDTH] IN BLOOD BY AUTOMATED COUNT: 13.7 % (ref 11.5–14.5)
FLUAV RNA SPEC QL NAA+PROBE: NOT DETECTED
FLUBV RNA SPEC QL NAA+PROBE: NOT DETECTED
GLOBULIN SER CALC-MCNC: 4.9 G/DL (ref 2–4)
GLUCOSE SERPL-MCNC: 121 MG/DL (ref 65–100)
GLUCOSE UR STRIP.AUTO-MCNC: NEGATIVE MG/DL
HCT VFR BLD AUTO: 32.3 % (ref 35–47)
HGB BLD-MCNC: 11 G/DL (ref 11.5–16)
HGB UR QL STRIP: ABNORMAL
HYALINE CASTS URNS QL MICRO: ABNORMAL /LPF (ref 0–5)
IMM GRANULOCYTES # BLD AUTO: 0.05 K/UL (ref 0–0.04)
IMM GRANULOCYTES NFR BLD AUTO: 0.6 % (ref 0–0.5)
KETONES UR QL STRIP.AUTO: 20 MG/DL
LACTATE SERPL-SCNC: 1.4 MMOL/L (ref 0.4–2)
LEUKOCYTE ESTERASE UR QL STRIP.AUTO: NEGATIVE
LYMPHOCYTES # BLD: 0.69 K/UL (ref 0.8–3.5)
LYMPHOCYTES NFR BLD: 8.5 % (ref 12–49)
MAGNESIUM SERPL-MCNC: 1.8 MG/DL (ref 1.6–2.4)
MCH RBC QN AUTO: 29.5 PG (ref 26–34)
MCHC RBC AUTO-ENTMCNC: 34.1 G/DL (ref 30–36.5)
MCV RBC AUTO: 86.6 FL (ref 80–99)
MONOCYTES # BLD: 0.54 K/UL (ref 0–1)
MONOCYTES NFR BLD: 6.6 % (ref 5–13)
MUCOUS THREADS URNS QL MICRO: ABNORMAL /LPF
NEUTS SEG # BLD: 6.82 K/UL (ref 1.8–8)
NEUTS SEG NFR BLD: 84 % (ref 32–75)
NITRITE UR QL STRIP.AUTO: NEGATIVE
NRBC # BLD: 0 K/UL (ref 0–0.01)
NRBC BLD-RTO: 0 PER 100 WBC
PH UR STRIP: 5 (ref 5–8)
PLATELET # BLD AUTO: 228 K/UL (ref 150–400)
PMV BLD AUTO: 11.3 FL (ref 8.9–12.9)
POTASSIUM SERPL-SCNC: 2.6 MMOL/L (ref 3.5–5.1)
PROT SERPL-MCNC: 7.9 G/DL (ref 6.4–8.2)
PROT UR STRIP-MCNC: 100 MG/DL
RBC # BLD AUTO: 3.73 M/UL (ref 3.8–5.2)
RBC #/AREA URNS HPF: ABNORMAL /HPF (ref 0–5)
SARS-COV-2 RNA RESP QL NAA+PROBE: NOT DETECTED
SODIUM SERPL-SCNC: 133 MMOL/L (ref 136–145)
SP GR UR REFRACTOMETRY: 1.02 (ref 1–1.03)
UROBILINOGEN UR QL STRIP.AUTO: 4 EU/DL (ref 0.1–1)
WBC # BLD AUTO: 8.1 K/UL (ref 3.6–11)
WBC URNS QL MICRO: ABNORMAL /HPF (ref 0–4)

## 2025-07-20 PROCEDURE — 71045 X-RAY EXAM CHEST 1 VIEW: CPT

## 2025-07-20 PROCEDURE — 2580000003 HC RX 258: Performed by: EMERGENCY MEDICINE

## 2025-07-20 PROCEDURE — 87636 SARSCOV2 & INF A&B AMP PRB: CPT

## 2025-07-20 PROCEDURE — 85025 COMPLETE CBC W/AUTO DIFF WBC: CPT

## 2025-07-20 PROCEDURE — 96374 THER/PROPH/DIAG INJ IV PUSH: CPT

## 2025-07-20 PROCEDURE — 6370000000 HC RX 637 (ALT 250 FOR IP): Performed by: EMERGENCY MEDICINE

## 2025-07-20 PROCEDURE — 6360000002 HC RX W HCPCS: Performed by: EMERGENCY MEDICINE

## 2025-07-20 PROCEDURE — 74177 CT ABD & PELVIS W/CONTRAST: CPT

## 2025-07-20 PROCEDURE — 99285 EMERGENCY DEPT VISIT HI MDM: CPT

## 2025-07-20 PROCEDURE — 6360000004 HC RX CONTRAST MEDICATION: Performed by: EMERGENCY MEDICINE

## 2025-07-20 PROCEDURE — 80053 COMPREHEN METABOLIC PANEL: CPT

## 2025-07-20 PROCEDURE — 83605 ASSAY OF LACTIC ACID: CPT

## 2025-07-20 PROCEDURE — 81001 URINALYSIS AUTO W/SCOPE: CPT

## 2025-07-20 PROCEDURE — 83735 ASSAY OF MAGNESIUM: CPT

## 2025-07-20 RX ORDER — ONDANSETRON 2 MG/ML
4 INJECTION INTRAMUSCULAR; INTRAVENOUS ONCE
Status: COMPLETED | OUTPATIENT
Start: 2025-07-20 | End: 2025-07-20

## 2025-07-20 RX ORDER — IOPAMIDOL 755 MG/ML
100 INJECTION, SOLUTION INTRAVASCULAR
Status: COMPLETED | OUTPATIENT
Start: 2025-07-20 | End: 2025-07-20

## 2025-07-20 RX ORDER — 0.9 % SODIUM CHLORIDE 0.9 %
1000 INTRAVENOUS SOLUTION INTRAVENOUS
Status: COMPLETED | OUTPATIENT
Start: 2025-07-20 | End: 2025-07-20

## 2025-07-20 RX ORDER — POTASSIUM CHLORIDE 750 MG/1
40 TABLET, EXTENDED RELEASE ORAL ONCE
Status: COMPLETED | OUTPATIENT
Start: 2025-07-20 | End: 2025-07-20

## 2025-07-20 RX ORDER — NAPROXEN 500 MG/1
500 TABLET ORAL
Status: COMPLETED | OUTPATIENT
Start: 2025-07-20 | End: 2025-07-20

## 2025-07-20 RX ADMIN — NAPROXEN 500 MG: 500 TABLET ORAL at 21:27

## 2025-07-20 RX ADMIN — IOPAMIDOL 100 ML: 755 INJECTION, SOLUTION INTRAVENOUS at 23:08

## 2025-07-20 RX ADMIN — POTASSIUM CHLORIDE 40 MEQ: 750 TABLET, FILM COATED, EXTENDED RELEASE ORAL at 22:41

## 2025-07-20 RX ADMIN — SODIUM CHLORIDE 1000 ML: 0.9 INJECTION, SOLUTION INTRAVENOUS at 21:38

## 2025-07-20 RX ADMIN — SODIUM CHLORIDE 1000 ML: 0.9 INJECTION, SOLUTION INTRAVENOUS at 22:43

## 2025-07-20 RX ADMIN — ONDANSETRON 4 MG: 2 INJECTION, SOLUTION INTRAMUSCULAR; INTRAVENOUS at 21:28

## 2025-07-20 ASSESSMENT — PAIN - FUNCTIONAL ASSESSMENT
PAIN_FUNCTIONAL_ASSESSMENT: 0-10
PAIN_FUNCTIONAL_ASSESSMENT: 0-10

## 2025-07-20 ASSESSMENT — PAIN SCALES - GENERAL
PAINLEVEL_OUTOF10: 2
PAINLEVEL_OUTOF10: 7
PAINLEVEL_OUTOF10: 7

## 2025-07-20 ASSESSMENT — LIFESTYLE VARIABLES
HOW MANY STANDARD DRINKS CONTAINING ALCOHOL DO YOU HAVE ON A TYPICAL DAY: 1 OR 2
HOW OFTEN DO YOU HAVE A DRINK CONTAINING ALCOHOL: MONTHLY OR LESS

## 2025-07-21 PROBLEM — K50.114 CROHN'S DISEASE OF COLON WITH ABSCESS (HCC): Status: ACTIVE | Noted: 2025-07-21

## 2025-07-21 PROBLEM — K65.1 ABDOMINAL VISCERAL ABSCESS (HCC): Status: ACTIVE | Noted: 2025-07-21

## 2025-07-21 PROBLEM — K57.92 DIVERTICULITIS: Status: ACTIVE | Noted: 2025-07-21

## 2025-07-21 LAB
ANION GAP SERPL CALC-SCNC: 9 MMOL/L (ref 2–12)
BUN SERPL-MCNC: 12 MG/DL (ref 6–20)
BUN/CREAT SERPL: 16 (ref 12–20)
CA-I BLD-MCNC: 8 MG/DL (ref 8.5–10.1)
CHLORIDE SERPL-SCNC: 105 MMOL/L (ref 97–108)
CO2 SERPL-SCNC: 20 MMOL/L (ref 21–32)
CREAT SERPL-MCNC: 0.76 MG/DL (ref 0.55–1.02)
CRP SERPL-MCNC: 15.3 MG/DL (ref 0–0.3)
ERYTHROCYTE [DISTWIDTH] IN BLOOD BY AUTOMATED COUNT: 13.9 % (ref 11.5–14.5)
ERYTHROCYTE [SEDIMENTATION RATE] IN BLOOD: 128 MM/HR (ref 0–30)
GLUCOSE SERPL-MCNC: 115 MG/DL (ref 65–100)
HCT VFR BLD AUTO: 25.6 % (ref 35–47)
HGB BLD-MCNC: 8.5 G/DL (ref 11.5–16)
MCH RBC QN AUTO: 29.6 PG (ref 26–34)
MCHC RBC AUTO-ENTMCNC: 33.2 G/DL (ref 30–36.5)
MCV RBC AUTO: 89.2 FL (ref 80–99)
MRSA DNA SPEC QL NAA+PROBE: NOT DETECTED
MRSA DNA SPEC QL NAA+PROBE: NOT DETECTED
NRBC # BLD: 0 K/UL (ref 0–0.01)
NRBC BLD-RTO: 0 PER 100 WBC
PLATELET # BLD AUTO: 164 K/UL (ref 150–400)
PMV BLD AUTO: 11.5 FL (ref 8.9–12.9)
POTASSIUM SERPL-SCNC: 3.3 MMOL/L (ref 3.5–5.1)
RBC # BLD AUTO: 2.87 M/UL (ref 3.8–5.2)
SODIUM SERPL-SCNC: 134 MMOL/L (ref 136–145)
WBC # BLD AUTO: 6 K/UL (ref 3.6–11)

## 2025-07-21 PROCEDURE — 87641 MR-STAPH DNA AMP PROBE: CPT

## 2025-07-21 PROCEDURE — 2500000003 HC RX 250 WO HCPCS: Performed by: STUDENT IN AN ORGANIZED HEALTH CARE EDUCATION/TRAINING PROGRAM

## 2025-07-21 PROCEDURE — 99222 1ST HOSP IP/OBS MODERATE 55: CPT | Performed by: STUDENT IN AN ORGANIZED HEALTH CARE EDUCATION/TRAINING PROGRAM

## 2025-07-21 PROCEDURE — 80048 BASIC METABOLIC PNL TOTAL CA: CPT

## 2025-07-21 PROCEDURE — 6360000002 HC RX W HCPCS: Performed by: SURGERY

## 2025-07-21 PROCEDURE — 6370000000 HC RX 637 (ALT 250 FOR IP): Performed by: SURGERY

## 2025-07-21 PROCEDURE — 2500000003 HC RX 250 WO HCPCS: Performed by: SURGERY

## 2025-07-21 PROCEDURE — 85652 RBC SED RATE AUTOMATED: CPT

## 2025-07-21 PROCEDURE — 1100000000 HC RM PRIVATE

## 2025-07-21 PROCEDURE — 6370000000 HC RX 637 (ALT 250 FOR IP): Performed by: STUDENT IN AN ORGANIZED HEALTH CARE EDUCATION/TRAINING PROGRAM

## 2025-07-21 PROCEDURE — 85027 COMPLETE CBC AUTOMATED: CPT

## 2025-07-21 PROCEDURE — 86140 C-REACTIVE PROTEIN: CPT

## 2025-07-21 RX ORDER — HYDROCHLOROTHIAZIDE 25 MG/1
25 TABLET ORAL DAILY
Status: DISCONTINUED | OUTPATIENT
Start: 2025-07-21 | End: 2025-08-01 | Stop reason: HOSPADM

## 2025-07-21 RX ORDER — SODIUM CHLORIDE 9 MG/ML
INJECTION, SOLUTION INTRAVENOUS PRN
Status: DISCONTINUED | OUTPATIENT
Start: 2025-07-21 | End: 2025-08-01 | Stop reason: HOSPADM

## 2025-07-21 RX ORDER — POTASSIUM CHLORIDE 7.45 MG/ML
10 INJECTION INTRAVENOUS PRN
Status: DISCONTINUED | OUTPATIENT
Start: 2025-07-21 | End: 2025-08-01 | Stop reason: HOSPADM

## 2025-07-21 RX ORDER — SODIUM CHLORIDE 0.9 % (FLUSH) 0.9 %
5-40 SYRINGE (ML) INJECTION EVERY 12 HOURS SCHEDULED
Status: DISCONTINUED | OUTPATIENT
Start: 2025-07-21 | End: 2025-08-01 | Stop reason: HOSPADM

## 2025-07-21 RX ORDER — METOPROLOL TARTRATE 50 MG
50 TABLET ORAL DAILY
Status: DISCONTINUED | OUTPATIENT
Start: 2025-07-21 | End: 2025-08-01 | Stop reason: HOSPADM

## 2025-07-21 RX ORDER — VITAMIN B COMPLEX
2000 TABLET ORAL DAILY
Status: DISCONTINUED | OUTPATIENT
Start: 2025-07-21 | End: 2025-08-01 | Stop reason: HOSPADM

## 2025-07-21 RX ORDER — ONDANSETRON 2 MG/ML
4 INJECTION INTRAMUSCULAR; INTRAVENOUS EVERY 6 HOURS PRN
Status: DISCONTINUED | OUTPATIENT
Start: 2025-07-21 | End: 2025-08-01 | Stop reason: HOSPADM

## 2025-07-21 RX ORDER — ACETAMINOPHEN 325 MG/1
650 TABLET ORAL EVERY 6 HOURS
Status: DISCONTINUED | OUTPATIENT
Start: 2025-07-21 | End: 2025-08-01 | Stop reason: HOSPADM

## 2025-07-21 RX ORDER — ASPIRIN 81 MG/1
81 TABLET, CHEWABLE ORAL DAILY
Status: DISCONTINUED | OUTPATIENT
Start: 2025-07-21 | End: 2025-08-01 | Stop reason: HOSPADM

## 2025-07-21 RX ORDER — CIPROFLOXACIN 2 MG/ML
400 INJECTION, SOLUTION INTRAVENOUS EVERY 12 HOURS
Status: DISCONTINUED | OUTPATIENT
Start: 2025-07-21 | End: 2025-07-24

## 2025-07-21 RX ORDER — POTASSIUM CHLORIDE 1500 MG/1
40 TABLET, EXTENDED RELEASE ORAL PRN
Status: DISCONTINUED | OUTPATIENT
Start: 2025-07-21 | End: 2025-08-01 | Stop reason: HOSPADM

## 2025-07-21 RX ORDER — MORPHINE SULFATE 2 MG/ML
2 INJECTION, SOLUTION INTRAMUSCULAR; INTRAVENOUS
Status: ACTIVE | OUTPATIENT
Start: 2025-07-21 | End: 2025-07-21

## 2025-07-21 RX ORDER — POLYETHYLENE GLYCOL 3350 17 G/17G
17 POWDER, FOR SOLUTION ORAL DAILY
Status: DISCONTINUED | OUTPATIENT
Start: 2025-07-21 | End: 2025-07-25

## 2025-07-21 RX ORDER — ROSUVASTATIN CALCIUM 20 MG/1
40 TABLET, COATED ORAL NIGHTLY
Status: DISCONTINUED | OUTPATIENT
Start: 2025-07-21 | End: 2025-08-01 | Stop reason: HOSPADM

## 2025-07-21 RX ORDER — PANTOPRAZOLE SODIUM 40 MG/1
40 TABLET, DELAYED RELEASE ORAL
Status: DISCONTINUED | OUTPATIENT
Start: 2025-07-22 | End: 2025-08-01 | Stop reason: HOSPADM

## 2025-07-21 RX ORDER — SODIUM CHLORIDE 0.9 % (FLUSH) 0.9 %
5-40 SYRINGE (ML) INJECTION PRN
Status: DISCONTINUED | OUTPATIENT
Start: 2025-07-21 | End: 2025-08-01 | Stop reason: HOSPADM

## 2025-07-21 RX ORDER — DEXTROSE MONOHYDRATE, SODIUM CHLORIDE, AND POTASSIUM CHLORIDE 50; 1.49; 4.5 G/1000ML; G/1000ML; G/1000ML
INJECTION, SOLUTION INTRAVENOUS CONTINUOUS
Status: DISCONTINUED | OUTPATIENT
Start: 2025-07-21 | End: 2025-07-30

## 2025-07-21 RX ORDER — ENOXAPARIN SODIUM 100 MG/ML
40 INJECTION SUBCUTANEOUS DAILY
Status: DISCONTINUED | OUTPATIENT
Start: 2025-07-21 | End: 2025-08-01 | Stop reason: HOSPADM

## 2025-07-21 RX ORDER — METRONIDAZOLE 500 MG/100ML
500 INJECTION, SOLUTION INTRAVENOUS EVERY 8 HOURS
Status: DISCONTINUED | OUTPATIENT
Start: 2025-07-21 | End: 2025-07-24

## 2025-07-21 RX ORDER — METOPROLOL SUCCINATE 50 MG/1
50 TABLET, EXTENDED RELEASE ORAL DAILY
COMMUNITY

## 2025-07-21 RX ORDER — ONDANSETRON 4 MG/1
4 TABLET, ORALLY DISINTEGRATING ORAL EVERY 8 HOURS PRN
Status: DISCONTINUED | OUTPATIENT
Start: 2025-07-21 | End: 2025-08-01 | Stop reason: HOSPADM

## 2025-07-21 RX ADMIN — HYDROMORPHONE HYDROCHLORIDE 0.5 MG: 1 INJECTION, SOLUTION INTRAMUSCULAR; INTRAVENOUS; SUBCUTANEOUS at 04:46

## 2025-07-21 RX ADMIN — DEXTROSE MONOHYDRATE, SODIUM CHLORIDE, AND POTASSIUM CHLORIDE: 50; 1.49; 4.5 INJECTION, SOLUTION INTRAVENOUS at 15:14

## 2025-07-21 RX ADMIN — HYDROMORPHONE HYDROCHLORIDE 0.5 MG: 1 INJECTION, SOLUTION INTRAMUSCULAR; INTRAVENOUS; SUBCUTANEOUS at 14:23

## 2025-07-21 RX ADMIN — POTASSIUM CHLORIDE 10 MEQ: 7.45 INJECTION INTRAVENOUS at 05:43

## 2025-07-21 RX ADMIN — METRONIDAZOLE 500 MG: 500 INJECTION, SOLUTION INTRAVENOUS at 02:29

## 2025-07-21 RX ADMIN — FLUOXETINE HYDROCHLORIDE 40 MG: 20 CAPSULE ORAL at 10:43

## 2025-07-21 RX ADMIN — ACETAMINOPHEN 650 MG: 325 TABLET ORAL at 02:24

## 2025-07-21 RX ADMIN — ACETAMINOPHEN 650 MG: 325 TABLET ORAL at 17:46

## 2025-07-21 RX ADMIN — DEXTROSE MONOHYDRATE, SODIUM CHLORIDE, AND POTASSIUM CHLORIDE: 50; 1.49; 4.5 INJECTION, SOLUTION INTRAVENOUS at 02:22

## 2025-07-21 RX ADMIN — METRONIDAZOLE 500 MG: 500 INJECTION, SOLUTION INTRAVENOUS at 18:19

## 2025-07-21 RX ADMIN — ACETAMINOPHEN 650 MG: 325 TABLET ORAL at 12:54

## 2025-07-21 RX ADMIN — POTASSIUM CHLORIDE 10 MEQ: 7.45 INJECTION INTRAVENOUS at 08:16

## 2025-07-21 RX ADMIN — SODIUM CHLORIDE, PRESERVATIVE FREE 10 ML: 5 INJECTION INTRAVENOUS at 20:55

## 2025-07-21 RX ADMIN — POTASSIUM BICARBONATE 40 MEQ: 782 TABLET, EFFERVESCENT ORAL at 15:15

## 2025-07-21 RX ADMIN — ACETAMINOPHEN 650 MG: 325 TABLET ORAL at 08:21

## 2025-07-21 RX ADMIN — ASPIRIN 81 MG: 81 TABLET, CHEWABLE ORAL at 10:44

## 2025-07-21 RX ADMIN — METRONIDAZOLE 500 MG: 500 INJECTION, SOLUTION INTRAVENOUS at 08:28

## 2025-07-21 RX ADMIN — ROSUVASTATIN CALCIUM 40 MG: 20 TABLET, FILM COATED ORAL at 20:55

## 2025-07-21 RX ADMIN — HYDROMORPHONE HYDROCHLORIDE 0.5 MG: 1 INJECTION, SOLUTION INTRAMUSCULAR; INTRAVENOUS; SUBCUTANEOUS at 20:56

## 2025-07-21 RX ADMIN — POTASSIUM CHLORIDE 10 MEQ: 7.45 INJECTION INTRAVENOUS at 04:35

## 2025-07-21 RX ADMIN — CIPROFLOXACIN 400 MG: 2 INJECTION, SOLUTION INTRAVENOUS at 02:20

## 2025-07-21 ASSESSMENT — PAIN SCALES - GENERAL
PAINLEVEL_OUTOF10: 6
PAINLEVEL_OUTOF10: 8
PAINLEVEL_OUTOF10: 7
PAINLEVEL_OUTOF10: 0
PAINLEVEL_OUTOF10: 5
PAINLEVEL_OUTOF10: 4
PAINLEVEL_OUTOF10: 5
PAINLEVEL_OUTOF10: 7
PAINLEVEL_OUTOF10: 9
PAINLEVEL_OUTOF10: 0
PAINLEVEL_OUTOF10: 8

## 2025-07-21 ASSESSMENT — PAIN DESCRIPTION - ORIENTATION
ORIENTATION: LOWER
ORIENTATION: ANTERIOR

## 2025-07-21 ASSESSMENT — PAIN - FUNCTIONAL ASSESSMENT
PAIN_FUNCTIONAL_ASSESSMENT: ACTIVITIES ARE NOT PREVENTED

## 2025-07-21 ASSESSMENT — PAIN SCALES - WONG BAKER: WONGBAKER_NUMERICALRESPONSE: NO HURT

## 2025-07-21 ASSESSMENT — PAIN DESCRIPTION - LOCATION
LOCATION: ABDOMEN

## 2025-07-21 ASSESSMENT — PAIN DESCRIPTION - DESCRIPTORS
DESCRIPTORS: SHARP
DESCRIPTORS: ACHING;CRAMPING
DESCRIPTORS: ACHING
DESCRIPTORS: CRAMPING;ACHING
DESCRIPTORS: ACHING;SHARP

## 2025-07-21 NOTE — ED NOTES
ED TO INPATIENT SBAR HANDOFF    Patient Name: Dayan Lyons   Preferred Name: Dayan  : 1965  60 y.o.   Family/Caregiver Present: no   Code Status Order: Full Code  PO Status: NPO:Yes  Telemetry Order: No  C-SSRS: Risk of Suicide: No Risk  Sitter no   Restraints:     Sepsis Risk Score Sepsis V2 Risk Score: 28.8    Situation  Chief Complaint   Patient presents with    Fever     Brief Description of Patient's Condition: Patient states she has been having a fever on and off for the past week getting up to 102 degrees. She states that the fever will go away then return. Complaints of trouble urinating   Mental Status: oriented and alert  Arrived from:Home  Imaging:   XR CHEST PORTABLE   Final Result      No acute process on portable chest.         Electronically signed by Elian Will      CT ABDOMEN PELVIS W IV CONTRAST Additional Contrast? None   Final Result   3.7 cm complex pelvic fluid collection is compatible with abscess; this is   likely diverticular in origin, although it extends to the dome of the urinary   bladder. Enlarged left inguinal lymph nodes are likely reactive.      Electronically signed by Elian Will        Abnormal labs:   Abnormal Labs Reviewed   URINALYSIS - Abnormal; Notable for the following components:       Result Value    Appearance Turbid (*)     Protein,  (*)     Ketones, Urine 20 (*)     Blood, Urine Small (*)     Urobilinogen, Urine 4.0 (*)     All other components within normal limits   CBC WITH AUTO DIFFERENTIAL - Abnormal; Notable for the following components:    RBC 3.73 (*)     Hemoglobin 11.0 (*)     Hematocrit 32.3 (*)     Neutrophils % 84.0 (*)     Lymphocytes % 8.5 (*)     Immature Granulocytes % 0.6 (*)     Lymphocytes Absolute 0.69 (*)     Immature Granulocytes Absolute 0.05 (*)     All other components within normal limits   COMPREHENSIVE METABOLIC PANEL - Abnormal; Notable for the following components:    Sodium 133 (*)     Potassium 2.6 (*)     CO2 20 (*)

## 2025-07-21 NOTE — PLAN OF CARE
Problem: Pain  Goal: Verbalizes/displays adequate comfort level or baseline comfort level  7/21/2025 1409 by Nanette Oviedo, RN  Outcome: Progressing  7/21/2025 2885 by PATRICIA Clement, RN  Outcome: Progressing     Problem: Skin/Tissue Integrity  Goal: Skin integrity remains intact  Description: 1.  Monitor for areas of redness and/or skin breakdown  2.  Assess vascular access sites hourly  3.  Every 4-6 hours minimum:  Change oxygen saturation probe site  4.  Every 4-6 hours:  If on nasal continuous positive airway pressure, respiratory therapy assess nares and determine need for appliance change or resting period  Outcome: Progressing  Flowsheets (Taken 7/21/2025 0832)  Skin Integrity Remains Intact: Monitor for areas of redness and/or skin breakdown

## 2025-07-21 NOTE — ED PROVIDER NOTES
[facility] EMERGENCY DEPT  EMERGENCY DEPARTMENT HISTORY AND PHYSICAL EXAM      Date of evaluation: 2025  Patient Name: Dayan Lyons  Birthdate 1965  MRN: [id number]  ED Provider: Abrahan Alves MD   Note Started: 9:09 PM EDT 25    HISTORY OF PRESENT ILLNESS     Chief Complaint   Patient presents with    Fever       History Provided By: Patient, only     HPI: Dayan Lyons is a 60 y.o. female no past medical history significant for Crohn's disease and diverticulitis, fibromyalgia presents with abdominal pain fever chills nausea decreased p.o. intake over the past several days.  She says she has had waxing and waning fevers at home up to 102 earlier today.  She is 100.4 in the emergency room.    PAST MEDICAL HISTORY   Past Medical History:  Past Medical History:   Diagnosis Date    Anemia, unspecified 2011    Autoimmune disease     fibromyalgia    Coagulation defects     anemia    Crohn's disease (HCC)     Depression     Fibromyalgia     GERD (gastroesophageal reflux disease)     Hypercholesterolemia     Hypertension     Musculoskeletal disorder     Other ill-defined conditions(799.89)     crohns    Psychiatric disorder     depression    Unspecified sleep apnea     cpap       Past Surgical History:  Past Surgical History:   Procedure Laterality Date    CHOLECYSTECTOMY  -    COLONOSCOPY N/A 2020    COLONOSCOPY performed by Bandar De Santiago MD at [facility] ENDOSCOPY    OVARY REMOVAL      right       Family History:  Family History   Problem Relation Age of Onset    Dementia Mother     Hypertension Mother     Heart Disease Father     Hypertension Father     Migraines Mother        Social History:  Social History     Tobacco Use    Smoking status: Former     Current packs/day: 0.00     Types: Cigarettes     Quit date: 1998     Years since quittin.9    Smokeless tobacco: Never   Substance Use Topics    Alcohol use: Yes    Drug use: No       Allergies:  Allergies   Allergen Reactions

## 2025-07-21 NOTE — CARE COORDINATION
07/21/25 1316   Service Assessment   Patient Orientation Alert and Oriented;Person;Place;Situation;Self   Cognition Alert   History Provided By Patient   Primary Caregiver Self   Accompanied By/Relationship no one at bedside   Support Systems Children   Patient's Healthcare Decision Maker is: Legal Next of Kin   PCP Verified by CM Yes   Last Visit to PCP Within last 6 months   Prior Functional Level Independent in ADLs/IADLs   Can patient return to prior living arrangement Yes   Ability to make needs known: Good   Family able to assist with home care needs: Yes   Social/Functional History   Lives With Alone   Type of Home Apartment   Home Layout   (pt lives on 3rd floor, building has elevator)   Home Equipment Walker - Rolling  (Pt has CPAP but does not use it)   Receives Help From Family   Prior Level of Assist for ADLs Independent   Active  Yes   Occupation On disability   Discharge Planning   Type of Residence Apartment   Living Arrangements Alone   Patient expects to be discharged to: Apartment     CM met with patient at bedside. Patient lives alone in the [address] and is independent with ADLs. Patient has home CPAP but does not use it, has rollator that she does use daily. Patient is a current .     CM confirmed PCP is NP Eliza Kirkpatrick. Patient's preferred pharmacy is [facility] [address] in [place]. Current dc plan is to go home self care.     Advance Care Planning   Healthcare Decision Maker:    Primary Decision Maker: Joann Faye - Child - [phone]    Click here to complete Healthcare Decision Makers including selection of the Healthcare Decision Maker Relationship (ie \"Primary\").

## 2025-07-21 NOTE — PROGRESS NOTES
4 Eyes Skin Assessment     NAME:  Dayan Lyons  YOB: 1965  MEDICAL RECORD NUMBER:  [id number]    The patient is being assessed for  Admission    I agree that at least one RN has performed a thorough Head to Toe Skin Assessment on the patient. ALL assessment sites listed below have been assessed.      Areas assessed by both nurses:    Head, Face, Ears, Shoulders, Back, Chest, Arms, Elbows, Hands, Sacrum. Buttock, Coccyx, Ischium, and Legs. Feet and Heels        Does the Patient have a Wound? No noted wound(s)       Sage Prevention initiated by RN: Yes  Wound Care Orders initiated by RN: No    For hospital-acquired stage 1 & 2 and ALL Stage 3,4, Unstageable, DTI, NWPT, and Complex wounds: place order “IP Wound Care/Ostomy Nurse Eval and Treat” by RN under : No    New Ostomies, if present place, Ostomy referral order under : No     Nurse 1 eSignature: Electronically signed by PATRICIA Clement RN on 7/21/25 at 4:47 AM EDT    **SHARE this note so that the co-signing nurse can place an eSignature**    Nurse 2 eSignature: Electronically signed by Venita Elias RN on 7/21/25 at 7:43 AM EDT

## 2025-07-21 NOTE — H&P
Department of General Surgery - Adult  Surgical Service    History and Physical        CHIEF COMPLAINT: Abdominal pain and fevers    Reason for Admission: Crohn's colitis, here with flare    History Obtained From:  patient    HISTORY OF PRESENT ILLNESS:    The patient is a 60 y.o. female with significant past medical history of Crohn's colitis, hypertension, hyperlipidemia, depression, AARTI not using CPAP who presents with 1 week of lower abdominal pain and intermittent fevers.  Her symptoms worsened did not respond to Tylenol so she came to the ER for evaluation yesterday.  She denies nausea, emesis.  She has had some nonbloody diarrhea.  Passing flatus as of this morning.  Also endorses weight loss.  No pain with urination, pneumaturia or fecal urea.  Denies history of diverticulitis.    She was first diagnosed with Crohn's disease in her teens.  It is mainly involving her colon.  She has never had surgery related to Crohn's.  She was previously on Humira but stopped that at beginning of the year due to insurance issues.  She was started on Rinvoq in May.  She has had 2 prior flares of Crohn's colitis of the sigmoid colon in 2025.  Her last colonoscopy was 2020 but she is scheduled for 1 in October.  Prior abdominal surgeries include laparoscopic cholecystectomy and right ovary removal.    In the ER, she had a Tmax of 100.4, no tachycardia.  WBC 8, hemoglobin 11, sodium 133, K2.6, creatinine 1, albumin 3.  UA negative.  CT with 3 cm complex pelvic fluid collection compatible with abscess, sigmoid colon inflammation, enlarged left inguinal lymph nodes up to 3 cm in size.    Past Medical History:        Diagnosis Date    Anemia, unspecified 8/20/2011    Autoimmune disease     fibromyalgia    Coagulation defects     anemia    Crohn's disease (HCC)     Depression     Fibromyalgia     GERD (gastroesophageal reflux disease)     Hypercholesterolemia     Hypertension     Musculoskeletal disorder     Other ill-defined  conditions(799.89)     crohns    Psychiatric disorder     depression    Unspecified sleep apnea     cpap     Past Surgical History:        Procedure Laterality Date    CHOLECYSTECTOMY      COLONOSCOPY N/A 2020    COLONOSCOPY performed by Bandar De Santiago MD at [facility] ENDOSCOPY    OVARY REMOVAL      right       Medications Prior to Admission:   Reviewed and updated.  She takes a baby aspirin daily.  Rinvoq for Crohn's disease.    Allergies:  Penicillins    Social History:   Social History     Tobacco Use    Smoking status: Former     Current packs/day: 0.00     Types: Cigarettes     Quit date: 1998     Years since quittin.9    Smokeless tobacco: Never   Substance Use Topics    Alcohol use: Yes    Drug use: No      Family History:       Problem Relation Age of Onset    Dementia Mother     Hypertension Mother     Heart Disease Father     Hypertension Father     Migraines Mother      REVIEW OF SYSTEMS:    10 systems reviewed and negative except HPI    PHYSICAL EXAM:    VITALS:  BP (!) 95/43   Pulse 65   Temp 97.3 °F (36.3 °C) (Oral)   Resp 16   Ht 1.549 m (5' 1\")   Wt 99.8 kg (220 lb)   SpO2 98%   BMI 41.57 kg/m²   24HR INTAKE/OUTPUT:  No intake or output data in the 24 hours ending 25 0940  Nontoxic-appearing  Normocephalic atraumatic  No scleral icterus  Trachea midline  Normal work of breathing on room air  Abdomen is soft, nondistended.  Tender to deep palpation in the lower abdomen, left greater than right.  No rebound or guarding.  Well-healed surgical scars.  Extremities warm and well-perfused  No jaundice  Grossly neuro intact  Affect appropriate    DATA:  Reviewed    ASSESSMENT AND PLAN:    Principal Problem:    Abdominal visceral abscess (HCC)  Plan: This is a 60-year-old female with history of Crohn's colitis who presents with a flare and the small pelvic abscess measuring 3 cm in size.  Abscess is too small for percutaneous drainage at this time.     Admit to surgery  IV Cipro

## 2025-07-21 NOTE — ED TRIAGE NOTES
Patient states she has been having a fever on and off for the past week getting up to 102 degrees. She states that the fever will go away then return. Complaints of trouble urinating.

## 2025-07-21 NOTE — CONSULTS
Gastroenterology Consult     Referring Physician: Eliza Kirkpatrick APRN - [facility]     Consult Date: 2025     Subjective:     Chief Complaint: Left-sided abdominal pain    History of Present Illness: Dayan Lyons is a 60 y.o. female who is seen in consultation for abdominal abscess.  Patient was admitted to the hospital with left-sided abdominal pain with associated abscess in the abdomen probably related to sigmoid diverticulosis.  She also has a history of Crohn's disease and was recently started on Rinvoq prior to Rinvoq she was taking Humira however she had issues with her insurance and was switched to new medication.  She denies any blood in the stool she had fever yesterday however she says she has not felt feverish today.  She was diagnosed with Crohn's disease 20 years ago.  She follows up with a gastroenterologist in the community        Past Medical History:   Diagnosis Date    Anemia, unspecified 2011    Autoimmune disease     fibromyalgia    Coagulation defects     anemia    Crohn's disease (HCC)     Depression     Fibromyalgia     GERD (gastroesophageal reflux disease)     Hypercholesterolemia     Hypertension     Musculoskeletal disorder     Other ill-defined conditions(799.89)     crohns    Psychiatric disorder     depression    Unspecified sleep apnea     cpap     Past Surgical History:   Procedure Laterality Date    CHOLECYSTECTOMY  -    COLONOSCOPY N/A 2020    COLONOSCOPY performed by Bandar De Santiago MD at [facility] ENDOSCOPY    OVARY REMOVAL      right      Family History   Problem Relation Age of Onset    Dementia Mother     Hypertension Mother     Heart Disease Father     Hypertension Father     Migraines Mother      Social History     Tobacco Use    Smoking status: Former     Current packs/day: 0.00     Types: Cigarettes     Quit date: 1998     Years since quittin.9    Smokeless tobacco: Never   Substance Use Topics    Alcohol use: Yes      Allergies   Allergen Reactions

## 2025-07-21 NOTE — PROGRESS NOTES
Pt potassium at 2.6 ,in the ED pt received 40Emq Hospitalist consulted ordered *3 runs of IV then repeat potassium levels.   Pt sling bag noted to have bed bugs  all her belongings packed in blue bag pt reassured and put on contact precaution.

## 2025-07-22 LAB
ANION GAP SERPL CALC-SCNC: 7 MMOL/L (ref 2–12)
BASOPHILS # BLD: 0.01 K/UL (ref 0–0.1)
BASOPHILS NFR BLD: 0.1 % (ref 0–1)
BUN SERPL-MCNC: 13 MG/DL (ref 6–20)
BUN/CREAT SERPL: 14 (ref 12–20)
CA-I BLD-MCNC: 8.2 MG/DL (ref 8.5–10.1)
CHLORIDE SERPL-SCNC: 106 MMOL/L (ref 97–108)
CO2 SERPL-SCNC: 22 MMOL/L (ref 21–32)
CREAT SERPL-MCNC: 0.9 MG/DL (ref 0.55–1.02)
DIFFERENTIAL METHOD BLD: ABNORMAL
EOSINOPHIL # BLD: 0.15 K/UL (ref 0–0.4)
EOSINOPHIL NFR BLD: 2.1 % (ref 0–7)
ERYTHROCYTE [DISTWIDTH] IN BLOOD BY AUTOMATED COUNT: 14.1 % (ref 11.5–14.5)
GLUCOSE SERPL-MCNC: 123 MG/DL (ref 65–100)
HCT VFR BLD AUTO: 27 % (ref 35–47)
HGB BLD-MCNC: 8.7 G/DL (ref 11.5–16)
IMM GRANULOCYTES # BLD AUTO: 0.04 K/UL (ref 0–0.04)
IMM GRANULOCYTES NFR BLD AUTO: 0.5 % (ref 0–0.5)
LYMPHOCYTES # BLD: 0.46 K/UL (ref 0.8–3.5)
LYMPHOCYTES NFR BLD: 6.3 % (ref 12–49)
MCH RBC QN AUTO: 29.4 PG (ref 26–34)
MCHC RBC AUTO-ENTMCNC: 32.2 G/DL (ref 30–36.5)
MCV RBC AUTO: 91.2 FL (ref 80–99)
MONOCYTES # BLD: 0.41 K/UL (ref 0–1)
MONOCYTES NFR BLD: 5.6 % (ref 5–13)
NEUTS SEG # BLD: 6.23 K/UL (ref 1.8–8)
NEUTS SEG NFR BLD: 85.4 % (ref 32–75)
NRBC # BLD: 0 K/UL (ref 0–0.01)
NRBC BLD-RTO: 0 PER 100 WBC
PLATELET # BLD AUTO: 193 K/UL (ref 150–400)
PMV BLD AUTO: 11.9 FL (ref 8.9–12.9)
POTASSIUM SERPL-SCNC: 4 MMOL/L (ref 3.5–5.1)
RBC # BLD AUTO: 2.96 M/UL (ref 3.8–5.2)
SODIUM SERPL-SCNC: 135 MMOL/L (ref 136–145)
WBC # BLD AUTO: 7.3 K/UL (ref 3.6–11)

## 2025-07-22 PROCEDURE — 6370000000 HC RX 637 (ALT 250 FOR IP): Performed by: STUDENT IN AN ORGANIZED HEALTH CARE EDUCATION/TRAINING PROGRAM

## 2025-07-22 PROCEDURE — 2500000003 HC RX 250 WO HCPCS: Performed by: SURGERY

## 2025-07-22 PROCEDURE — 97161 PT EVAL LOW COMPLEX 20 MIN: CPT

## 2025-07-22 PROCEDURE — 80048 BASIC METABOLIC PNL TOTAL CA: CPT

## 2025-07-22 PROCEDURE — 94761 N-INVAS EAR/PLS OXIMETRY MLT: CPT

## 2025-07-22 PROCEDURE — 97530 THERAPEUTIC ACTIVITIES: CPT

## 2025-07-22 PROCEDURE — 6370000000 HC RX 637 (ALT 250 FOR IP): Performed by: SURGERY

## 2025-07-22 PROCEDURE — 6360000002 HC RX W HCPCS: Performed by: SURGERY

## 2025-07-22 PROCEDURE — 2500000003 HC RX 250 WO HCPCS: Performed by: STUDENT IN AN ORGANIZED HEALTH CARE EDUCATION/TRAINING PROGRAM

## 2025-07-22 PROCEDURE — 1100000000 HC RM PRIVATE

## 2025-07-22 PROCEDURE — 97535 SELF CARE MNGMENT TRAINING: CPT

## 2025-07-22 PROCEDURE — 85025 COMPLETE CBC W/AUTO DIFF WBC: CPT

## 2025-07-22 PROCEDURE — 99231 SBSQ HOSP IP/OBS SF/LOW 25: CPT | Performed by: STUDENT IN AN ORGANIZED HEALTH CARE EDUCATION/TRAINING PROGRAM

## 2025-07-22 PROCEDURE — 2580000003 HC RX 258: Performed by: STUDENT IN AN ORGANIZED HEALTH CARE EDUCATION/TRAINING PROGRAM

## 2025-07-22 PROCEDURE — 36415 COLL VENOUS BLD VENIPUNCTURE: CPT

## 2025-07-22 PROCEDURE — 97165 OT EVAL LOW COMPLEX 30 MIN: CPT

## 2025-07-22 RX ORDER — SODIUM CHLORIDE, SODIUM LACTATE, POTASSIUM CHLORIDE, AND CALCIUM CHLORIDE .6; .31; .03; .02 G/100ML; G/100ML; G/100ML; G/100ML
1000 INJECTION, SOLUTION INTRAVENOUS ONCE
Status: COMPLETED | OUTPATIENT
Start: 2025-07-22 | End: 2025-07-22

## 2025-07-22 RX ADMIN — METRONIDAZOLE 500 MG: 500 INJECTION, SOLUTION INTRAVENOUS at 00:13

## 2025-07-22 RX ADMIN — SODIUM CHLORIDE, PRESERVATIVE FREE 10 ML: 5 INJECTION INTRAVENOUS at 22:11

## 2025-07-22 RX ADMIN — FLUOXETINE HYDROCHLORIDE 40 MG: 20 CAPSULE ORAL at 09:43

## 2025-07-22 RX ADMIN — HYDROMORPHONE HYDROCHLORIDE 0.5 MG: 1 INJECTION, SOLUTION INTRAMUSCULAR; INTRAVENOUS; SUBCUTANEOUS at 23:02

## 2025-07-22 RX ADMIN — POTASSIUM CHLORIDE 40 MEQ: 1500 TABLET, EXTENDED RELEASE ORAL at 06:09

## 2025-07-22 RX ADMIN — SODIUM CHLORIDE, SODIUM LACTATE, POTASSIUM CHLORIDE, AND CALCIUM CHLORIDE 1000 ML: .6; .31; .03; .02 INJECTION, SOLUTION INTRAVENOUS at 12:37

## 2025-07-22 RX ADMIN — ACETAMINOPHEN 650 MG: 325 TABLET ORAL at 06:08

## 2025-07-22 RX ADMIN — METRONIDAZOLE 500 MG: 500 INJECTION, SOLUTION INTRAVENOUS at 17:23

## 2025-07-22 RX ADMIN — ROSUVASTATIN CALCIUM 40 MG: 20 TABLET, FILM COATED ORAL at 22:10

## 2025-07-22 RX ADMIN — CIPROFLOXACIN 400 MG: 2 INJECTION, SOLUTION INTRAVENOUS at 00:11

## 2025-07-22 RX ADMIN — SODIUM CHLORIDE, PRESERVATIVE FREE 10 ML: 5 INJECTION INTRAVENOUS at 09:44

## 2025-07-22 RX ADMIN — ACETAMINOPHEN 650 MG: 325 TABLET ORAL at 00:10

## 2025-07-22 RX ADMIN — HYDROMORPHONE HYDROCHLORIDE 0.25 MG: 1 INJECTION, SOLUTION INTRAMUSCULAR; INTRAVENOUS; SUBCUTANEOUS at 09:42

## 2025-07-22 RX ADMIN — ACETAMINOPHEN 650 MG: 325 TABLET ORAL at 17:28

## 2025-07-22 RX ADMIN — HYDROMORPHONE HYDROCHLORIDE 0.5 MG: 1 INJECTION, SOLUTION INTRAMUSCULAR; INTRAVENOUS; SUBCUTANEOUS at 06:12

## 2025-07-22 RX ADMIN — ASPIRIN 81 MG: 81 TABLET, CHEWABLE ORAL at 09:44

## 2025-07-22 RX ADMIN — Medication 2000 UNITS: at 09:44

## 2025-07-22 RX ADMIN — PANTOPRAZOLE SODIUM 40 MG: 40 TABLET, DELAYED RELEASE ORAL at 06:08

## 2025-07-22 RX ADMIN — DEXTROSE MONOHYDRATE, SODIUM CHLORIDE, AND POTASSIUM CHLORIDE: 50; 1.49; 4.5 INJECTION, SOLUTION INTRAVENOUS at 15:00

## 2025-07-22 RX ADMIN — ENOXAPARIN SODIUM 40 MG: 100 INJECTION SUBCUTANEOUS at 09:43

## 2025-07-22 RX ADMIN — POLYETHYLENE GLYCOL 3350 17 G: 17 POWDER, FOR SOLUTION ORAL at 09:43

## 2025-07-22 RX ADMIN — METRONIDAZOLE 500 MG: 500 INJECTION, SOLUTION INTRAVENOUS at 09:52

## 2025-07-22 RX ADMIN — ACETAMINOPHEN 650 MG: 325 TABLET ORAL at 12:40

## 2025-07-22 RX ADMIN — HYDROMORPHONE HYDROCHLORIDE 0.5 MG: 1 INJECTION, SOLUTION INTRAMUSCULAR; INTRAVENOUS; SUBCUTANEOUS at 15:39

## 2025-07-22 RX ADMIN — CIPROFLOXACIN 400 MG: 2 INJECTION, SOLUTION INTRAVENOUS at 12:38

## 2025-07-22 RX ADMIN — METOPROLOL TARTRATE 50 MG: 50 TABLET, FILM COATED ORAL at 09:44

## 2025-07-22 RX ADMIN — SODIUM CHLORIDE, SODIUM LACTATE, POTASSIUM CHLORIDE, AND CALCIUM CHLORIDE 1000 ML: .6; .31; .03; .02 INJECTION, SOLUTION INTRAVENOUS at 18:33

## 2025-07-22 ASSESSMENT — PAIN SCALES - GENERAL
PAINLEVEL_OUTOF10: 3
PAINLEVEL_OUTOF10: 7
PAINLEVEL_OUTOF10: 0
PAINLEVEL_OUTOF10: 3
PAINLEVEL_OUTOF10: 6
PAINLEVEL_OUTOF10: 3
PAINLEVEL_OUTOF10: 2
PAINLEVEL_OUTOF10: 7
PAINLEVEL_OUTOF10: 9
PAINLEVEL_OUTOF10: 0

## 2025-07-22 ASSESSMENT — PAIN DESCRIPTION - LOCATION
LOCATION: ABDOMEN

## 2025-07-22 ASSESSMENT — PAIN SCALES - WONG BAKER
WONGBAKER_NUMERICALRESPONSE: NO HURT
WONGBAKER_NUMERICALRESPONSE: NO HURT

## 2025-07-22 ASSESSMENT — PAIN DESCRIPTION - DESCRIPTORS
DESCRIPTORS: SHOOTING
DESCRIPTORS: ACHING;CRAMPING;NAGGING
DESCRIPTORS: ACHING

## 2025-07-22 ASSESSMENT — PAIN - FUNCTIONAL ASSESSMENT
PAIN_FUNCTIONAL_ASSESSMENT: ACTIVITIES ARE NOT PREVENTED
PAIN_FUNCTIONAL_ASSESSMENT: ACTIVITIES ARE NOT PREVENTED

## 2025-07-22 ASSESSMENT — PAIN DESCRIPTION - ORIENTATION
ORIENTATION: ANTERIOR
ORIENTATION: ANTERIOR
ORIENTATION: LOWER

## 2025-07-22 NOTE — PROGRESS NOTES
Department of General Surgery - Adult  Surgical Service    Progress Note      SUBJECTIVE:  Afebrile, WBC normal. Having increased abdominal pain. +flatus and BM. No nausea.     OBJECTIVE      Physical    VITALS:  BP (!) 103/49   Pulse 76   Temp 98.1 °F (36.7 °C) (Oral)   Resp 19   Ht 1.549 m (5' 1\")   Wt 100.2 kg (220 lb 14.4 oz)   SpO2 96%   BMI 41.74 kg/m²   INTAKE/OUTPUT:    Intake/Output Summary (Last 24 hours) at 7/22/2025 1428  Last data filed at 7/21/2025 2055  Gross per 24 hour   Intake 10 ml   Output 600 ml   Net -590 ml     NAD  NCAT  No scleral icterus  Trachea midline  Normal WOB on RA  Abd soft, tender to deep palpation in LUQ, LLQ, RLQ. No rebound or guarding.  Ext WWP  Grossly neuro intact  No jaundice  Affect appropriate     Data  Reviewed.     Current Inpatient Medications    Reviewed.     ASSESSMENT AND PLAN    This is a 60-year-old female with history of Crohn's colitis who presents with a flare vs sigmoid diverticulitis and has a small pelvic abscess measuring 3 cm in size.  Abscess is too small for percutaneous drainage.      IV Cipro and Flagyl  GI consult  Diet as tolerated  Home meds reordered  Potassium replacement for hypokalemia  Serial abdominal exams  DVT prophylaxis  Repeat CT in 24-48h if not improved    Laila Pascual MD   July 22, 2025

## 2025-07-22 NOTE — THERAPY EVALUATION
OCCUPATIONAL THERAPY EVALUATION AND DISCHARGE  Patient: Dayan Lyons (60 y.o. female)  Date: 7/22/2025  Primary Diagnosis: Abdominal visceral abscess (HCC) [K65.1]  Diverticulitis [K57.92]  Diverticulitis of large intestine with abscess without bleeding [K57.20]       Precautions: General Precautions, Contact Precautions                Recommendations for nursing mobility: Out of bed to chair for meals, Amb in hallway, and ambulate to bathroom with or without device    In place during session:Peripheral IV and External Catheter  ASSESSMENT  Pt is a 60 y.o. female presenting to [facility] with c/o abdominal pain and trouble urinating, admitted 7/20/25 and currently being treated for abdominal visceral abscess. Pt received semi-supine in bed upon arrival, AXO x4, and agreeable to OT evaluation.     Based on the objective data described below pt currently present at baseline level of function for ADLs and function mobility/transfers at this time. (See below for objective details and assist levels).     Overall pt tolerated session well today with patient reporting pain at 7/10 in abdomen. Pt requested to go to bathroom when therapist arrived. Pt able to ambulate to bathroom with no device and this writer managing the IV pole. Pt able to complete toileting x2 IND. Pt completed hand hygiene and oral care while standing at the sink side.  Pt has no skilled acute OT needs at this time either noted by OT or reported by pt, will DC skilled OT following evaluation; pt verbalized understanding and agreement.  Current OT DC recommendation No post acute skilled occupational therapy, return to previous living settingonce medically appropriate.     Start of Session End of Session   SPO2 (%) 95 98   Heart Rate (BPM) 80 85        PLAN :  Recommendations and Planned Interventions: DC from skilled OT services following evaluation.     Rationale for discharge: Patient currently at functional baseline for transfers/mobility, no further  Alone  Type of Home: Apartment  Home Layout:  (pt lives on 3rd floor, building has elevator)  Home Access: Level entry  Bathroom Shower/Tub: Tub/Shower unit, Shower chair with back  Bathroom Toilet: Handicap height  Bathroom Equipment: Grab bars in shower, Shower chair  Home Equipment: Rollator (Pt has CPAP but does not use it)  Has the patient had two or more falls in the past year or any fall with injury in the past year?: No  Receives Help From: Family  Prior Level of Assist for ADLs: Independent  Prior Level of Assist for Homemaking: Independent  Homemaking Responsibilities: Yes  Prior Level of Assist for Ambulation: Independent household ambulator, with or without device  Prior Level of Assist for Transfers: Independent  Active : Yes  Mode of Transportation: Car  Occupation: On disability      Hand Dominance: left     EXAMINATION OF PERFORMANCE DEFICITS:    Cognitive/Behavioral Status:  Orientation  Overall Orientation Status: Within Normal Limits  Orientation Level: Oriented X4  Cognition  Overall Cognitive Status: WNL    Skin: intact    Edema: none    Hearing:   Hearing  Hearing: Within functional limits  Hearing: Within functional limits    Vision/Perceptual:             Vision  Vision: Impaired  Vision Exceptions: Wears glasses for distance;Wears glasses for reading   Vision: Impaired    Range of Motion:   AROM: Within functional limits       Strength:  Strength: Within functional limits    Coordination:  Coordination: Within functional limits            Tone & Sensation:   Tone: Normal  Sensation: Intact      Functional Mobility and Transfers for ADLs:  Bed Mobility:  Bed Mobility Training  Bed Mobility Training: Yes  Overall Level of Assistance: Modified independent  Interventions: Safety awareness training;Verbal cues  Rolling: Modified independent  Supine to Sit: Modified independent  Sit to Supine: Modified independent  Scooting: Independent    Transfers:  Transfer Training  Transfer Training:

## 2025-07-22 NOTE — PLAN OF CARE
Problem: Pain  Goal: Verbalizes/displays adequate comfort level or baseline comfort level  7/21/2025 2001 by PATRICIA Clement, RN  Outcome: Progressing  7/21/2025 1409 by Nanette Oviedo, RN  Outcome: Progressing     Problem: Skin/Tissue Integrity  Goal: Skin integrity remains intact  Description: 1.  Monitor for areas of redness and/or skin breakdown  2.  Assess vascular access sites hourly  3.  Every 4-6 hours minimum:  Change oxygen saturation probe site  4.  Every 4-6 hours:  If on nasal continuous positive airway pressure, respiratory therapy assess nares and determine need for appliance change or resting period  7/21/2025 2001 by PATRICIA Clement, RN  Outcome: Progressing  7/21/2025 1409 by Nanette Oviedo, RN  Outcome: Progressing  Flowsheets (Taken 7/21/2025 0832)  Skin Integrity Remains Intact: Monitor for areas of redness and/or skin breakdown

## 2025-07-22 NOTE — PLAN OF CARE
PHYSICAL THERAPY EVALUATION AND DISCHARGE  Patient: Dayan Lyons (60 y.o. female)  Date: 7/22/2025  Primary Diagnosis: Abdominal visceral abscess (HCC) [K65.1]  Diverticulitis [K57.92]  Diverticulitis of large intestine with abscess without bleeding [K57.20]       Precautions:                        Recommendations for nursing mobility: Encourage HEP in prep for ADLs/mobility; see handout for details, Frequent repositioning to prevent skin breakdown, LE elevation for management of edema, and Amb to bathroom with AD and gait belt    In place during session: Peripheral IV, External Catheter, and EKG/telemetry     ASSESSMENT  Pt is a 60 y.o. female admitted on 7/20/2025 for abd visceral abscess; pt currently being treated for diverticulitis . Pt semi supine upon PT arrival, agreeable to evaluation. Pt A&O x 4.     Based on the objective data described below pt currently present at baseline indep for transfers and mobility at this time. (See below for objective details and assist levels).     Overall pt tolerated session good today with PT.  Patient ambulated to bathroom ,indep in self care and ambulated to sink and out to the bed with sup.some sob,patient was encouraged to do more activities to get more endurance.Pt has no skilled acute PT needs at this time noted by PT or reported by pt, will DC skilled PT following evaluation; pt verbalized understanding and agreement.  Current PT DC recommendation No post acute skilled physical therapy recommended at this time,   once medically appropriate.         PLAN :  Recommendations and Planned Interventions: DC from skilled PT services following evaluation.     Rationale for discharge: Patient currently at functional baseline for transfers/mobility, no further skilled therapy required at this time    Frequency/Duration: DC from services following evaluation due to pt being at functional baseline; no skilled therapy required during admission, please reorder if

## 2025-07-22 NOTE — CARE COORDINATION
CM reviewed chart. Patient continues on IV ABX. GI, Surgery, PT/OT following.     DCP: Home self care once clinically stable.     CM will continue to follow.

## 2025-07-23 ENCOUNTER — APPOINTMENT (OUTPATIENT)
Facility: HOSPITAL | Age: 60
End: 2025-07-23
Payer: MEDICARE

## 2025-07-23 LAB
ANION GAP SERPL CALC-SCNC: 10 MMOL/L (ref 2–12)
BUN SERPL-MCNC: 6 MG/DL (ref 6–20)
BUN/CREAT SERPL: 9 (ref 12–20)
CA-I BLD-MCNC: 8.6 MG/DL (ref 8.5–10.1)
CHLORIDE SERPL-SCNC: 107 MMOL/L (ref 97–108)
CO2 SERPL-SCNC: 20 MMOL/L (ref 21–32)
CREAT SERPL-MCNC: 0.68 MG/DL (ref 0.55–1.02)
ERYTHROCYTE [DISTWIDTH] IN BLOOD BY AUTOMATED COUNT: 14.5 % (ref 11.5–14.5)
GLUCOSE SERPL-MCNC: 123 MG/DL (ref 65–100)
HCT VFR BLD AUTO: 27 % (ref 35–47)
HGB BLD-MCNC: 8.6 G/DL (ref 11.5–16)
MCH RBC QN AUTO: 29.4 PG (ref 26–34)
MCHC RBC AUTO-ENTMCNC: 31.9 G/DL (ref 30–36.5)
MCV RBC AUTO: 92.2 FL (ref 80–99)
NRBC # BLD: 0 K/UL (ref 0–0.01)
NRBC BLD-RTO: 0 PER 100 WBC
PLATELET # BLD AUTO: 194 K/UL (ref 150–400)
PMV BLD AUTO: 11.7 FL (ref 8.9–12.9)
POTASSIUM SERPL-SCNC: 3.9 MMOL/L (ref 3.5–5.1)
RBC # BLD AUTO: 2.93 M/UL (ref 3.8–5.2)
SODIUM SERPL-SCNC: 137 MMOL/L (ref 136–145)
WBC # BLD AUTO: 5.9 K/UL (ref 3.6–11)

## 2025-07-23 PROCEDURE — 36415 COLL VENOUS BLD VENIPUNCTURE: CPT

## 2025-07-23 PROCEDURE — 2500000003 HC RX 250 WO HCPCS: Performed by: SURGERY

## 2025-07-23 PROCEDURE — 74177 CT ABD & PELVIS W/CONTRAST: CPT

## 2025-07-23 PROCEDURE — 6370000000 HC RX 637 (ALT 250 FOR IP): Performed by: SURGERY

## 2025-07-23 PROCEDURE — 6360000004 HC RX CONTRAST MEDICATION: Performed by: STUDENT IN AN ORGANIZED HEALTH CARE EDUCATION/TRAINING PROGRAM

## 2025-07-23 PROCEDURE — 2500000003 HC RX 250 WO HCPCS: Performed by: STUDENT IN AN ORGANIZED HEALTH CARE EDUCATION/TRAINING PROGRAM

## 2025-07-23 PROCEDURE — 6360000002 HC RX W HCPCS: Performed by: SURGERY

## 2025-07-23 PROCEDURE — 99231 SBSQ HOSP IP/OBS SF/LOW 25: CPT | Performed by: STUDENT IN AN ORGANIZED HEALTH CARE EDUCATION/TRAINING PROGRAM

## 2025-07-23 PROCEDURE — 6370000000 HC RX 637 (ALT 250 FOR IP): Performed by: STUDENT IN AN ORGANIZED HEALTH CARE EDUCATION/TRAINING PROGRAM

## 2025-07-23 PROCEDURE — 6360000002 HC RX W HCPCS: Performed by: STUDENT IN AN ORGANIZED HEALTH CARE EDUCATION/TRAINING PROGRAM

## 2025-07-23 PROCEDURE — 80048 BASIC METABOLIC PNL TOTAL CA: CPT

## 2025-07-23 PROCEDURE — 94761 N-INVAS EAR/PLS OXIMETRY MLT: CPT

## 2025-07-23 PROCEDURE — 85027 COMPLETE CBC AUTOMATED: CPT

## 2025-07-23 PROCEDURE — 1100000000 HC RM PRIVATE

## 2025-07-23 RX ORDER — HYDROMORPHONE HYDROCHLORIDE 1 MG/ML
0.25 INJECTION, SOLUTION INTRAMUSCULAR; INTRAVENOUS; SUBCUTANEOUS
Refills: 0 | Status: DISPENSED | OUTPATIENT
Start: 2025-07-23 | End: 2025-07-25

## 2025-07-23 RX ORDER — DIATRIZOATE MEGLUMINE AND DIATRIZOATE SODIUM 660; 100 MG/ML; MG/ML
30 SOLUTION ORAL; RECTAL
Status: DISCONTINUED | OUTPATIENT
Start: 2025-07-23 | End: 2025-08-01 | Stop reason: HOSPADM

## 2025-07-23 RX ORDER — HYDROMORPHONE HYDROCHLORIDE 1 MG/ML
0.5 INJECTION, SOLUTION INTRAMUSCULAR; INTRAVENOUS; SUBCUTANEOUS
Refills: 0 | Status: DISPENSED | OUTPATIENT
Start: 2025-07-23 | End: 2025-07-25

## 2025-07-23 RX ORDER — IOPAMIDOL 755 MG/ML
100 INJECTION, SOLUTION INTRAVASCULAR
Status: COMPLETED | OUTPATIENT
Start: 2025-07-23 | End: 2025-07-23

## 2025-07-23 RX ADMIN — IOPAMIDOL 100 ML: 755 INJECTION, SOLUTION INTRAVENOUS at 11:27

## 2025-07-23 RX ADMIN — FLUOXETINE HYDROCHLORIDE 40 MG: 20 CAPSULE ORAL at 09:46

## 2025-07-23 RX ADMIN — METRONIDAZOLE 500 MG: 500 INJECTION, SOLUTION INTRAVENOUS at 16:32

## 2025-07-23 RX ADMIN — ONDANSETRON 4 MG: 2 INJECTION, SOLUTION INTRAMUSCULAR; INTRAVENOUS at 10:12

## 2025-07-23 RX ADMIN — ACETAMINOPHEN 650 MG: 325 TABLET ORAL at 06:22

## 2025-07-23 RX ADMIN — PANTOPRAZOLE SODIUM 40 MG: 40 TABLET, DELAYED RELEASE ORAL at 06:22

## 2025-07-23 RX ADMIN — ASPIRIN 81 MG: 81 TABLET, CHEWABLE ORAL at 09:46

## 2025-07-23 RX ADMIN — HYDROMORPHONE HYDROCHLORIDE 0.5 MG: 1 INJECTION, SOLUTION INTRAMUSCULAR; INTRAVENOUS; SUBCUTANEOUS at 21:07

## 2025-07-23 RX ADMIN — HYDROCHLOROTHIAZIDE 25 MG: 25 TABLET ORAL at 09:46

## 2025-07-23 RX ADMIN — ONDANSETRON 4 MG: 2 INJECTION, SOLUTION INTRAMUSCULAR; INTRAVENOUS at 04:28

## 2025-07-23 RX ADMIN — ACETAMINOPHEN 650 MG: 325 TABLET ORAL at 00:41

## 2025-07-23 RX ADMIN — DEXTROSE MONOHYDRATE, SODIUM CHLORIDE, AND POTASSIUM CHLORIDE: 50; 1.49; 4.5 INJECTION, SOLUTION INTRAVENOUS at 06:25

## 2025-07-23 RX ADMIN — ROSUVASTATIN CALCIUM 40 MG: 20 TABLET, FILM COATED ORAL at 21:06

## 2025-07-23 RX ADMIN — ENOXAPARIN SODIUM 40 MG: 100 INJECTION SUBCUTANEOUS at 10:00

## 2025-07-23 RX ADMIN — ACETAMINOPHEN 650 MG: 325 TABLET ORAL at 16:33

## 2025-07-23 RX ADMIN — ACETAMINOPHEN 650 MG: 325 TABLET ORAL at 13:12

## 2025-07-23 RX ADMIN — METRONIDAZOLE 500 MG: 500 INJECTION, SOLUTION INTRAVENOUS at 09:59

## 2025-07-23 RX ADMIN — METRONIDAZOLE 500 MG: 500 INJECTION, SOLUTION INTRAVENOUS at 00:47

## 2025-07-23 RX ADMIN — SODIUM CHLORIDE, PRESERVATIVE FREE 10 ML: 5 INJECTION INTRAVENOUS at 09:46

## 2025-07-23 RX ADMIN — Medication 2000 UNITS: at 09:46

## 2025-07-23 RX ADMIN — DEXTROSE MONOHYDRATE, SODIUM CHLORIDE, AND POTASSIUM CHLORIDE: 50; 1.49; 4.5 INJECTION, SOLUTION INTRAVENOUS at 21:16

## 2025-07-23 RX ADMIN — CIPROFLOXACIN 400 MG: 2 INJECTION, SOLUTION INTRAVENOUS at 01:49

## 2025-07-23 RX ADMIN — HYDROMORPHONE HYDROCHLORIDE 0.25 MG: 1 INJECTION, SOLUTION INTRAMUSCULAR; INTRAVENOUS; SUBCUTANEOUS at 10:13

## 2025-07-23 RX ADMIN — DIATRIZOATE MEGLUMINE AND DIATRIZOATE SODIUM 30 ML: 660; 100 LIQUID ORAL; RECTAL at 10:12

## 2025-07-23 RX ADMIN — SODIUM CHLORIDE, PRESERVATIVE FREE 10 ML: 5 INJECTION INTRAVENOUS at 21:07

## 2025-07-23 RX ADMIN — CIPROFLOXACIN 400 MG: 2 INJECTION, SOLUTION INTRAVENOUS at 13:12

## 2025-07-23 ASSESSMENT — PAIN DESCRIPTION - ORIENTATION
ORIENTATION: ANTERIOR
ORIENTATION: ANTERIOR
ORIENTATION: LOWER

## 2025-07-23 ASSESSMENT — PAIN SCALES - GENERAL
PAINLEVEL_OUTOF10: 8
PAINLEVEL_OUTOF10: 0
PAINLEVEL_OUTOF10: 7
PAINLEVEL_OUTOF10: 3

## 2025-07-23 ASSESSMENT — PAIN DESCRIPTION - LOCATION
LOCATION: ABDOMEN

## 2025-07-23 ASSESSMENT — PAIN DESCRIPTION - DESCRIPTORS
DESCRIPTORS: ACHING
DESCRIPTORS: CRAMPING;ACHING
DESCRIPTORS: ACHING

## 2025-07-23 ASSESSMENT — PAIN SCALES - WONG BAKER: WONGBAKER_NUMERICALRESPONSE: NO HURT

## 2025-07-23 ASSESSMENT — PAIN - FUNCTIONAL ASSESSMENT: PAIN_FUNCTIONAL_ASSESSMENT: ACTIVITIES ARE NOT PREVENTED

## 2025-07-23 NOTE — CARE COORDINATION
CM reviewed chart. Patient continues on IV ABX. Noted order for repeat CT w/contrast.     PT/OT, Surgery, GI following.     Current DCP: home self care once clinically stable.

## 2025-07-23 NOTE — PLAN OF CARE
Problem: Discharge Planning  Goal: Discharge to home or other facility with appropriate resources  7/23/2025 0802 by Airam Tomlinson RN  Outcome: Progressing  7/23/2025 0243 by Brent Espino RN  Outcome: Progressing     Problem: Pain  Goal: Verbalizes/displays adequate comfort level or baseline comfort level  7/23/2025 0802 by Airam Tomlinson RN  Outcome: Progressing  7/23/2025 0243 by Brent Espino RN  Outcome: Progressing     Problem: Skin/Tissue Integrity  Goal: Skin integrity remains intact  Description: 1.  Monitor for areas of redness and/or skin breakdown  2.  Assess vascular access sites hourly  3.  Every 4-6 hours minimum:  Change oxygen saturation probe site  4.  Every 4-6 hours:  If on nasal continuous positive airway pressure, respiratory therapy assess nares and determine need for appliance change or resting period  7/23/2025 0802 by Airam Tomlinson RN  Outcome: Progressing  7/23/2025 0243 by Brent Espino RN  Outcome: Progressing  Flowsheets (Taken 7/22/2025 2000)  Skin Integrity Remains Intact: Monitor for areas of redness and/or skin breakdown     Problem: Safety - Adult  Goal: Free from fall injury  7/23/2025 0802 by Airam Tomlinson RN  Outcome: Progressing  7/23/2025 0243 by Brent Espino RN  Outcome: Progressing

## 2025-07-23 NOTE — PROGRESS NOTES
Department of General Surgery - Adult  Surgical Service    Progress Note      SUBJECTIVE:  Afebrile, WBC normal. Still complaining of worsening abdominal pain and nausea and multiple loose stools, 7 nonbloody BMs past 24 hours. On CLD but not eating much.     OBJECTIVE      Physical    VITALS:  /86   Pulse 66   Temp 97.5 °F (36.4 °C) (Oral)   Resp 19   Ht 1.549 m (5' 1\")   Wt 99.8 kg (220 lb 0.3 oz)   SpO2 99%   BMI 41.57 kg/m²   INTAKE/OUTPUT:    Intake/Output Summary (Last 24 hours) at 7/23/2025 1311  Last data filed at 7/23/2025 0630  Gross per 24 hour   Intake 5631.64 ml   Output 950 ml   Net 4681.64 ml     NAD  NCAT  No scleral icterus  Trachea midline  Normal WOB on RA  Abd soft, tender to deep palpation in LUQ, LLQ, RLQ. No rebound or guarding.  Ext WWP  Grossly neuro intact  No jaundice  Affect appropriate     Data  Reviewed.     Current Inpatient Medications    Reviewed.     ASSESSMENT AND PLAN    This is a 60-year-old female with history of Crohn's colitis who presents with a flare vs sigmoid diverticulitis and has a small pelvic abscess measuring 3 cm in size.  Abscess is too small for percutaneous drainage.      IV Cipro and Flagyl  GI consult  Diet as tolerated  Potassium replacement for hypokalemia  Serial abdominal exams  DVT prophylaxis  Repeat CT today with PO and IV contrast given lack of improvement thus far.     Laila Pascual MD   July 23, 2025

## 2025-07-23 NOTE — PROGRESS NOTES
4 Eyes Skin Assessment     NAME:  Dayan Lyons  YOB: 1965  MEDICAL RECORD NUMBER:  [id number]    The patient is being assessed for  Other dual skin assessment    I agree that at least one RN has performed a thorough Head to Toe Skin Assessment on the patient. ALL assessment sites listed below have been assessed.      Areas assessed by both nurses:    Head, Face, Ears, Shoulders, Back, Chest, Arms, Elbows, Hands, Sacrum. Buttock, Coccyx, Ischium, Legs. Feet and Heels, and Under Medical Devices         Does the Patient have a Wound? No noted wound(s)       Sage Prevention initiated by RN: Yes  Wound Care Orders initiated by RN: No    For hospital-acquired stage 1 & 2 and ALL Stage 3,4, Unstageable, DTI, NWPT, and Complex wounds: place order “IP Wound Care/Ostomy Nurse Eval and Treat” by RN under : No    New Ostomies, if present place, Ostomy referral order under : No     Nurse 1 eSignature: Electronically signed by Airam Tomlinson RN on 7/23/25 at 5:35 PM EDT    **SHARE this note so that the co-signing nurse can place an eSignature**    Nurse 2 eSignature: Electronically signed by Albert Thompson RN on 7/23/25 at 7:00 PM EDT

## 2025-07-23 NOTE — PROGRESS NOTES
Spiritual Health History and Assessment/Progress Note  [facility]    Initial Encounter,  ,  ,      Name: Dayan Lyons MRN: [id number]    Age: 60 y.o.     Sex: female   Language: English   Yazidi: Jain   Abdominal visceral abscess (HCC)     Date: 7/23/2025            Total Time Calculated: 12 min              Spiritual Assessment began in [facility] SURGICAL        Referral/Consult From: Rounding   Encounter Overview/Reason: Initial Encounter  Service Provided For: Patient    Joan, Belief, Meaning:   Patient identifies as spiritual, is connected with a joan tradition or spiritual practice, and has beliefs or practices that help with coping during difficult times  Family/Friends No family/friends present      Importance and Influence:  Patient has spiritual/personal beliefs that influence decisions regarding their health  Family/Friends No family/friends present    Community:  Patient is connected with a spiritual community and feels well-supported. Support system includes: Children and Joan Community  Family/Friends No family/friends present    Assessment and Plan of Care:     Patient Interventions include: Facilitated expression of thoughts and feelings, Explored spiritual coping/struggle/distress, Engaged in theological reflection, Affirmed coping skills/support systems, and Facilitated life review and/ or legacy  Family/Friends Interventions include: No family/friends present    Patient Plan of Care: Spiritual Care available upon further referral  Family/Friends Plan of Care: Spiritual Care available upon further referral     is visiting for an initial spiritual consult with the patient in 516. Dayan shared about her health concerns. She is supported by family- primarily her daughter. She is [facility] [employer] and watches [employer]ism services virtually online due to recent health concerns. Active listening, life review, and prayer provided.     Electronically signed by TONIA TOWNSEND

## 2025-07-23 NOTE — PLAN OF CARE
Problem: Discharge Planning  Goal: Discharge to home or other facility with appropriate resources  Outcome: Progressing     Problem: Pain  Goal: Verbalizes/displays adequate comfort level or baseline comfort level  Outcome: Progressing     Problem: Skin/Tissue Integrity  Goal: Skin integrity remains intact  Description: 1.  Monitor for areas of redness and/or skin breakdown  2.  Assess vascular access sites hourly  3.  Every 4-6 hours minimum:  Change oxygen saturation probe site  4.  Every 4-6 hours:  If on nasal continuous positive airway pressure, respiratory therapy assess nares and determine need for appliance change or resting period  Outcome: Progressing  Flowsheets (Taken 7/22/2025 2000)  Skin Integrity Remains Intact: Monitor for areas of redness and/or skin breakdown     Problem: Safety - Adult  Goal: Free from fall injury  Outcome: Progressing

## 2025-07-24 LAB
ANION GAP SERPL CALC-SCNC: 6 MMOL/L (ref 2–12)
BUN SERPL-MCNC: 1 MG/DL (ref 6–20)
BUN/CREAT SERPL: 1 (ref 12–20)
CA-I BLD-MCNC: 8.6 MG/DL (ref 8.5–10.1)
CHLORIDE SERPL-SCNC: 109 MMOL/L (ref 97–108)
CO2 SERPL-SCNC: 23 MMOL/L (ref 21–32)
CREAT SERPL-MCNC: 0.7 MG/DL (ref 0.55–1.02)
ERYTHROCYTE [DISTWIDTH] IN BLOOD BY AUTOMATED COUNT: 14.5 % (ref 11.5–14.5)
GLUCOSE SERPL-MCNC: 92 MG/DL (ref 65–100)
HCT VFR BLD AUTO: 26.4 % (ref 35–47)
HGB BLD-MCNC: 8.3 G/DL (ref 11.5–16)
MAGNESIUM SERPL-MCNC: 1.4 MG/DL (ref 1.6–2.4)
MCH RBC QN AUTO: 28.8 PG (ref 26–34)
MCHC RBC AUTO-ENTMCNC: 31.4 G/DL (ref 30–36.5)
MCV RBC AUTO: 91.7 FL (ref 80–99)
NRBC # BLD: 0 K/UL (ref 0–0.01)
NRBC BLD-RTO: 0 PER 100 WBC
PHOSPHATE SERPL-MCNC: 2.5 MG/DL (ref 2.6–4.7)
PLATELET # BLD AUTO: 199 K/UL (ref 150–400)
PMV BLD AUTO: 11.4 FL (ref 8.9–12.9)
POTASSIUM SERPL-SCNC: 3.9 MMOL/L (ref 3.5–5.1)
RBC # BLD AUTO: 2.88 M/UL (ref 3.8–5.2)
SODIUM SERPL-SCNC: 138 MMOL/L (ref 136–145)
WBC # BLD AUTO: 5.9 K/UL (ref 3.6–11)

## 2025-07-24 PROCEDURE — 1100000000 HC RM PRIVATE

## 2025-07-24 PROCEDURE — 80048 BASIC METABOLIC PNL TOTAL CA: CPT

## 2025-07-24 PROCEDURE — 85027 COMPLETE CBC AUTOMATED: CPT

## 2025-07-24 PROCEDURE — 99223 1ST HOSP IP/OBS HIGH 75: CPT | Performed by: INTERNAL MEDICINE

## 2025-07-24 PROCEDURE — 6360000002 HC RX W HCPCS: Performed by: SURGERY

## 2025-07-24 PROCEDURE — 84100 ASSAY OF PHOSPHORUS: CPT

## 2025-07-24 PROCEDURE — 6370000000 HC RX 637 (ALT 250 FOR IP): Performed by: STUDENT IN AN ORGANIZED HEALTH CARE EDUCATION/TRAINING PROGRAM

## 2025-07-24 PROCEDURE — 2580000003 HC RX 258: Performed by: STUDENT IN AN ORGANIZED HEALTH CARE EDUCATION/TRAINING PROGRAM

## 2025-07-24 PROCEDURE — 6370000000 HC RX 637 (ALT 250 FOR IP): Performed by: SURGERY

## 2025-07-24 PROCEDURE — 36415 COLL VENOUS BLD VENIPUNCTURE: CPT

## 2025-07-24 PROCEDURE — 94761 N-INVAS EAR/PLS OXIMETRY MLT: CPT

## 2025-07-24 PROCEDURE — 6360000002 HC RX W HCPCS: Performed by: STUDENT IN AN ORGANIZED HEALTH CARE EDUCATION/TRAINING PROGRAM

## 2025-07-24 PROCEDURE — 83735 ASSAY OF MAGNESIUM: CPT

## 2025-07-24 PROCEDURE — 2500000003 HC RX 250 WO HCPCS: Performed by: SURGERY

## 2025-07-24 RX ORDER — SODIUM CHLORIDE, SODIUM LACTATE, POTASSIUM CHLORIDE, AND CALCIUM CHLORIDE .6; .31; .03; .02 G/100ML; G/100ML; G/100ML; G/100ML
1000 INJECTION, SOLUTION INTRAVENOUS ONCE
Status: COMPLETED | OUTPATIENT
Start: 2025-07-24 | End: 2025-07-24

## 2025-07-24 RX ADMIN — HYDROMORPHONE HYDROCHLORIDE 0.5 MG: 1 INJECTION, SOLUTION INTRAMUSCULAR; INTRAVENOUS; SUBCUTANEOUS at 18:19

## 2025-07-24 RX ADMIN — METRONIDAZOLE 500 MG: 500 INJECTION, SOLUTION INTRAVENOUS at 10:13

## 2025-07-24 RX ADMIN — ENOXAPARIN SODIUM 40 MG: 100 INJECTION SUBCUTANEOUS at 10:13

## 2025-07-24 RX ADMIN — ACETAMINOPHEN 650 MG: 325 TABLET ORAL at 13:11

## 2025-07-24 RX ADMIN — ASPIRIN 81 MG: 81 TABLET, CHEWABLE ORAL at 10:14

## 2025-07-24 RX ADMIN — ROSUVASTATIN CALCIUM 40 MG: 20 TABLET, FILM COATED ORAL at 21:41

## 2025-07-24 RX ADMIN — METOPROLOL TARTRATE 50 MG: 50 TABLET, FILM COATED ORAL at 10:13

## 2025-07-24 RX ADMIN — Medication 2000 UNITS: at 10:13

## 2025-07-24 RX ADMIN — METRONIDAZOLE 500 MG: 500 INJECTION, SOLUTION INTRAVENOUS at 01:00

## 2025-07-24 RX ADMIN — HYDROMORPHONE HYDROCHLORIDE 0.25 MG: 1 INJECTION, SOLUTION INTRAMUSCULAR; INTRAVENOUS; SUBCUTANEOUS at 02:20

## 2025-07-24 RX ADMIN — MEROPENEM 1000 MG: 1 INJECTION INTRAVENOUS at 18:21

## 2025-07-24 RX ADMIN — CIPROFLOXACIN 400 MG: 2 INJECTION, SOLUTION INTRAVENOUS at 02:07

## 2025-07-24 RX ADMIN — FLUOXETINE HYDROCHLORIDE 40 MG: 20 CAPSULE ORAL at 10:13

## 2025-07-24 RX ADMIN — SODIUM CHLORIDE, PRESERVATIVE FREE 10 ML: 5 INJECTION INTRAVENOUS at 10:13

## 2025-07-24 RX ADMIN — ACETAMINOPHEN 650 MG: 325 TABLET ORAL at 00:57

## 2025-07-24 RX ADMIN — MEROPENEM 1000 MG: 1 INJECTION, POWDER, FOR SOLUTION INTRAVENOUS at 13:10

## 2025-07-24 RX ADMIN — PANTOPRAZOLE SODIUM 40 MG: 40 TABLET, DELAYED RELEASE ORAL at 05:56

## 2025-07-24 RX ADMIN — SODIUM CHLORIDE, SODIUM LACTATE, POTASSIUM CHLORIDE, AND CALCIUM CHLORIDE 1000 ML: .6; .31; .03; .02 INJECTION, SOLUTION INTRAVENOUS at 06:02

## 2025-07-24 RX ADMIN — ACETAMINOPHEN 650 MG: 325 TABLET ORAL at 18:17

## 2025-07-24 RX ADMIN — ACETAMINOPHEN 650 MG: 325 TABLET ORAL at 05:56

## 2025-07-24 RX ADMIN — SODIUM CHLORIDE, PRESERVATIVE FREE 10 ML: 5 INJECTION INTRAVENOUS at 21:41

## 2025-07-24 ASSESSMENT — PAIN DESCRIPTION - DESCRIPTORS
DESCRIPTORS: CRAMPING;DISCOMFORT
DESCRIPTORS: ACHING;STABBING;CRAMPING
DESCRIPTORS: ACHING;CRAMPING

## 2025-07-24 ASSESSMENT — PAIN SCALES - GENERAL
PAINLEVEL_OUTOF10: 6
PAINLEVEL_OUTOF10: 0
PAINLEVEL_OUTOF10: 7
PAINLEVEL_OUTOF10: 3
PAINLEVEL_OUTOF10: 0

## 2025-07-24 ASSESSMENT — PAIN DESCRIPTION - ORIENTATION
ORIENTATION: LOWER
ORIENTATION: ANTERIOR

## 2025-07-24 ASSESSMENT — PAIN - FUNCTIONAL ASSESSMENT: PAIN_FUNCTIONAL_ASSESSMENT: ACTIVITIES ARE NOT PREVENTED

## 2025-07-24 ASSESSMENT — PAIN DESCRIPTION - LOCATION
LOCATION: ABDOMEN

## 2025-07-24 NOTE — CONSULTS
Consult Note            Date:7/24/2025        Patient Name:Dayan Lyons     YOB: 1965     Age:60 y.o.    Consults Infectious Disease    Chief Complaint     Chief Complaint   Patient presents with    Fever      Broaden antibiotic coverage for colitis in patient with Penicillin allergy    History Obtained From   patient    History of Present Illness   This is a 60 year old female with history of Crohn's disease who presented with fever and abdominal pain. She had temperature 100.4. Abdomen was tender in left lower abdomen. WBC was normal but CRP was markedly elevated.   CXR was unremarkable. Initial CT Abdomen showed a 3.7 cm complex pelvic fluid collection consistent with abscess felt to be likely related to diverticulitis.  She was started on Ciprofloxacin and Flagyl. Repeat CT showed minimal change in pelvic fluid collection attributed to sigmoid diverticulitis with contained perforation. Earlier today she was seen by IR, however, it was felt that the abscess was not amenable to percutaneous drainage. ID consulted to assist with antibiotic management.  She is now on IV Meropenem (penicillin allergy).    Patient lying in bed. States that she is having severe diarrhea that is different from her Crohn's disease.  Mild abdominal pain at this time.  Past Medical History     Past Medical History:   Diagnosis Date    Anemia, unspecified 8/20/2011    Autoimmune disease     fibromyalgia    Coagulation defects     anemia    Crohn's disease (HCC)     Depression     Fibromyalgia     GERD (gastroesophageal reflux disease)     Hypercholesterolemia     Hypertension     Musculoskeletal disorder     Other ill-defined conditions(799.89)     crohns    Psychiatric disorder     depression    Unspecified sleep apnea     cpap        Past Surgical History     Past Surgical History:   Procedure Laterality Date    CHOLECYSTECTOMY  1994-95    COLONOSCOPY N/A 8/24/2020    COLONOSCOPY performed by Bandar De Santiago MD at [facility]

## 2025-07-24 NOTE — CONSULTS
INTERVENTIONAL RADIOLOGY  Consult Note      Patient:  Dayan Lyons  :  1965  Age:  60 y.o.  MRN:  [id number]    Today's Date:  2025  Admission Date:  2025  Hospital Day:  3  Consult requested by:  Laila Pascual MD    Reason for consult:  61 yo female with history of Crohn's colitis with persisting pelvic abscess, likely secondary to diverticulitis with contained perf. CT imaging from  and  shows small fluid collection adjacent to sigmoid colon, sitting atop the urinary bladder, measuring 2.5 x 2.0 x 3.7cm, with small gas locules. IR consulted for persisting abscess.    CURRENT MEDICATIONS  Current Facility-Administered Medications   Medication Dose Route Frequency Provider Last Rate Last Admin    diatrizoate meglumine-sodium (GASTROGRAFIN) 66-10 % solution 30 mL  30 mL Oral ONCE PRN Laila Pascual MD   30 mL at 25 1012    HYDROmorphone HCl PF (DILAUDID) injection 0.25 mg  0.25 mg IntraVENous Q3H PRN Laila Pascual MD   0.25 mg at 25 0220    Or    HYDROmorphone HCl PF (DILAUDID) injection 0.5 mg  0.5 mg IntraVENous Q3H PRN Laila Pascual MD   0.5 mg at 25 2107    ondansetron (ZOFRAN) injection 4 mg  4 mg IntraVENous Q6H PRN Abrahan Alves MD        dextrose 5 % and 0.45 % NaCl with KCl 20 mEq infusion   IntraVENous Continuous Laila Pascual  mL/hr at 25 0011 Rate Verify at 25 0011    sodium chloride flush 0.9 % injection 5-40 mL  5-40 mL IntraVENous 2 times per day Jarrett Cortez MD   10 mL at 25 210    sodium chloride flush 0.9 % injection 5-40 mL  5-40 mL IntraVENous PRN Jarrett Cortez MD        0.9 % sodium chloride infusion   IntraVENous PRN Jarrett Cortez MD        potassium chloride (KLOR-CON M) extended release tablet 40 mEq  40 mEq Oral PRN Jarrett Cortez MD   40 mEq at 25 0609    Or    potassium bicarb-citric acid (EFFER-K) effervescent tablet 40 mEq  40 mEq Oral Jarrett Angulo  recent):  CT ABDOMEN PELVIS W IV CONTRAST 07/23/2025    Narrative  EXAM: CT ABDOMEN PELVIS W IV CONTRAST    INDICATION: worsening abdominal pain, diarrhea, pelvic abscess    COMPARISON: 7/20/2025    CONTRAST: 100 mL of Isovue-370.    ORAL CONTRAST: Oral contrast was administered to better evaluate the bowel.    TECHNIQUE:  Following intravenous administration of contrast, thin axial images were  obtained through the abdomen and pelvis. Coronal and sagittal reconstructions  were generated. CT dose reduction was achieved through use of a standardized  protocol tailored for this examination and automatic exposure control for dose  modulation.    FINDINGS:  LOWER THORAX: No significant abnormality in the incidentally imaged lower chest.  LIVER: No mass.  BILIARY TREE: Cholecystectomy. CBD is not dilated.  SPLEEN: within normal limits.  PANCREAS: No mass or ductal dilatation.  ADRENALS: Unremarkable.  KIDNEYS: No mass, calculus, or hydronephrosis.  STOMACH: Unremarkable.  SMALL BOWEL: No dilatation or wall thickening.  COLON: Ongoing small fluid collection adjacent to the sigmoid colon. Overall  size of the fluid collection has not changed significantly, demonstrates 2  locules, and dominant locule measures approximately 2.5 x 2.0 cm. Craniocaudal  extent is approximately 3.7 cm, unchanged. There are couple of locules of gas  within this collection, which are new from prior.  APPENDIX: Normal.  PERITONEUM: No ascites or pneumoperitoneum.  RETROPERITONEUM: No lymphadenopathy or aortic aneurysm.  REPRODUCTIVE ORGANS: Unremarkable.  URINARY BLADDER: No mass or calculus.  BONES: No destructive bone lesion.  ABDOMINAL WALL: No mass or hernia.  ADDITIONAL COMMENTS: Prominent left inguinal lymph nodes.    Impression  Minimal change in pelvic fluid collection, likely secondary to sigmoid  diverticulitis with contained perforation.    Electronically signed by VERNON WILKINS        LABS  Lab Results   Component Value Date/Time    WBC

## 2025-07-24 NOTE — PROGRESS NOTES
Department of General Surgery - Adult  Surgical Service    Progress Note      SUBJECTIVE:  Afebrile, WBC normal. Still complaining of worsening abdominal pain and nausea and multiple loose stools, 8-10 nonbloody BMs past 24 hours. On CLD but not eating much.     OBJECTIVE      Physical    VITALS:  BP (!) 143/78   Pulse 75   Temp 97.9 °F (36.6 °C) (Oral)   Resp 14   Ht 1.549 m (5' 1\")   Wt 99.8 kg (220 lb 0.3 oz)   SpO2 97%   BMI 41.57 kg/m²   INTAKE/OUTPUT:    Intake/Output Summary (Last 24 hours) at 7/24/2025 1904  Last data filed at 7/24/2025 1616  Gross per 24 hour   Intake 2983.28 ml   Output 1150 ml   Net 1833.28 ml     NAD  NCAT  No scleral icterus  Trachea midline  Normal WOB on RA  Abd soft, tender to deep palpation in LUQ, LLQ, RLQ. No rebound or guarding.  Ext WWP  Grossly neuro intact  No jaundice  Affect appropriate     Data  Reviewed.     Current Inpatient Medications    Reviewed.     ASSESSMENT AND PLAN    This is a 60-year-old female with history of Crohn's colitis who presents with a flare vs sigmoid diverticulitis and has a small pelvic abscess measuring 3 cm in size.  Abscess is too small for percutaneous drainage.      Broaden to Meropeneum. Discussed with ID given her allergies.  GI consult  Patient wishes to stay on CLD, diet as tolerated.   Serial abdominal exams  DVT prophylaxis  If ongoing symptoms without improvement on antibiotic therapy, may require OR.    Laila Pascual MD   July 24, 2025

## 2025-07-24 NOTE — PLAN OF CARE
Problem: Discharge Planning  Goal: Discharge to home or other facility with appropriate resources  7/23/2025 2120 by PATRICIA Vigil RN  Outcome: Progressing  7/23/2025 0802 by Airam Tomlinson RN  Outcome: Progressing     Problem: Pain  Goal: Verbalizes/displays adequate comfort level or baseline comfort level  7/23/2025 2120 by PATRICIA Vigil RN  Outcome: Progressing  7/23/2025 0802 by Airam Tomlinson RN  Outcome: Progressing     Problem: Skin/Tissue Integrity  Goal: Skin integrity remains intact  Description: 1.  Monitor for areas of redness and/or skin breakdown  2.  Assess vascular access sites hourly  3.  Every 4-6 hours minimum:  Change oxygen saturation probe site  4.  Every 4-6 hours:  If on nasal continuous positive airway pressure, respiratory therapy assess nares and determine need for appliance change or resting period  7/23/2025 2120 by PATRICIA Vigil RN  Outcome: Progressing  7/23/2025 0802 by Airam Tomlinson RN  Outcome: Progressing     Problem: Safety - Adult  Goal: Free from fall injury  7/23/2025 2120 by PATRICIA Vigil RN  Outcome: Progressing  7/23/2025 0802 by Airam Tomlinson RN  Outcome: Progressing

## 2025-07-24 NOTE — CARE COORDINATION
CM reviewed chart. Patient remains on IV ABX and clear liquid diet.    Awaiting advancement of diet, clinical improvement.    Current DCP: home self care once clinically stable.

## 2025-07-25 PROBLEM — K50.119 CROHN'S DISEASE OF LARGE INTESTINE WITH COMPLICATION (HCC): Status: ACTIVE | Noted: 2025-07-21

## 2025-07-25 PROBLEM — R19.7 DIARRHEA: Status: ACTIVE | Noted: 2025-07-25

## 2025-07-25 PROBLEM — K57.20 DIVERTICULITIS OF LARGE INTESTINE WITH ABSCESS WITHOUT BLEEDING: Status: ACTIVE | Noted: 2025-07-25

## 2025-07-25 PROBLEM — A41.9 SEPSIS WITHOUT ACUTE ORGAN DYSFUNCTION (HCC): Status: ACTIVE | Noted: 2025-07-25

## 2025-07-25 LAB
BASOPHILS # BLD: 0 K/UL (ref 0–0.1)
BASOPHILS NFR BLD: 0 % (ref 0–1)
CRP SERPL-MCNC: 10.2 MG/DL (ref 0–0.3)
DIFFERENTIAL METHOD BLD: ABNORMAL
EOSINOPHIL # BLD: 0.07 K/UL (ref 0–0.4)
EOSINOPHIL NFR BLD: 1.4 % (ref 0–7)
ERYTHROCYTE [DISTWIDTH] IN BLOOD BY AUTOMATED COUNT: 14.6 % (ref 11.5–14.5)
HCT VFR BLD AUTO: 27.3 % (ref 35–47)
HGB BLD-MCNC: 8.6 G/DL (ref 11.5–16)
IMM GRANULOCYTES # BLD AUTO: 0.02 K/UL (ref 0–0.04)
IMM GRANULOCYTES NFR BLD AUTO: 0.4 % (ref 0–0.5)
LYMPHOCYTES # BLD: 0.53 K/UL (ref 0.8–3.5)
LYMPHOCYTES NFR BLD: 10.3 % (ref 12–49)
MCH RBC QN AUTO: 29.4 PG (ref 26–34)
MCHC RBC AUTO-ENTMCNC: 31.5 G/DL (ref 30–36.5)
MCV RBC AUTO: 93.2 FL (ref 80–99)
MONOCYTES # BLD: 0.3 K/UL (ref 0–1)
MONOCYTES NFR BLD: 5.8 % (ref 5–13)
NEUTS SEG # BLD: 4.21 K/UL (ref 1.8–8)
NEUTS SEG NFR BLD: 82.1 % (ref 32–75)
NRBC # BLD: 0 K/UL (ref 0–0.01)
NRBC BLD-RTO: 0 PER 100 WBC
PLATELET # BLD AUTO: 214 K/UL (ref 150–400)
PMV BLD AUTO: 11 FL (ref 8.9–12.9)
PROCALCITONIN SERPL-MCNC: 0.06 NG/ML
RBC # BLD AUTO: 2.93 M/UL (ref 3.8–5.2)
WBC # BLD AUTO: 5.1 K/UL (ref 3.6–11)

## 2025-07-25 PROCEDURE — 84145 PROCALCITONIN (PCT): CPT

## 2025-07-25 PROCEDURE — 94761 N-INVAS EAR/PLS OXIMETRY MLT: CPT

## 2025-07-25 PROCEDURE — 85025 COMPLETE CBC W/AUTO DIFF WBC: CPT

## 2025-07-25 PROCEDURE — 87040 BLOOD CULTURE FOR BACTERIA: CPT

## 2025-07-25 PROCEDURE — 2500000003 HC RX 250 WO HCPCS: Performed by: STUDENT IN AN ORGANIZED HEALTH CARE EDUCATION/TRAINING PROGRAM

## 2025-07-25 PROCEDURE — 6370000000 HC RX 637 (ALT 250 FOR IP): Performed by: SURGERY

## 2025-07-25 PROCEDURE — 6360000002 HC RX W HCPCS: Performed by: STUDENT IN AN ORGANIZED HEALTH CARE EDUCATION/TRAINING PROGRAM

## 2025-07-25 PROCEDURE — 99231 SBSQ HOSP IP/OBS SF/LOW 25: CPT | Performed by: STUDENT IN AN ORGANIZED HEALTH CARE EDUCATION/TRAINING PROGRAM

## 2025-07-25 PROCEDURE — 2580000003 HC RX 258: Performed by: STUDENT IN AN ORGANIZED HEALTH CARE EDUCATION/TRAINING PROGRAM

## 2025-07-25 PROCEDURE — 6360000002 HC RX W HCPCS: Performed by: SURGERY

## 2025-07-25 PROCEDURE — 86140 C-REACTIVE PROTEIN: CPT

## 2025-07-25 PROCEDURE — 2500000003 HC RX 250 WO HCPCS: Performed by: SURGERY

## 2025-07-25 PROCEDURE — 87449 NOS EACH ORGANISM AG IA: CPT

## 2025-07-25 PROCEDURE — 99232 SBSQ HOSP IP/OBS MODERATE 35: CPT | Performed by: INTERNAL MEDICINE

## 2025-07-25 PROCEDURE — 1100000000 HC RM PRIVATE

## 2025-07-25 PROCEDURE — 6370000000 HC RX 637 (ALT 250 FOR IP): Performed by: STUDENT IN AN ORGANIZED HEALTH CARE EDUCATION/TRAINING PROGRAM

## 2025-07-25 PROCEDURE — 36415 COLL VENOUS BLD VENIPUNCTURE: CPT

## 2025-07-25 RX ADMIN — ACETAMINOPHEN 650 MG: 325 TABLET ORAL at 18:54

## 2025-07-25 RX ADMIN — HYDROCHLOROTHIAZIDE 25 MG: 25 TABLET ORAL at 08:46

## 2025-07-25 RX ADMIN — HYDROMORPHONE HYDROCHLORIDE 0.5 MG: 1 INJECTION, SOLUTION INTRAMUSCULAR; INTRAVENOUS; SUBCUTANEOUS at 00:40

## 2025-07-25 RX ADMIN — MEROPENEM 1000 MG: 1 INJECTION INTRAVENOUS at 12:00

## 2025-07-25 RX ADMIN — HYDROMORPHONE HYDROCHLORIDE 0.5 MG: 1 INJECTION, SOLUTION INTRAMUSCULAR; INTRAVENOUS; SUBCUTANEOUS at 09:34

## 2025-07-25 RX ADMIN — ACETAMINOPHEN 650 MG: 325 TABLET ORAL at 23:51

## 2025-07-25 RX ADMIN — MEROPENEM 1000 MG: 1 INJECTION INTRAVENOUS at 03:46

## 2025-07-25 RX ADMIN — DEXTROSE MONOHYDRATE, SODIUM CHLORIDE, AND POTASSIUM CHLORIDE: 50; 1.49; 4.5 INJECTION, SOLUTION INTRAVENOUS at 22:54

## 2025-07-25 RX ADMIN — ACETAMINOPHEN 650 MG: 325 TABLET ORAL at 00:40

## 2025-07-25 RX ADMIN — HYDROMORPHONE HYDROCHLORIDE 0.5 MG: 1 INJECTION, SOLUTION INTRAMUSCULAR; INTRAVENOUS; SUBCUTANEOUS at 16:32

## 2025-07-25 RX ADMIN — ENOXAPARIN SODIUM 40 MG: 100 INJECTION SUBCUTANEOUS at 08:47

## 2025-07-25 RX ADMIN — ONDANSETRON 4 MG: 2 INJECTION, SOLUTION INTRAMUSCULAR; INTRAVENOUS at 00:40

## 2025-07-25 RX ADMIN — SODIUM CHLORIDE, PRESERVATIVE FREE 10 ML: 5 INJECTION INTRAVENOUS at 08:48

## 2025-07-25 RX ADMIN — DEXTROSE MONOHYDRATE, SODIUM CHLORIDE, AND POTASSIUM CHLORIDE: 50; 1.49; 4.5 INJECTION, SOLUTION INTRAVENOUS at 11:40

## 2025-07-25 RX ADMIN — DEXTROSE MONOHYDRATE, SODIUM CHLORIDE, AND POTASSIUM CHLORIDE: 50; 1.49; 4.5 INJECTION, SOLUTION INTRAVENOUS at 00:43

## 2025-07-25 RX ADMIN — ASPIRIN 81 MG: 81 TABLET, CHEWABLE ORAL at 08:46

## 2025-07-25 RX ADMIN — ROSUVASTATIN CALCIUM 40 MG: 20 TABLET, FILM COATED ORAL at 20:30

## 2025-07-25 RX ADMIN — ACETAMINOPHEN 650 MG: 325 TABLET ORAL at 05:32

## 2025-07-25 RX ADMIN — SODIUM CHLORIDE, PRESERVATIVE FREE 10 ML: 5 INJECTION INTRAVENOUS at 20:30

## 2025-07-25 RX ADMIN — Medication 2000 UNITS: at 08:47

## 2025-07-25 RX ADMIN — ACETAMINOPHEN 650 MG: 325 TABLET ORAL at 12:19

## 2025-07-25 RX ADMIN — PANTOPRAZOLE SODIUM 40 MG: 40 TABLET, DELAYED RELEASE ORAL at 05:32

## 2025-07-25 RX ADMIN — FLUOXETINE HYDROCHLORIDE 40 MG: 20 CAPSULE ORAL at 08:46

## 2025-07-25 RX ADMIN — MEROPENEM 1000 MG: 1 INJECTION INTRAVENOUS at 18:57

## 2025-07-25 ASSESSMENT — PAIN SCALES - GENERAL
PAINLEVEL_OUTOF10: 8
PAINLEVEL_OUTOF10: 5
PAINLEVEL_OUTOF10: 3
PAINLEVEL_OUTOF10: 7
PAINLEVEL_OUTOF10: 3
PAINLEVEL_OUTOF10: 7
PAINLEVEL_OUTOF10: 2

## 2025-07-25 ASSESSMENT — PAIN - FUNCTIONAL ASSESSMENT
PAIN_FUNCTIONAL_ASSESSMENT: ACTIVITIES ARE NOT PREVENTED

## 2025-07-25 ASSESSMENT — PAIN SCALES - WONG BAKER
WONGBAKER_NUMERICALRESPONSE: HURTS A LITTLE BIT
WONGBAKER_NUMERICALRESPONSE: NO HURT

## 2025-07-25 ASSESSMENT — PAIN DESCRIPTION - DESCRIPTORS
DESCRIPTORS: ACHING;CRAMPING
DESCRIPTORS: CRAMPING;ACHING
DESCRIPTORS: ACHING;CRAMPING
DESCRIPTORS: ACHING;CRAMPING

## 2025-07-25 ASSESSMENT — PAIN DESCRIPTION - LOCATION
LOCATION: ABDOMEN

## 2025-07-25 ASSESSMENT — PAIN DESCRIPTION - ORIENTATION
ORIENTATION: LEFT;LOWER
ORIENTATION: LOWER
ORIENTATION: LEFT;LOWER

## 2025-07-25 NOTE — PLAN OF CARE
Problem: Skin/Tissue Integrity  Goal: Skin integrity remains intact  Description: 1.  Monitor for areas of redness and/or skin breakdown  2.  Assess vascular access sites hourly  3.  Every 4-6 hours minimum:  Change oxygen saturation probe site  4.  Every 4-6 hours:  If on nasal continuous positive airway pressure, respiratory therapy assess nares and determine need for appliance change or resting period  7/24/2025 2231 by Kavin Sifuentes RN  Outcome: Progressing  Flowsheets (Taken 7/24/2025 2231)  Skin Integrity Remains Intact:   Monitor for areas of redness and/or skin breakdown   Every 4-6 hours minimum:  Change oxygen saturation probe site  7/24/2025 2014 by Lashawn Calabrese RN  Outcome: Progressing      Problem: Pain  Goal: Verbalizes/displays adequate comfort level or baseline comfort level  7/24/2025 2231 by Kavin Sifuentes RN  Outcome: Progressing  Flowsheets (Taken 7/24/2025 2231)  Verbalizes/displays adequate comfort level or baseline comfort level:   Encourage patient to monitor pain and request assistance   Assess pain using appropriate pain scale   Administer analgesics based on type and severity of pain and evaluate response  7/24/2025 2014 by Lashawn Calabrese, RN  Outcome: Progressing

## 2025-07-25 NOTE — CARE COORDINATION
CM reviewed chart. Patient remains on IV ABX, still having diarrhea. Awaiting r/o Cdiff.     ID, Surgery following.     DCP: home self care once clinically stable.

## 2025-07-25 NOTE — PROGRESS NOTES
Comprehensive Nutrition Assessment    Type and Reason for Visit:  Initial, Positive nutrition screen    Nutrition Recommendations/Plan:   Advance diet to low fiber as medically appropriate.  Add Clear ONS to diet order.  Monitor intake, BM and fluid status in I/Os.      Malnutrition Assessment:  Malnutrition Status:  No malnutrition (07/25/25 1018)    Context:  Acute Illness     Findings of the 6 clinical characteristics of malnutrition:  Energy Intake:  Mild decrease in energy intake  Weight Loss:  Greater than 7.5% over 3 months     Body Fat Loss:  No body fat loss     Muscle Mass Loss:  No muscle mass loss    Fluid Accumulation:  No fluid accumulation     Strength:  Not Performed    Nutrition Assessment:    Screened for Clear Liquid diet x 4 days, no documented intake. Will add a clear ONS to diet order. Current weight down significantly in past 5 months at 12%, continues above accpetable BMI. Reports poor po PTA, abd pain. Labs reviewed, Mag and Phos depleted, no noted repletion, receives IVH at 100 ml/hr.    Nutrition Related Findings:      Wound Type: None       Current Nutrition Intake & Therapies:    Average Meal Intake: Unable to assess  Average Supplements Intake: None Ordered  ADULT DIET; Clear Liquid    Anthropometric Measures:  Height: 154.9 cm (5' 0.98\")  Ideal Body Weight (IBW): 105 lbs (48 kg)    Admission Body Weight: 99.8 kg (220 lb 0.3 oz)  Current Body Weight: 99.8 kg (220 lb 0.3 oz), 209.5 % IBW. Weight Source: Bed scale  Current BMI (kg/m2): 41.6  Usual Body Weight: 113.4 kg (250 lb)     % Weight Change (Calculated): -12                    BMI Categories: Obese Class 3 (BMI 40.0 or greater)    Estimated Daily Nutrient Needs:  Energy Requirements Based On: Formula     Energy (kcal/day): 4883-5384 kcals (MSJ)  Weight Used for Protein Requirements: Current  Protein (g/day): 80-100gr (0.8-1.0 gr/kg)  Method Used for Fluid Requirements: ml/Kg  Fluid (ml/day): 3000 ml (30ml/kg)    Nutrition

## 2025-07-25 NOTE — PROGRESS NOTES
Department of General Surgery - Adult  Surgical Service    Progress Note      SUBJECTIVE:  Afebrile, WBC normal. Abdominal pain slightly better today. Having more of an appetite and requesting solid food.      OBJECTIVE      Physical    VITALS:  BP (!) 109/42   Pulse 75   Temp 97.9 °F (36.6 °C) (Oral)   Resp 18   Ht 1.549 m (5' 0.98\")   Wt 99.8 kg (220 lb 0.3 oz)   SpO2 99%   BMI 41.59 kg/m²   INTAKE/OUTPUT:    Intake/Output Summary (Last 24 hours) at 7/25/2025 1631  Last data filed at 7/25/2025 0059  Gross per 24 hour   Intake --   Output 900 ml   Net -900 ml     NAD  NCAT  No scleral icterus  Trachea midline  Normal WOB on RA  Abd soft, tender to deep palpation in LUQ, LLQ, RLQ. No rebound or guarding. Slightly improved from yesterday.  Ext WWP  Grossly neuro intact  No jaundice  Affect appropriate     Data  Reviewed.     Current Inpatient Medications    Reviewed.     ASSESSMENT AND PLAN    This is a 60-year-old female with history of Crohn's colitis who presents with a flare vs sigmoid diverticulitis and has a small pelvic abscess measuring 3 cm in size.  Abscess is too small for percutaneous drainage.      Broaden to Meropeneum. Discussed with ID given her allergies.  GI consult  Diet as tolerated.  Serial abdominal exams  DVT prophylaxis  If ongoing symptoms without improvement on antibiotic therapy, may require OR.    Laila Pascual MD   July 25, 2025

## 2025-07-26 LAB
BASOPHILS # BLD: 0.01 K/UL (ref 0–0.1)
BASOPHILS NFR BLD: 0.2 % (ref 0–1)
CRP SERPL-MCNC: 7.44 MG/DL (ref 0–0.3)
DIFFERENTIAL METHOD BLD: ABNORMAL
EOSINOPHIL # BLD: 0.11 K/UL (ref 0–0.4)
EOSINOPHIL NFR BLD: 2.4 % (ref 0–7)
ERYTHROCYTE [DISTWIDTH] IN BLOOD BY AUTOMATED COUNT: 14.7 % (ref 11.5–14.5)
HCT VFR BLD AUTO: 28.6 % (ref 35–47)
HGB BLD-MCNC: 9 G/DL (ref 11.5–16)
IMM GRANULOCYTES # BLD AUTO: 0.02 K/UL (ref 0–0.04)
IMM GRANULOCYTES NFR BLD AUTO: 0.4 % (ref 0–0.5)
LYMPHOCYTES # BLD: 0.5 K/UL (ref 0.8–3.5)
LYMPHOCYTES NFR BLD: 11.1 % (ref 12–49)
MCH RBC QN AUTO: 29.6 PG (ref 26–34)
MCHC RBC AUTO-ENTMCNC: 31.5 G/DL (ref 30–36.5)
MCV RBC AUTO: 94.1 FL (ref 80–99)
MONOCYTES # BLD: 0.25 K/UL (ref 0–1)
MONOCYTES NFR BLD: 5.6 % (ref 5–13)
NEUTS SEG # BLD: 3.61 K/UL (ref 1.8–8)
NEUTS SEG NFR BLD: 80.3 % (ref 32–75)
NRBC # BLD: 0 K/UL (ref 0–0.01)
NRBC BLD-RTO: 0 PER 100 WBC
PLATELET # BLD AUTO: 247 K/UL (ref 150–400)
PMV BLD AUTO: 11.2 FL (ref 8.9–12.9)
PROCALCITONIN SERPL-MCNC: 0.1 NG/ML
RBC # BLD AUTO: 3.04 M/UL (ref 3.8–5.2)
WBC # BLD AUTO: 4.5 K/UL (ref 3.6–11)

## 2025-07-26 PROCEDURE — 86140 C-REACTIVE PROTEIN: CPT

## 2025-07-26 PROCEDURE — 6360000002 HC RX W HCPCS: Performed by: STUDENT IN AN ORGANIZED HEALTH CARE EDUCATION/TRAINING PROGRAM

## 2025-07-26 PROCEDURE — 6360000002 HC RX W HCPCS: Performed by: SURGERY

## 2025-07-26 PROCEDURE — 6370000000 HC RX 637 (ALT 250 FOR IP): Performed by: STUDENT IN AN ORGANIZED HEALTH CARE EDUCATION/TRAINING PROGRAM

## 2025-07-26 PROCEDURE — 1100000000 HC RM PRIVATE

## 2025-07-26 PROCEDURE — 36415 COLL VENOUS BLD VENIPUNCTURE: CPT

## 2025-07-26 PROCEDURE — 2580000003 HC RX 258: Performed by: STUDENT IN AN ORGANIZED HEALTH CARE EDUCATION/TRAINING PROGRAM

## 2025-07-26 PROCEDURE — 2500000003 HC RX 250 WO HCPCS: Performed by: SURGERY

## 2025-07-26 PROCEDURE — 85025 COMPLETE CBC W/AUTO DIFF WBC: CPT

## 2025-07-26 PROCEDURE — 2500000003 HC RX 250 WO HCPCS: Performed by: STUDENT IN AN ORGANIZED HEALTH CARE EDUCATION/TRAINING PROGRAM

## 2025-07-26 PROCEDURE — 84145 PROCALCITONIN (PCT): CPT

## 2025-07-26 PROCEDURE — 99231 SBSQ HOSP IP/OBS SF/LOW 25: CPT | Performed by: STUDENT IN AN ORGANIZED HEALTH CARE EDUCATION/TRAINING PROGRAM

## 2025-07-26 PROCEDURE — 6370000000 HC RX 637 (ALT 250 FOR IP): Performed by: SURGERY

## 2025-07-26 RX ORDER — HYDROMORPHONE HYDROCHLORIDE 1 MG/ML
1 INJECTION, SOLUTION INTRAMUSCULAR; INTRAVENOUS; SUBCUTANEOUS EVERY 4 HOURS PRN
Status: DISPENSED | OUTPATIENT
Start: 2025-07-26 | End: 2025-07-28

## 2025-07-26 RX ORDER — HYDROMORPHONE HYDROCHLORIDE 1 MG/ML
0.5 INJECTION, SOLUTION INTRAMUSCULAR; INTRAVENOUS; SUBCUTANEOUS EVERY 4 HOURS PRN
Status: ACTIVE | OUTPATIENT
Start: 2025-07-26 | End: 2025-07-28

## 2025-07-26 RX ADMIN — HYDROMORPHONE HYDROCHLORIDE 1 MG: 1 INJECTION, SOLUTION INTRAMUSCULAR; INTRAVENOUS; SUBCUTANEOUS at 15:23

## 2025-07-26 RX ADMIN — SODIUM CHLORIDE, PRESERVATIVE FREE 10 ML: 5 INJECTION INTRAVENOUS at 21:08

## 2025-07-26 RX ADMIN — HYDROMORPHONE HYDROCHLORIDE 1 MG: 1 INJECTION, SOLUTION INTRAMUSCULAR; INTRAVENOUS; SUBCUTANEOUS at 22:58

## 2025-07-26 RX ADMIN — MEROPENEM 1000 MG: 1 INJECTION INTRAVENOUS at 03:14

## 2025-07-26 RX ADMIN — FLUOXETINE HYDROCHLORIDE 40 MG: 20 CAPSULE ORAL at 09:16

## 2025-07-26 RX ADMIN — SODIUM CHLORIDE, PRESERVATIVE FREE 10 ML: 5 INJECTION INTRAVENOUS at 09:17

## 2025-07-26 RX ADMIN — METOPROLOL TARTRATE 50 MG: 50 TABLET, FILM COATED ORAL at 09:16

## 2025-07-26 RX ADMIN — HYDROCHLOROTHIAZIDE 25 MG: 25 TABLET ORAL at 09:17

## 2025-07-26 RX ADMIN — ROSUVASTATIN CALCIUM 40 MG: 20 TABLET, FILM COATED ORAL at 20:57

## 2025-07-26 RX ADMIN — DEXTROSE MONOHYDRATE, SODIUM CHLORIDE, AND POTASSIUM CHLORIDE: 50; 1.49; 4.5 INJECTION, SOLUTION INTRAVENOUS at 07:43

## 2025-07-26 RX ADMIN — ACETAMINOPHEN 650 MG: 325 TABLET ORAL at 18:46

## 2025-07-26 RX ADMIN — PANTOPRAZOLE SODIUM 40 MG: 40 TABLET, DELAYED RELEASE ORAL at 06:55

## 2025-07-26 RX ADMIN — DEXTROSE MONOHYDRATE, SODIUM CHLORIDE, AND POTASSIUM CHLORIDE: 50; 1.49; 4.5 INJECTION, SOLUTION INTRAVENOUS at 18:48

## 2025-07-26 RX ADMIN — MEROPENEM 1000 MG: 1 INJECTION INTRAVENOUS at 11:52

## 2025-07-26 RX ADMIN — ACETAMINOPHEN 650 MG: 325 TABLET ORAL at 04:59

## 2025-07-26 RX ADMIN — ENOXAPARIN SODIUM 40 MG: 100 INJECTION SUBCUTANEOUS at 09:17

## 2025-07-26 RX ADMIN — Medication 2000 UNITS: at 09:17

## 2025-07-26 RX ADMIN — ACETAMINOPHEN 650 MG: 325 TABLET ORAL at 22:59

## 2025-07-26 RX ADMIN — MEROPENEM 1000 MG: 1 INJECTION INTRAVENOUS at 18:48

## 2025-07-26 RX ADMIN — ACETAMINOPHEN 650 MG: 325 TABLET ORAL at 11:50

## 2025-07-26 RX ADMIN — ASPIRIN 81 MG: 81 TABLET, CHEWABLE ORAL at 09:17

## 2025-07-26 ASSESSMENT — PAIN SCALES - GENERAL
PAINLEVEL_OUTOF10: 7
PAINLEVEL_OUTOF10: 7
PAINLEVEL_OUTOF10: 0

## 2025-07-26 ASSESSMENT — PAIN DESCRIPTION - LOCATION
LOCATION: ABDOMEN

## 2025-07-26 NOTE — PROGRESS NOTES
0747. During bedside shift report, patient required ADL care and this nurse observed redness to buttocks with areas that were non-blanchable. Educated the patient on the importance of shifting weight off buttocks to help prevent further injury and keeping area as dry as possible. This possible has had multiple episodes of loose stools this admission. Provider was notified via secure message and wound care consult was placed.

## 2025-07-26 NOTE — PLAN OF CARE
Problem: Pain  Goal: Verbalizes/displays adequate comfort level or baseline comfort level  7/25/2025 2146 by Kavin Sifuentes RN  Outcome: Progressing  Flowsheets (Taken 7/24/2025 2231)  Verbalizes/displays adequate comfort level or baseline comfort level:   Encourage patient to monitor pain and request assistance   Assess pain using appropriate pain scale   Administer analgesics based on type and severity of pain and evaluate response  7/25/2025 1310 by Carol Ann Rodrigues LPN  Outcome: Progressing     Problem: Skin/Tissue Integrity  Goal: Skin integrity remains intact  Description: 1.  Monitor for areas of redness and/or skin breakdown  2.  Assess vascular access sites hourly  3.  Every 4-6 hours minimum:  Change oxygen saturation probe site  4.  Every 4-6 hours:  If on nasal continuous positive airway pressure, respiratory therapy assess nares and determine need for appliance change or resting period  7/25/2025 2146 by Kavin Sifuentes RN  Outcome: Progressing  Flowsheets (Taken 7/25/2025 2146)  Skin Integrity Remains Intact:   Monitor for areas of redness and/or skin breakdown   Every 4-6 hours minimum:  Change oxygen saturation probe site   Every 4-6 hours:  If on nasal continuous positive airway pressure, assess nares and determine need for appliance change or resting period  7/25/2025 1310 by Carol Ann Rodrigues LPN  Outcome: Progressing

## 2025-07-26 NOTE — PLAN OF CARE
Problem: Discharge Planning  Goal: Discharge to home or other facility with appropriate resources  Outcome: Progressing  Flowsheets (Taken 7/26/2025 0921)  Discharge to home or other facility with appropriate resources:   Identify barriers to discharge with patient and caregiver   Arrange for needed discharge resources and transportation as appropriate     Problem: Pain  Goal: Verbalizes/displays adequate comfort level or baseline comfort level  Outcome: Progressing  Flowsheets (Taken 7/26/2025 0740)  Verbalizes/displays adequate comfort level or baseline comfort level:   Encourage patient to monitor pain and request assistance   Assess pain using appropriate pain scale     Problem: Skin/Tissue Integrity  Goal: Skin integrity remains intact  Description: 1.  Monitor for areas of redness and/or skin breakdown  2.  Assess vascular access sites hourly  3.  Every 4-6 hours minimum:  Change oxygen saturation probe site  4.  Every 4-6 hours:  If on nasal continuous positive airway pressure, respiratory therapy assess nares and determine need for appliance change or resting period  Recent Flowsheet Documentation  Taken 7/26/2025 0921 by Venita Mendoza RN  Skin Integrity Remains Intact: Monitor for areas of redness and/or skin breakdown     Problem: Safety - Adult  Goal: Free from fall injury  Outcome: Progressing

## 2025-07-26 NOTE — PROGRESS NOTES
Department of General Surgery - Adult  Surgical Service    Progress Note      SUBJECTIVE:  Afebrile. Abdominal pain unchanged. Tolerating solid food. Ongoing diarrhea.     OBJECTIVE      Physical    VITALS:  BP (!) 124/58   Pulse 89   Temp 97.9 °F (36.6 °C) (Oral)   Resp 18   Ht 1.549 m (5' 0.98\")   Wt 99.8 kg (220 lb 0.3 oz)   SpO2 99%   BMI 41.59 kg/m²   INTAKE/OUTPUT:    Intake/Output Summary (Last 24 hours) at 7/26/2025 1324  Last data filed at 7/26/2025 1048  Gross per 24 hour   Intake 2845.61 ml   Output 2450 ml   Net 395.61 ml     NAD  NCAT  No scleral icterus  Trachea midline  Normal WOB on RA  Abd soft, tender to deep palpation in LUQ, LLQ, RLQ. No rebound or guarding. Stable from yesterday.  Ext WWP  Grossly neuro intact  No jaundice  Affect appropriate     Data  Reviewed.     Current Inpatient Medications    Reviewed.     ASSESSMENT AND PLAN    This is a 60-year-old female with history of Crohn's colitis who presents with a flare vs sigmoid diverticulitis and has a small pelvic abscess measuring 3 cm in size.  Abscess is too small for percutaneous drainage.      Meropeneum. Discussed with ID given her allergies.  GI consult  Diet as tolerated.  Serial abdominal exams  DVT prophylaxis  If ongoing symptoms without improvement on antibiotic therapy, may require OR. Will repeat CT in 24-48h if no improvement    Laila Pascual MD   July 26, 2025

## 2025-07-26 NOTE — PROGRESS NOTES
Progress Note  Date:2025       Room:[facility]  Patient Name:Dayan Lyons     YOB: 1965     Age:60 y.o.        Subjective    Subjective Patient followed for intra-abdominopelvic abscess secondary to sigmoid diverticulitis. Also has underlying Crohn's disease with uncharacteristic diarrhea. C. Difficile toxin was ordered. She is resting comfortably at this time with no abdominal pain. Still reports diarrhea but no specimen sent for C. Diff testing.     Objective         Vitals Last 24 Hours:  TEMPERATURE:  Temp  Av.9 °F (36.6 °C)  Min: 97.7 °F (36.5 °C)  Max: 98.1 °F (36.7 °C)  RESPIRATIONS RANGE: Resp  Av.6  Min: 16  Max: 18  PULSE OXIMETRY RANGE: SpO2  Av.4 %  Min: 97 %  Max: 100 %  PULSE RANGE: Pulse  Av.2  Min: 73  Max: 82  BLOOD PRESSURE RANGE: Systolic (24hrs), Av , Min:109 , Max:133   ; Diastolic (24hrs), Av, Min:42, Max:64         Objective  Vitals and nursing note reviewed.   Constitutional:       Appearance: She is obese. She is ill-appearing.   HENT:      Head: Normocephalic and atraumatic.      Right Ear: External ear normal.      Left Ear: External ear normal.      Nose: Nose normal.      Mouth/Throat:      Pharynx: Oropharynx is clear.   Eyes:      Extraocular Movements: Extraocular movements intact.      Pupils: Pupils are equal, round, and reactive to light.   Cardiovascular:      Rate and Rhythm: Normal rate and regular rhythm.      Pulses: Normal pulses.      Heart sounds: Normal heart sounds.   Abdominal:      General: Bowel sounds are normal. There is no distension.      Palpations: Abdomen is soft.      Tenderness: There is abdominal tenderness. There is no right CVA tenderness, left CVA tenderness or guarding.   Genitourinary:     Comments: No Gonzalez catheter  Musculoskeletal:      Cervical back: Neck supple.      Right lower leg: No edema.      Left lower leg: No edema.   Skin:     Findings: Erythema present. No rash.   Neurological:      General: No

## 2025-07-26 NOTE — PLAN OF CARE
Problem: Pain  Goal: Verbalizes/displays adequate comfort level or baseline comfort level  7/26/2025 1936 by Kavin Sifuentes RN  Outcome: Progressing  Flowsheets (Taken 7/26/2025 1936)  Verbalizes/displays adequate comfort level or baseline comfort level:   Encourage patient to monitor pain and request assistance   Assess pain using appropriate pain scale   Administer analgesics based on type and severity of pain and evaluate response  7/26/2025 1815 by Venita Mendoza RN  Outcome: Progressing  Flowsheets (Taken 7/26/2025 0740)  Verbalizes/displays adequate comfort level or baseline comfort level:   Encourage patient to monitor pain and request assistance   Assess pain using appropriate pain scale     Problem: Skin/Tissue Integrity  Goal: Skin integrity remains intact  Description: 1.  Monitor for areas of redness and/or skin breakdown  2.  Assess vascular access sites hourly  3.  Every 4-6 hours minimum:  Change oxygen saturation probe site  4.  Every 4-6 hours:  If on nasal continuous positive airway pressure, respiratory therapy assess nares and determine need for appliance change or resting period  Outcome: Progressing  Flowsheets  Taken 7/26/2025 1936 by Kavin Sifuentes RN  Skin Integrity Remains Intact:   Monitor for areas of redness and/or skin breakdown   Every 4-6 hours minimum:  Change oxygen saturation probe site   Every 4-6 hours:  If on nasal continuous positive airway pressure, assess nares and determine need for appliance change or resting period  Taken 7/26/2025 0921 by Venita Mendoza RN  Skin Integrity Remains Intact: Monitor for areas of redness and/or skin breakdown

## 2025-07-27 LAB
BASOPHILS # BLD: 0.01 K/UL (ref 0–0.1)
BASOPHILS NFR BLD: 0.2 % (ref 0–1)
CRP SERPL-MCNC: 4 MG/DL (ref 0–0.3)
DIFFERENTIAL METHOD BLD: ABNORMAL
EOSINOPHIL # BLD: 0.13 K/UL (ref 0–0.4)
EOSINOPHIL NFR BLD: 3.1 % (ref 0–7)
ERYTHROCYTE [DISTWIDTH] IN BLOOD BY AUTOMATED COUNT: 14.6 % (ref 11.5–14.5)
HCT VFR BLD AUTO: 27.7 % (ref 35–47)
HGB BLD-MCNC: 8.5 G/DL (ref 11.5–16)
IMM GRANULOCYTES # BLD AUTO: 0.01 K/UL (ref 0–0.04)
IMM GRANULOCYTES NFR BLD AUTO: 0.2 % (ref 0–0.5)
LYMPHOCYTES # BLD: 0.89 K/UL (ref 0.8–3.5)
LYMPHOCYTES NFR BLD: 21 % (ref 12–49)
MCH RBC QN AUTO: 28.8 PG (ref 26–34)
MCHC RBC AUTO-ENTMCNC: 30.7 G/DL (ref 30–36.5)
MCV RBC AUTO: 93.9 FL (ref 80–99)
MONOCYTES # BLD: 0.37 K/UL (ref 0–1)
MONOCYTES NFR BLD: 8.7 % (ref 5–13)
NEUTS SEG # BLD: 2.83 K/UL (ref 1.8–8)
NEUTS SEG NFR BLD: 66.8 % (ref 32–75)
NRBC # BLD: 0 K/UL (ref 0–0.01)
NRBC BLD-RTO: 0 PER 100 WBC
PLATELET # BLD AUTO: 236 K/UL (ref 150–400)
PMV BLD AUTO: 10.2 FL (ref 8.9–12.9)
PROCALCITONIN SERPL-MCNC: <0.05 NG/ML
RBC # BLD AUTO: 2.95 M/UL (ref 3.8–5.2)
WBC # BLD AUTO: 4.2 K/UL (ref 3.6–11)

## 2025-07-27 PROCEDURE — 1100000000 HC RM PRIVATE

## 2025-07-27 PROCEDURE — 6370000000 HC RX 637 (ALT 250 FOR IP): Performed by: STUDENT IN AN ORGANIZED HEALTH CARE EDUCATION/TRAINING PROGRAM

## 2025-07-27 PROCEDURE — 6370000000 HC RX 637 (ALT 250 FOR IP): Performed by: SURGERY

## 2025-07-27 PROCEDURE — 99231 SBSQ HOSP IP/OBS SF/LOW 25: CPT | Performed by: STUDENT IN AN ORGANIZED HEALTH CARE EDUCATION/TRAINING PROGRAM

## 2025-07-27 PROCEDURE — 86140 C-REACTIVE PROTEIN: CPT

## 2025-07-27 PROCEDURE — 2500000003 HC RX 250 WO HCPCS: Performed by: SURGERY

## 2025-07-27 PROCEDURE — 84145 PROCALCITONIN (PCT): CPT

## 2025-07-27 PROCEDURE — 6360000002 HC RX W HCPCS: Performed by: SURGERY

## 2025-07-27 PROCEDURE — 36415 COLL VENOUS BLD VENIPUNCTURE: CPT

## 2025-07-27 PROCEDURE — 2580000003 HC RX 258: Performed by: STUDENT IN AN ORGANIZED HEALTH CARE EDUCATION/TRAINING PROGRAM

## 2025-07-27 PROCEDURE — 2500000003 HC RX 250 WO HCPCS: Performed by: STUDENT IN AN ORGANIZED HEALTH CARE EDUCATION/TRAINING PROGRAM

## 2025-07-27 PROCEDURE — 85025 COMPLETE CBC W/AUTO DIFF WBC: CPT

## 2025-07-27 PROCEDURE — 6360000002 HC RX W HCPCS: Performed by: STUDENT IN AN ORGANIZED HEALTH CARE EDUCATION/TRAINING PROGRAM

## 2025-07-27 RX ADMIN — METOPROLOL TARTRATE 50 MG: 50 TABLET, FILM COATED ORAL at 09:18

## 2025-07-27 RX ADMIN — MEROPENEM 1000 MG: 1 INJECTION INTRAVENOUS at 13:12

## 2025-07-27 RX ADMIN — ACETAMINOPHEN 650 MG: 325 TABLET ORAL at 05:54

## 2025-07-27 RX ADMIN — ASPIRIN 81 MG: 81 TABLET, CHEWABLE ORAL at 09:18

## 2025-07-27 RX ADMIN — DEXTROSE MONOHYDRATE, SODIUM CHLORIDE, AND POTASSIUM CHLORIDE: 50; 1.49; 4.5 INJECTION, SOLUTION INTRAVENOUS at 05:53

## 2025-07-27 RX ADMIN — ACETAMINOPHEN 650 MG: 325 TABLET ORAL at 17:58

## 2025-07-27 RX ADMIN — PANTOPRAZOLE SODIUM 40 MG: 40 TABLET, DELAYED RELEASE ORAL at 05:54

## 2025-07-27 RX ADMIN — DEXTROSE MONOHYDRATE, SODIUM CHLORIDE, AND POTASSIUM CHLORIDE: 50; 1.49; 4.5 INJECTION, SOLUTION INTRAVENOUS at 17:59

## 2025-07-27 RX ADMIN — ACETAMINOPHEN 650 MG: 325 TABLET ORAL at 13:10

## 2025-07-27 RX ADMIN — ENOXAPARIN SODIUM 40 MG: 100 INJECTION SUBCUTANEOUS at 09:18

## 2025-07-27 RX ADMIN — ROSUVASTATIN CALCIUM 40 MG: 20 TABLET, FILM COATED ORAL at 20:18

## 2025-07-27 RX ADMIN — HYDROMORPHONE HYDROCHLORIDE 1 MG: 1 INJECTION, SOLUTION INTRAMUSCULAR; INTRAVENOUS; SUBCUTANEOUS at 17:58

## 2025-07-27 RX ADMIN — Medication 2000 UNITS: at 09:18

## 2025-07-27 RX ADMIN — MEROPENEM 1000 MG: 1 INJECTION INTRAVENOUS at 20:18

## 2025-07-27 RX ADMIN — MEROPENEM 1000 MG: 1 INJECTION INTRAVENOUS at 03:27

## 2025-07-27 RX ADMIN — HYDROCHLOROTHIAZIDE 25 MG: 25 TABLET ORAL at 09:18

## 2025-07-27 RX ADMIN — HYDROMORPHONE HYDROCHLORIDE 1 MG: 1 INJECTION, SOLUTION INTRAMUSCULAR; INTRAVENOUS; SUBCUTANEOUS at 23:23

## 2025-07-27 RX ADMIN — SODIUM CHLORIDE, PRESERVATIVE FREE 10 ML: 5 INJECTION INTRAVENOUS at 09:20

## 2025-07-27 RX ADMIN — ACETAMINOPHEN 650 MG: 325 TABLET ORAL at 23:23

## 2025-07-27 RX ADMIN — FLUOXETINE HYDROCHLORIDE 40 MG: 20 CAPSULE ORAL at 09:18

## 2025-07-27 ASSESSMENT — PAIN SCALES - GENERAL
PAINLEVEL_OUTOF10: 4
PAINLEVEL_OUTOF10: 0
PAINLEVEL_OUTOF10: 7
PAINLEVEL_OUTOF10: 0
PAINLEVEL_OUTOF10: 7
PAINLEVEL_OUTOF10: 0

## 2025-07-27 ASSESSMENT — PAIN DESCRIPTION - LOCATION
LOCATION: ABDOMEN
LOCATION: ABDOMEN

## 2025-07-27 ASSESSMENT — PAIN DESCRIPTION - ORIENTATION: ORIENTATION: LOWER

## 2025-07-27 NOTE — PLAN OF CARE
Problem: Discharge Planning  Goal: Discharge to home or other facility with appropriate resources  Outcome: Progressing  Flowsheets (Taken 7/27/2025 0907)  Discharge to home or other facility with appropriate resources: Identify barriers to discharge with patient and caregiver     Problem: Pain  Goal: Verbalizes/displays adequate comfort level or baseline comfort level  Outcome: Progressing  Flowsheets (Taken 7/27/2025 0801)  Verbalizes/displays adequate comfort level or baseline comfort level:   Encourage patient to monitor pain and request assistance   Assess pain using appropriate pain scale     Problem: Skin/Tissue Integrity  Goal: Skin integrity remains intact  Description: 1.  Monitor for areas of redness and/or skin breakdown  2.  Assess vascular access sites hourly  3.  Every 4-6 hours minimum:  Change oxygen saturation probe site  4.  Every 4-6 hours:  If on nasal continuous positive airway pressure, respiratory therapy assess nares and determine need for appliance change or resting period  Outcome: Progressing  Flowsheets (Taken 7/27/2025 0907)  Skin Integrity Remains Intact: Monitor for areas of redness and/or skin breakdown     Problem: Safety - Adult  Goal: Free from fall injury  Outcome: Progressing

## 2025-07-27 NOTE — PROGRESS NOTES
Department of General Surgery - Adult  Surgical Service    Progress Note      SUBJECTIVE:  Afebrile. Abdominal pain improved. Tolerating solid food. Only 2 loose stools overnight which is greatly improved.    OBJECTIVE      Physical    VITALS:  /69   Pulse 65   Temp 97.7 °F (36.5 °C) (Oral)   Resp 18   Ht 1.549 m (5' 0.98\")   Wt 99.8 kg (220 lb 0.3 oz)   SpO2 97%   BMI 41.59 kg/m²   INTAKE/OUTPUT:    Intake/Output Summary (Last 24 hours) at 7/27/2025 0846  Last data filed at 7/27/2025 0550  Gross per 24 hour   Intake 2245.62 ml   Output 1900 ml   Net 345.62 ml     NAD  NCAT  No scleral icterus  Trachea midline  Normal WOB on RA  Abd soft, tender to deep palpation in LUQ, LLQ, RLQ. No rebound or guarding. Improved from yesterday.  Ext WWP  Grossly neuro intact  No jaundice  Affect appropriate     Data  Reviewed.     Current Inpatient Medications    Reviewed.     ASSESSMENT AND PLAN    This is a 60-year-old female with history of Crohn's colitis who presents with a flare vs sigmoid diverticulitis and has a small pelvic abscess measuring 3 cm in size.  Abscess is too small for percutaneous drainage.      Meropeneum. Discussed with ID given her allergies. Continue IV antibiotics today as she is finally showing some clinical improvement  GI consult  Diet as tolerated.  Serial abdominal exams  DVT prophylaxis  If ongoing symptoms without improvement on antibiotic therapy, may require OR. Will repeat CT in 24-48h if no improvement    Laila Pascual MD   July 27, 2025

## 2025-07-28 ENCOUNTER — APPOINTMENT (OUTPATIENT)
Facility: HOSPITAL | Age: 60
End: 2025-07-28
Payer: MEDICARE

## 2025-07-28 LAB
ALBUMIN SERPL-MCNC: 2.1 G/DL (ref 3.5–5)
ALBUMIN/GLOB SERPL: 0.6 (ref 1.1–2.2)
ALP SERPL-CCNC: 174 U/L (ref 45–117)
ALT SERPL-CCNC: 50 U/L (ref 12–78)
ANION GAP SERPL CALC-SCNC: 4 MMOL/L (ref 2–12)
AST SERPL W P-5'-P-CCNC: 43 U/L (ref 15–37)
BILIRUB SERPL-MCNC: 0.2 MG/DL (ref 0.2–1)
BUN SERPL-MCNC: 2 MG/DL (ref 6–20)
BUN/CREAT SERPL: 3 (ref 12–20)
C DIFF GDH STL QL: NEGATIVE
C DIFF TOX A+B STL QL IA: NEGATIVE
C DIFF TOXIN INTERPRETATION: NORMAL
CA-I BLD-MCNC: 8.6 MG/DL (ref 8.5–10.1)
CHLORIDE SERPL-SCNC: 105 MMOL/L (ref 97–108)
CO2 SERPL-SCNC: 27 MMOL/L (ref 21–32)
CREAT SERPL-MCNC: 0.72 MG/DL (ref 0.55–1.02)
GLOBULIN SER CALC-MCNC: 3.8 G/DL (ref 2–4)
GLUCOSE SERPL-MCNC: 92 MG/DL (ref 65–100)
POTASSIUM SERPL-SCNC: 4.8 MMOL/L (ref 3.5–5.1)
PROT SERPL-MCNC: 5.9 G/DL (ref 6.4–8.2)
SODIUM SERPL-SCNC: 136 MMOL/L (ref 136–145)

## 2025-07-28 PROCEDURE — 2500000003 HC RX 250 WO HCPCS: Performed by: SURGERY

## 2025-07-28 PROCEDURE — 6360000002 HC RX W HCPCS: Performed by: STUDENT IN AN ORGANIZED HEALTH CARE EDUCATION/TRAINING PROGRAM

## 2025-07-28 PROCEDURE — 6370000000 HC RX 637 (ALT 250 FOR IP): Performed by: STUDENT IN AN ORGANIZED HEALTH CARE EDUCATION/TRAINING PROGRAM

## 2025-07-28 PROCEDURE — 99232 SBSQ HOSP IP/OBS MODERATE 35: CPT | Performed by: SURGERY

## 2025-07-28 PROCEDURE — 6360000002 HC RX W HCPCS: Performed by: SURGERY

## 2025-07-28 PROCEDURE — 87324 CLOSTRIDIUM AG IA: CPT

## 2025-07-28 PROCEDURE — 74177 CT ABD & PELVIS W/CONTRAST: CPT

## 2025-07-28 PROCEDURE — 36415 COLL VENOUS BLD VENIPUNCTURE: CPT

## 2025-07-28 PROCEDURE — 2580000003 HC RX 258: Performed by: STUDENT IN AN ORGANIZED HEALTH CARE EDUCATION/TRAINING PROGRAM

## 2025-07-28 PROCEDURE — 1100000000 HC RM PRIVATE

## 2025-07-28 PROCEDURE — 99232 SBSQ HOSP IP/OBS MODERATE 35: CPT | Performed by: INTERNAL MEDICINE

## 2025-07-28 PROCEDURE — 80053 COMPREHEN METABOLIC PANEL: CPT

## 2025-07-28 PROCEDURE — 6360000004 HC RX CONTRAST MEDICATION: Performed by: SURGERY

## 2025-07-28 PROCEDURE — 6370000000 HC RX 637 (ALT 250 FOR IP): Performed by: SURGERY

## 2025-07-28 PROCEDURE — 87449 NOS EACH ORGANISM AG IA: CPT

## 2025-07-28 PROCEDURE — 2500000003 HC RX 250 WO HCPCS: Performed by: STUDENT IN AN ORGANIZED HEALTH CARE EDUCATION/TRAINING PROGRAM

## 2025-07-28 RX ORDER — IOPAMIDOL 755 MG/ML
100 INJECTION, SOLUTION INTRAVASCULAR
Status: COMPLETED | OUTPATIENT
Start: 2025-07-28 | End: 2025-07-28

## 2025-07-28 RX ORDER — HYDROMORPHONE HYDROCHLORIDE 1 MG/ML
1 INJECTION, SOLUTION INTRAMUSCULAR; INTRAVENOUS; SUBCUTANEOUS EVERY 4 HOURS PRN
Refills: 0 | Status: DISCONTINUED | OUTPATIENT
Start: 2025-07-28 | End: 2025-07-30

## 2025-07-28 RX ADMIN — ACETAMINOPHEN 650 MG: 325 TABLET ORAL at 18:14

## 2025-07-28 RX ADMIN — METOPROLOL TARTRATE 50 MG: 50 TABLET, FILM COATED ORAL at 10:21

## 2025-07-28 RX ADMIN — IOPAMIDOL 100 ML: 755 INJECTION, SOLUTION INTRAVENOUS at 18:03

## 2025-07-28 RX ADMIN — ENOXAPARIN SODIUM 40 MG: 100 INJECTION SUBCUTANEOUS at 10:22

## 2025-07-28 RX ADMIN — HYDROMORPHONE HYDROCHLORIDE 1 MG: 1 INJECTION, SOLUTION INTRAMUSCULAR; INTRAVENOUS; SUBCUTANEOUS at 23:36

## 2025-07-28 RX ADMIN — HYDROCHLOROTHIAZIDE 25 MG: 25 TABLET ORAL at 10:21

## 2025-07-28 RX ADMIN — Medication 2000 UNITS: at 10:21

## 2025-07-28 RX ADMIN — SODIUM CHLORIDE, PRESERVATIVE FREE 10 ML: 5 INJECTION INTRAVENOUS at 10:23

## 2025-07-28 RX ADMIN — SODIUM CHLORIDE, PRESERVATIVE FREE 10 ML: 5 INJECTION INTRAVENOUS at 21:25

## 2025-07-28 RX ADMIN — FLUOXETINE HYDROCHLORIDE 40 MG: 20 CAPSULE ORAL at 11:29

## 2025-07-28 RX ADMIN — ACETAMINOPHEN 650 MG: 325 TABLET ORAL at 23:58

## 2025-07-28 RX ADMIN — ASPIRIN 81 MG: 81 TABLET, CHEWABLE ORAL at 10:21

## 2025-07-28 RX ADMIN — ROSUVASTATIN CALCIUM 40 MG: 20 TABLET, FILM COATED ORAL at 21:25

## 2025-07-28 RX ADMIN — MEROPENEM 1000 MG: 1 INJECTION INTRAVENOUS at 10:21

## 2025-07-28 RX ADMIN — PANTOPRAZOLE SODIUM 40 MG: 40 TABLET, DELAYED RELEASE ORAL at 05:55

## 2025-07-28 RX ADMIN — DEXTROSE MONOHYDRATE, SODIUM CHLORIDE, AND POTASSIUM CHLORIDE: 50; 1.49; 4.5 INJECTION, SOLUTION INTRAVENOUS at 23:57

## 2025-07-28 RX ADMIN — ACETAMINOPHEN 650 MG: 325 TABLET ORAL at 11:29

## 2025-07-28 RX ADMIN — MEROPENEM 1000 MG: 1 INJECTION INTRAVENOUS at 18:12

## 2025-07-28 RX ADMIN — MEROPENEM 1000 MG: 1 INJECTION INTRAVENOUS at 02:47

## 2025-07-28 RX ADMIN — ACETAMINOPHEN 650 MG: 325 TABLET ORAL at 05:55

## 2025-07-28 ASSESSMENT — PAIN DESCRIPTION - LOCATION
LOCATION: ABDOMEN

## 2025-07-28 ASSESSMENT — PAIN DESCRIPTION - DESCRIPTORS
DESCRIPTORS: ACHING
DESCRIPTORS: ACHING
DESCRIPTORS: CRAMPING;ACHING

## 2025-07-28 ASSESSMENT — PAIN SCALES - GENERAL
PAINLEVEL_OUTOF10: 4
PAINLEVEL_OUTOF10: 7
PAINLEVEL_OUTOF10: 5
PAINLEVEL_OUTOF10: 0

## 2025-07-28 ASSESSMENT — PAIN - FUNCTIONAL ASSESSMENT: PAIN_FUNCTIONAL_ASSESSMENT: ACTIVITIES ARE NOT PREVENTED

## 2025-07-28 ASSESSMENT — PAIN DESCRIPTION - ORIENTATION
ORIENTATION: LOWER
ORIENTATION: LOWER

## 2025-07-28 NOTE — CARE COORDINATION
CM reviewed chart. Awaiting surgery recs once CT abd/pelvis completed. Patient now on regular diet.     ID also following.     DCP: home self care once clinically stable.

## 2025-07-28 NOTE — PLAN OF CARE
Problem: Discharge Planning  Goal: Discharge to home or other facility with appropriate resources  7/28/2025 0125 by Linda Lerner RN  Outcome: Progressing  7/27/2025 1325 by Venita Mendoza RN  Outcome: Progressing  Flowsheets (Taken 7/27/2025 0907)  Discharge to home or other facility with appropriate resources: Identify barriers to discharge with patient and caregiver     Problem: Pain  Goal: Verbalizes/displays adequate comfort level or baseline comfort level  7/28/2025 0125 by Linda Lerner RN  Outcome: Progressing  7/27/2025 1325 by Venita Mendoza RN  Outcome: Progressing  Flowsheets (Taken 7/27/2025 0801)  Verbalizes/displays adequate comfort level or baseline comfort level:   Encourage patient to monitor pain and request assistance   Assess pain using appropriate pain scale     Problem: Skin/Tissue Integrity  Goal: Skin integrity remains intact  Description: 1.  Monitor for areas of redness and/or skin breakdown  2.  Assess vascular access sites hourly  3.  Every 4-6 hours minimum:  Change oxygen saturation probe site  4.  Every 4-6 hours:  If on nasal continuous positive airway pressure, respiratory therapy assess nares and determine need for appliance change or resting period  7/28/2025 0125 by Linda Lerner RN  Outcome: Progressing  7/27/2025 1325 by Venita Mendoza RN  Outcome: Progressing  Flowsheets (Taken 7/27/2025 0907)  Skin Integrity Remains Intact: Monitor for areas of redness and/or skin breakdown     Problem: Safety - Adult  Goal: Free from fall injury  7/28/2025 0125 by Linda Lerner RN  Outcome: Progressing  7/27/2025 1325 by Venita Mendoza RN  Outcome: Progressing

## 2025-07-28 NOTE — PLAN OF CARE
Problem: Discharge Planning  Goal: Discharge to home or other facility with appropriate resources  7/28/2025 1500 by Donovan Sparks RN  Outcome: Progressing  7/28/2025 0125 by Linda Lerner RN  Outcome: Progressing     Problem: Pain  Goal: Verbalizes/displays adequate comfort level or baseline comfort level  7/28/2025 1500 by Donovan Sparks RN  Outcome: Progressing  7/28/2025 0125 by Linda Lerner RN  Outcome: Progressing     Problem: Skin/Tissue Integrity  Goal: Skin integrity remains intact  Description: 1.  Monitor for areas of redness and/or skin breakdown  2.  Assess vascular access sites hourly  3.  Every 4-6 hours minimum:  Change oxygen saturation probe site  4.  Every 4-6 hours:  If on nasal continuous positive airway pressure, respiratory therapy assess nares and determine need for appliance change or resting period  7/28/2025 1500 by Donovan Sparks RN  Outcome: Progressing  7/28/2025 0125 by Linda Lerner RN  Outcome: Progressing     Problem: Safety - Adult  Goal: Free from fall injury  7/28/2025 1500 by Donovan Sparks RN  Outcome: Progressing  7/28/2025 0125 by Linda Lerner RN  Outcome: Progressing

## 2025-07-28 NOTE — PROGRESS NOTES
PROGRESS NOTE      Chief Complaints:  No new complaint.  HPI and  Objective:    Still having some diarrhea.  No fever.  Review of Systems:  Rest of review of system reviewed personally and they are negative.    EXAM:  BP (!) 115/51   Pulse 71   Temp 98.7 °F (37.1 °C) (Oral)   Resp 18   Ht 1.549 m (5' 0.98\")   Wt 101.2 kg (223 lb 1.7 oz)   SpO2 99%   BMI 42.18 kg/m²     Patient is awake   Head and neck atraumatic, normocephalic.  ENT: No hoarse voice, gaze appropriate.  Cardiac system regular rate rhythm.  Pulmonary no audible wheeze, no cyanosis.  Chest wall excursion normal with respiration cycle  Abdomen is soft not particularly distended.  Neurologically nonfocal.  Hematology system: No bruising noted.  Musculoskeletal system: No joint deformity noted.  Genitourinary system: No hematuria noted.  Skin is warm and moist.  Psychosocial: Cooperative.  Vascular examination as previously noted no changes.    Current Facility-Administered Medications   Medication Dose Route Frequency Provider Last Rate Last Admin    Petrolatum-Zinc Oxide ointment   Topical PRN Laila Pascual MD        HYDROmorphone HCl PF (DILAUDID) injection 0.5 mg  0.5 mg IntraVENous Q4H PRN Laila Pascual MD        HYDROmorphone HCl PF (DILAUDID) injection 1 mg  1 mg IntraVENous Q4H PRN Laila Pascual MD   1 mg at 07/27/25 2323    meropenem (MERREM) 1,000 mg in sodium chloride 0.9 % 100 mL IVPB (addEASE)  1,000 mg IntraVENous Q8H Laila Pascual MD   Stopped at 07/28/25 0547    diatrizoate meglumine-sodium (GASTROGRAFIN) 66-10 % solution 30 mL  30 mL Oral ONCE PRN Laila Pascual MD   30 mL at 07/23/25 1012    ondansetron (ZOFRAN) injection 4 mg  4 mg IntraVENous Q6H PRN Abrahan Alves MD        dextrose 5 % and 0.45 % NaCl with KCl 20 mEq infusion   IntraVENous Continuous Laila Pascual MD 50 mL/hr at 07/27/25 1759 New Bag at 07/27/25 1759    sodium chloride flush 0.9 % injection 5-40 mL  5-40 mL

## 2025-07-28 NOTE — PROGRESS NOTES
Progress Note  Date:2025       Room:[facility]  Patient Name:Dayan Lyons     YOB: 1965     Age:60 y.o.        Subjective    Subjective Patient followed for intra-abdominopelvic abscess secondary to sigmoid diverticulitis. Also has underlying Crohn's disease with uncharacteristic diarrhea. C. Difficile toxin was negative. She is resting comfortably at this time with no abdominal pain.       Objective         Vitals Last 24 Hours:  TEMPERATURE:  Temp  Av.2 °F (36.8 °C)  Min: 97.7 °F (36.5 °C)  Max: 98.7 °F (37.1 °C)  RESPIRATIONS RANGE: Resp  Av.2  Min: 16  Max: 18  PULSE OXIMETRY RANGE: SpO2  Av.7 %  Min: 98 %  Max: 99 %  PULSE RANGE: Pulse  Av.3  Min: 61  Max: 71  BLOOD PRESSURE RANGE: Systolic (24hrs), Av , Min:112 , Max:115   ; Diastolic (24hrs), Av, Min:51, Max:55         Objective:  Vital signs: (most recent): Blood pressure (!) 115/51, pulse 71, temperature 98.7 °F (37.1 °C), temperature source Oral, resp. rate 18, height 1.549 m (5' 0.98\"), weight 101.2 kg (223 lb 1.7 oz), SpO2 99%.      Vitals and nursing note reviewed.   Constitutional:       Appearance: She is obese. She is ill-appearing.   HENT:      Head: Normocephalic and atraumatic.      Right Ear: External ear normal.      Left Ear: External ear normal.      Nose: Nose normal.      Mouth/Throat:      Pharynx: Oropharynx is clear.   Eyes:      Extraocular Movements: Extraocular movements intact.      Pupils: Pupils are equal, round, and reactive to light.   Cardiovascular:      Rate and Rhythm: Normal rate and regular rhythm.      Pulses: Normal pulses.      Heart sounds: Normal heart sounds.   Abdominal:      General: Bowel sounds are normal. There is no distension.      Palpations: Abdomen is soft.      Tenderness: There is abdominal tenderness. There is no right CVA tenderness, left CVA tenderness or guarding.   Genitourinary:     Comments: No Gonzalez catheter  Musculoskeletal:      Cervical back: Neck

## 2025-07-29 ENCOUNTER — APPOINTMENT (OUTPATIENT)
Facility: HOSPITAL | Age: 60
End: 2025-07-29
Attending: SURGERY
Payer: MEDICARE

## 2025-07-29 LAB
ALBUMIN SERPL-MCNC: 2.2 G/DL (ref 3.5–5)
ALBUMIN/GLOB SERPL: 0.6 (ref 1.1–2.2)
ALP SERPL-CCNC: 249 U/L (ref 45–117)
ALT SERPL-CCNC: 53 U/L (ref 12–78)
ANION GAP SERPL CALC-SCNC: 6 MMOL/L (ref 2–12)
AST SERPL W P-5'-P-CCNC: 58 U/L (ref 15–37)
BASOPHILS # BLD: 0.01 K/UL (ref 0–0.1)
BASOPHILS NFR BLD: 0.2 % (ref 0–1)
BILIRUB SERPL-MCNC: 0.3 MG/DL (ref 0.2–1)
BUN SERPL-MCNC: 4 MG/DL (ref 6–20)
BUN/CREAT SERPL: 6 (ref 12–20)
CA-I BLD-MCNC: 8.9 MG/DL (ref 8.5–10.1)
CHLORIDE SERPL-SCNC: 100 MMOL/L (ref 97–108)
CO2 SERPL-SCNC: 28 MMOL/L (ref 21–32)
CREAT SERPL-MCNC: 0.62 MG/DL (ref 0.55–1.02)
CRP SERPL-MCNC: 2.07 MG/DL (ref 0–0.3)
DIFFERENTIAL METHOD BLD: ABNORMAL
EOSINOPHIL # BLD: 0.16 K/UL (ref 0–0.4)
EOSINOPHIL NFR BLD: 3.4 % (ref 0–7)
ERYTHROCYTE [DISTWIDTH] IN BLOOD BY AUTOMATED COUNT: 14.2 % (ref 11.5–14.5)
GLOBULIN SER CALC-MCNC: 4 G/DL (ref 2–4)
GLUCOSE SERPL-MCNC: 87 MG/DL (ref 65–100)
HCT VFR BLD AUTO: 29.8 % (ref 35–47)
HGB BLD-MCNC: 9.4 G/DL (ref 11.5–16)
IMM GRANULOCYTES # BLD AUTO: 0.02 K/UL (ref 0–0.04)
IMM GRANULOCYTES NFR BLD AUTO: 0.4 % (ref 0–0.5)
LYMPHOCYTES # BLD: 0.8 K/UL (ref 0.8–3.5)
LYMPHOCYTES NFR BLD: 16.9 % (ref 12–49)
MCH RBC QN AUTO: 28.8 PG (ref 26–34)
MCHC RBC AUTO-ENTMCNC: 31.5 G/DL (ref 30–36.5)
MCV RBC AUTO: 91.4 FL (ref 80–99)
MONOCYTES # BLD: 0.38 K/UL (ref 0–1)
MONOCYTES NFR BLD: 8 % (ref 5–13)
NEUTS SEG # BLD: 3.37 K/UL (ref 1.8–8)
NEUTS SEG NFR BLD: 71.1 % (ref 32–75)
NRBC # BLD: 0 K/UL (ref 0–0.01)
NRBC BLD-RTO: 0 PER 100 WBC
PLATELET # BLD AUTO: 294 K/UL (ref 150–400)
PMV BLD AUTO: 10.3 FL (ref 8.9–12.9)
POTASSIUM SERPL-SCNC: 4.2 MMOL/L (ref 3.5–5.1)
PROCALCITONIN SERPL-MCNC: <0.05 NG/ML
PROT SERPL-MCNC: 6.2 G/DL (ref 6.4–8.2)
RBC # BLD AUTO: 3.26 M/UL (ref 3.8–5.2)
SODIUM SERPL-SCNC: 134 MMOL/L (ref 136–145)
WBC # BLD AUTO: 4.7 K/UL (ref 3.6–11)

## 2025-07-29 PROCEDURE — 87076 CULTURE ANAEROBE IDENT EACH: CPT

## 2025-07-29 PROCEDURE — 87205 SMEAR GRAM STAIN: CPT

## 2025-07-29 PROCEDURE — 0D9N3ZZ DRAINAGE OF SIGMOID COLON, PERCUTANEOUS APPROACH: ICD-10-PCS | Performed by: STUDENT IN AN ORGANIZED HEALTH CARE EDUCATION/TRAINING PROGRAM

## 2025-07-29 PROCEDURE — 87070 CULTURE OTHR SPECIMN AEROBIC: CPT

## 2025-07-29 PROCEDURE — 99156 MOD SED OTH PHYS/QHP 5/>YRS: CPT

## 2025-07-29 PROCEDURE — 6360000002 HC RX W HCPCS: Performed by: STUDENT IN AN ORGANIZED HEALTH CARE EDUCATION/TRAINING PROGRAM

## 2025-07-29 PROCEDURE — 80053 COMPREHEN METABOLIC PANEL: CPT

## 2025-07-29 PROCEDURE — 2500000003 HC RX 250 WO HCPCS: Performed by: SURGERY

## 2025-07-29 PROCEDURE — C1729 CATH, DRAINAGE: HCPCS

## 2025-07-29 PROCEDURE — 1100000000 HC RM PRIVATE

## 2025-07-29 PROCEDURE — 99152 MOD SED SAME PHYS/QHP 5/>YRS: CPT

## 2025-07-29 PROCEDURE — 85025 COMPLETE CBC W/AUTO DIFF WBC: CPT

## 2025-07-29 PROCEDURE — 87449 NOS EACH ORGANISM AG IA: CPT

## 2025-07-29 PROCEDURE — 2580000003 HC RX 258: Performed by: STUDENT IN AN ORGANIZED HEALTH CARE EDUCATION/TRAINING PROGRAM

## 2025-07-29 PROCEDURE — 84145 PROCALCITONIN (PCT): CPT

## 2025-07-29 PROCEDURE — 6370000000 HC RX 637 (ALT 250 FOR IP): Performed by: SURGERY

## 2025-07-29 PROCEDURE — 86140 C-REACTIVE PROTEIN: CPT

## 2025-07-29 PROCEDURE — 6360000002 HC RX W HCPCS: Performed by: SURGERY

## 2025-07-29 PROCEDURE — 36415 COLL VENOUS BLD VENIPUNCTURE: CPT

## 2025-07-29 PROCEDURE — 99232 SBSQ HOSP IP/OBS MODERATE 35: CPT | Performed by: SURGERY

## 2025-07-29 PROCEDURE — 99232 SBSQ HOSP IP/OBS MODERATE 35: CPT | Performed by: INTERNAL MEDICINE

## 2025-07-29 PROCEDURE — 6370000000 HC RX 637 (ALT 250 FOR IP): Performed by: STUDENT IN AN ORGANIZED HEALTH CARE EDUCATION/TRAINING PROGRAM

## 2025-07-29 RX ORDER — MIDAZOLAM HYDROCHLORIDE 1 MG/ML
INJECTION, SOLUTION INTRAMUSCULAR; INTRAVENOUS PRN
Status: COMPLETED | OUTPATIENT
Start: 2025-07-29 | End: 2025-07-29

## 2025-07-29 RX ORDER — FENTANYL CITRATE 50 UG/ML
INJECTION, SOLUTION INTRAMUSCULAR; INTRAVENOUS PRN
Status: COMPLETED | OUTPATIENT
Start: 2025-07-29 | End: 2025-07-29

## 2025-07-29 RX ADMIN — PANTOPRAZOLE SODIUM 40 MG: 40 TABLET, DELAYED RELEASE ORAL at 06:20

## 2025-07-29 RX ADMIN — MEROPENEM 1000 MG: 1 INJECTION INTRAVENOUS at 14:43

## 2025-07-29 RX ADMIN — HYDROMORPHONE HYDROCHLORIDE 1 MG: 1 INJECTION, SOLUTION INTRAMUSCULAR; INTRAVENOUS; SUBCUTANEOUS at 23:05

## 2025-07-29 RX ADMIN — HYDROMORPHONE HYDROCHLORIDE 1 MG: 1 INJECTION, SOLUTION INTRAMUSCULAR; INTRAVENOUS; SUBCUTANEOUS at 06:30

## 2025-07-29 RX ADMIN — ENOXAPARIN SODIUM 40 MG: 100 INJECTION SUBCUTANEOUS at 14:42

## 2025-07-29 RX ADMIN — FENTANYL CITRATE 25 MCG: 50 INJECTION INTRAMUSCULAR; INTRAVENOUS at 13:09

## 2025-07-29 RX ADMIN — FENTANYL CITRATE 50 MCG: 50 INJECTION INTRAMUSCULAR; INTRAVENOUS at 13:16

## 2025-07-29 RX ADMIN — HYDROCHLOROTHIAZIDE 25 MG: 25 TABLET ORAL at 14:41

## 2025-07-29 RX ADMIN — MEROPENEM 1000 MG: 1 INJECTION INTRAVENOUS at 03:15

## 2025-07-29 RX ADMIN — METOPROLOL TARTRATE 50 MG: 50 TABLET, FILM COATED ORAL at 14:41

## 2025-07-29 RX ADMIN — ACETAMINOPHEN 650 MG: 325 TABLET ORAL at 06:19

## 2025-07-29 RX ADMIN — ACETAMINOPHEN 650 MG: 325 TABLET ORAL at 23:57

## 2025-07-29 RX ADMIN — MIDAZOLAM 0.5 MG: 1 INJECTION INTRAMUSCULAR; INTRAVENOUS at 13:09

## 2025-07-29 RX ADMIN — HYDROMORPHONE HYDROCHLORIDE 1 MG: 1 INJECTION, SOLUTION INTRAMUSCULAR; INTRAVENOUS; SUBCUTANEOUS at 14:49

## 2025-07-29 RX ADMIN — ACETAMINOPHEN 650 MG: 325 TABLET ORAL at 14:41

## 2025-07-29 RX ADMIN — ROSUVASTATIN CALCIUM 40 MG: 20 TABLET, FILM COATED ORAL at 22:30

## 2025-07-29 RX ADMIN — Medication 2000 UNITS: at 14:40

## 2025-07-29 RX ADMIN — SODIUM CHLORIDE, PRESERVATIVE FREE 10 ML: 5 INJECTION INTRAVENOUS at 14:58

## 2025-07-29 RX ADMIN — FENTANYL CITRATE 25 MCG: 50 INJECTION INTRAMUSCULAR; INTRAVENOUS at 13:12

## 2025-07-29 RX ADMIN — MIDAZOLAM 1 MG: 1 INJECTION INTRAMUSCULAR; INTRAVENOUS at 13:16

## 2025-07-29 RX ADMIN — MEROPENEM 1000 MG: 1 INJECTION INTRAVENOUS at 23:21

## 2025-07-29 RX ADMIN — ASPIRIN 81 MG: 81 TABLET, CHEWABLE ORAL at 14:41

## 2025-07-29 RX ADMIN — FLUOXETINE HYDROCHLORIDE 40 MG: 20 CAPSULE ORAL at 15:03

## 2025-07-29 RX ADMIN — MIDAZOLAM 0.5 MG: 1 INJECTION INTRAMUSCULAR; INTRAVENOUS at 13:12

## 2025-07-29 ASSESSMENT — PAIN SCALES - GENERAL
PAINLEVEL_OUTOF10: 6
PAINLEVEL_OUTOF10: 7
PAINLEVEL_OUTOF10: 3
PAINLEVEL_OUTOF10: 7
PAINLEVEL_OUTOF10: 7
PAINLEVEL_OUTOF10: 0
PAINLEVEL_OUTOF10: 7
PAINLEVEL_OUTOF10: 7
PAINLEVEL_OUTOF10: 5
PAINLEVEL_OUTOF10: 0
PAINLEVEL_OUTOF10: 0
PAINLEVEL_OUTOF10: 5

## 2025-07-29 ASSESSMENT — PAIN DESCRIPTION - ORIENTATION
ORIENTATION: RIGHT
ORIENTATION: LEFT
ORIENTATION: LOWER
ORIENTATION: LOWER
ORIENTATION: LOWER;LEFT;MID
ORIENTATION: LOWER
ORIENTATION: LEFT;MID;LOWER

## 2025-07-29 ASSESSMENT — PAIN DESCRIPTION - DESCRIPTORS
DESCRIPTORS: ACHING

## 2025-07-29 ASSESSMENT — PAIN DESCRIPTION - LOCATION
LOCATION: ABDOMEN

## 2025-07-29 ASSESSMENT — PAIN - FUNCTIONAL ASSESSMENT
PAIN_FUNCTIONAL_ASSESSMENT: ACTIVITIES ARE NOT PREVENTED

## 2025-07-29 NOTE — BRIEF OP NOTE
Brief Postoperative Note    Dayan Lyons  YOB: 1965  [id number]    Pre-operative Diagnosis: Diverticular abscess    Post-operative Diagnosis: Same    Procedure: Diverticular abscess aspiration    Anesthesia: Local and Moderate Sedation    Surgeons/Assistants: MICHA Mina MD     Estimated Blood Loss: less than 50     Complications: None    Specimens: Was Obtained: 7cc purulent fluid    Findings: Successful diverticular abscess aspiration yielding 7cc purulent fluid. Post procedure scan showed significant reduction in size of fluid collection.    Electronically signed by Malcolm Mina MD on 7/29/2025 at 1:33 PM

## 2025-07-29 NOTE — PROGRESS NOTES
Progress Note  Date:2025       Room:[facility]  Patient Name:Dayan Lyons     YOB: 1965     Age:60 y.o.        Subjective    Subjective Patient followed for intra-abdominopelvic abscess secondary to sigmoid diverticulitis. Also has underlying Crohn's disease with uncharacteristic diarrhea. C. Difficile toxin was negative.  Earlier today she underwent CT guided abscess of pelvic abscess with culture pending. Remains afebrile with decreasing CRP.     Objective         Vitals Last 24 Hours:  TEMPERATURE:  Temp  Av.1 °F (36.7 °C)  Min: 97.9 °F (36.6 °C)  Max: 98.3 °F (36.8 °C)  RESPIRATIONS RANGE: Resp  Av.3  Min: 13  Max: 18  PULSE OXIMETRY RANGE: SpO2  Av.9 %  Min: 96 %  Max: 100 %  PULSE RANGE: Pulse  Av  Min: 57  Max: 74  BLOOD PRESSURE RANGE: Systolic (24hrs), Av , Min:102 , Max:156   ; Diastolic (24hrs), Av, Min:45, Max:78         Objective:  Vital signs: (most recent): Blood pressure (!) 118/54, pulse 57, temperature 97.9 °F (36.6 °C), temperature source Oral, resp. rate 18, height 1.549 m (5' 0.98\"), weight 101.2 kg (223 lb 1.7 oz), SpO2 96%.      Vitals and nursing note reviewed.   Constitutional:       Appearance: She is obese. She is ill-appearing.   HENT:      Head: Normocephalic and atraumatic.      Right Ear: External ear normal.      Left Ear: External ear normal.      Nose: Nose normal.      Mouth/Throat:      Pharynx: Oropharynx is clear.   Eyes:      Extraocular Movements: Extraocular movements intact.      Pupils: Pupils are equal, round, and reactive to light.   Cardiovascular:      Rate and Rhythm: Normal rate and regular rhythm.      Pulses: Normal pulses.      Heart sounds: Normal heart sounds.   Abdominal:      General: Bowel sounds are normal. There is no distension.      Palpations: Abdomen is soft.      Tenderness: There is abdominal tenderness. There is no right CVA tenderness, left CVA tenderness or guarding.   Genitourinary:     Comments: No

## 2025-07-29 NOTE — PROGRESS NOTES
Patient:Dereck Bowles  MRN:[id number]  :1969      Referring Physician:  Ravin Sanabria MD  [phone]    History of Present Illness:     Mr. Carranza is a 47 year old male who presents for follow up. He has a significant past medical history of dyslipidemia and long standing chewing tobacco use. In 2016 he underwent a coronary calcium CT scan that revealed a total score of 1953 angstroms with a GARCIA arterial age of 94 years increasing his 10 year risk score to 30%.     Echocardiogram was free of major structural heart disease; however, nuclear stress test was positive for inferior wall ischemia with non-specific EKG changes with exercise.     Cardiac cath revealed critical RCA and PDA disease requiring PCI.   19:   Multiple records hospital note cardiac cath notes and other radiographic studies reviewed:   49-year-old male comes in for establishing new cardiovascular care.  He has a history of established coronary disease and after reviewing the charts we find that he had a great lesions in his RCA at the midportion and the ostium of the PDA some disease in his LAD also a very high coronary calcium score with most of the calcium in the LAD and the RCA.  He did well with the procedure and has been compliant his medication but pretty remiss in his medical follow-ups.  He is actually been gaining weight since the procedure.  He denies any chest pain but does note that he has had a recent increase in getting short of breath when he does strenuous activities.  Additionally he has been very attentive to his diet.  Past Medical History:   Diagnosis Date   • Dyslipidemia    • History of heart artery stent    • Right foot pain    • Word finding problem    History of gout  Past history as noted in HPI above  Past Surgical History:   Procedure Laterality Date   • Coronary artery bypass graft     • Stent implant       Family History   Problem Relation Age of Onset   • Myocardial Infarction Other    •  Comprehensive Nutrition Assessment    Type and Reason for Visit:  Initial, Positive nutrition screen    Nutrition Recommendations/Plan:   Advance diet as medically appropriate.     Malnutrition Assessment:  Malnutrition Status:  No malnutrition (07/25/25 1018)    Context:  Acute Illness       Nutrition Assessment:    (7/29) Update: Diet advanced to Regular, intake fair at 25-75%,currently NPO for a procedure. Weight up since last review, no edema noted. Labs reviewed, Phos depleted. Meds reviewed.     Screened for Clear Liquid diet x 4 days, no documented intake. Will add a clear ONS to diet order. Current weight down significantly in past 5 months at 12%, continues above accpetable BMI. Reports poor po PTA, abd pain. Labs reviewed, Mag and Phos depleted, no noted repletion, receives IVH at 100 ml/hr.    Nutrition Related Findings:      Wound Type: None     Last BM: 07/29/25  Edema: None                    Nutr. Labs:    Lab Results   Component Value Date    CREATININE 0.62 07/29/2025    BUN 4 (L) 07/29/2025     (L) 07/29/2025    K 4.2 07/29/2025     07/29/2025    CO2 28 07/29/2025       Lab Results   Component Value Date/Time    POCGLU 162 09/02/2022 08:31 AM    POCGLU 112 09/01/2022 07:56 PM    POCGLU 107 09/01/2022 05:06 PM    POCGLU 141 09/01/2022 11:19 AM    POCGLU 210 08/31/2022 08:36 PM    POCGLU 146 08/31/2022 04:07 PM        Hemoglobin A1C   Date Value Ref Range Status   08/30/2022 5.9 (H) 4.0 - 5.6 % Final     Comment:     NEW METHOD  PLEASE NOTE NEW REFERENCE RANGE  (NOTE)  HbA1C Interpretive Ranges  <5.7              Normal  5.7 - 6.4         Consider Prediabetes  >6.5              Consider Diabetes         Lab Results   Component Value Date/Time    MG 1.4 07/24/2025 01:44 PM       Lab Results   Component Value Date    CALCIUM 8.9 07/29/2025    PHOS 2.5 (L) 07/24/2025       No results found for: \"TRIG\"    Nutr. Meds:  Scheduled Meds:   meropenem  1,000 mg IntraVENous Q8H    sodium chloride  Heart disease Mother    • Heart disease Father      Family history as noted in HPI above  Social History     Tobacco Use   • Smoking status: Never Smoker   • Smokeless tobacco: Never Used   Substance Use Topics   • Alcohol use: Yes     Comment: social   • Drug use: Never   ,  for locr Insurance 2 children ages 13 and 15  Social history as noted in HPI above  ALLERGIES:  No Known Allergies    Current Outpatient Medications   Medication Sig Dispense Refill   • aspirin 81 MG EC tablet Take by mouth daily.     • atorvastatin (LIPITOR) 80 MG tablet Take 80 mg by mouth daily.     • prasugrel (EFFIENT) 10 MG Tab Take 10 mg by mouth daily.     • metoPROLOL succinate (TOPROL-XL) 25 MG 24 hr tablet TAKE 0.5 (A HALF) TABLET BY MOUTH EVERY DAY 45 tablet 0     No current facility-administered medications for this visit.        Review of Systems:  Review of Systems   Constitutional: Negative.    HENT: Negative.    Eyes: Negative.    Respiratory: Negative.         Recognizes some dyspnea on exertion over the last 6 to 12 months   Cardiovascular: Negative.    Gastrointestinal: Negative.    Endocrine: Negative.    Genitourinary: Negative.    Musculoskeletal: Negative.    Skin: Negative.         Occasional ecchymoses on chest wall   Allergic/Immunologic: Negative.    Neurological: Negative.    Hematological: Negative.         Bruises easily on prasugrel and aspirin but without any significant bleed   Psychiatric/Behavioral: Negative.         Admits to anxiety       Physical Examination:  Vitals:    05/02/19 1131   BP: 118/68   Pulse:      Physical Exam   Constitutional: He is oriented to person, place, and time.   Well developed well nourished obese male   HENT:   Head: Normocephalic.   Nose: Nose normal.   Tongue midline, no JVD or thyromegaly no obvious masses   Eyes: Pupils are equal, round, and reactive to light. EOM are normal. No scleral icterus.   Neck: Neck supple. No JVD present. No tracheal  deviation present. No thyromegaly present.   Neck circumference 17-1/4 inches   Cardiovascular:   Regular rate and rhythm   PMI normal soft S4  no lifts heaves murmurs rubs  Peripheral pulses full and equal without bruits   Pulmonary/Chest:   Chest clear to percussion auscultation with easy air entry   Abdominal:   Abdomen soft essentially obese without organomegaly, masses or tenderness ;no adenopathy; abdominal aorta not palpable   Genitourinary:   Genitourinary Comments: No CVA tenderness   Musculoskeletal: Normal range of motion. He exhibits no edema or tenderness.   Gait normal   Neurological: He is alert and oriented to person, place, and time. He has normal reflexes. No cranial nerve deficit. Coordination normal.   Sensory intact, Romberg negative; strength equal   Skin: Skin is warm and dry. No rash noted. No erythema.   Psychiatric: He has a normal mood and affect. Judgment and thought content normal.       Lab Results:  No visits with results within 1 Week(s) from this visit.   Latest known visit with results is:   Lab Services on 02/23/2018   Component Date Value Ref Range Status   • RAPID STREP GROUP A 02/23/2018 Positive    Final   Labs from 4/12/2017 revealed a glucose of 70 cholesterol 199 with an HDL of 38 measured  triglycerides 155, BUN/creatinine 15/1.1 potassium 5.1    EKG:  Reviewed EKG 9/7/2017 sinus rhythm nonspecific inferior T wave changes      Imaging:   Exercise stress test for cardiac rehab:9/19/17:   Medications as listed:   Baseline electrocardiogram normal:     Exercised from heart rate 67 blood pressure 124/70 for 9 minutes to a heart rate of 164 and a blood pressure of 166/70. Immediately post exercise blood pressure was 150/70 and 1 minute post exercise the heart rate reduced to 115 his vital signs returned towards baseline.     Conclusion:   #1 functional class I   #2 negative for exercise-induced electrocardiographic ischemia, arrhythmia or chest pain   8/9/16:CAC  SCORE  FINDINGS:    Coronary artery system appears right dominant.    Coronary artery calcification is extensively present with 46 areas of calcification. There is two focal calcified plaques in the left main with one in the mid vessel and the other at the distal vessel.  There is diffuse calcified plaque from the origin of the left anterior descending throughout its proximal and mid portions.  There are three focal calcified plaques of the diagonal branch of the left anterior descending.  There are also several focal calcified plaques of the distal left anterior descending.  There are three focal calcified plaques of the proximal to mid circumflex.  There are 30 calcified plaques with some focal and some diffuse of the proximal to mid to distal right coronary.  Quantification of the plaque was performed.    Agatston scores were:  -Left main score is 14.5  -Left anterior descending score is 963.9  -Diagonal score is 43.7  -Circumflex score is 40.0  -Right coronary score is 891.3  Total coronary calcium score is 1953.4    The visualized portions of the aorta appear normal size.   The aortic arch is not in the field of view following standard protocol.  There is no calcified plaque detected in the visualized portion of the ascending aorta.  The descending thoracic aorta has attenuation artifact and no definitive calcified plaque.    There are two focal calcified plaque detected in the aortic valve annulus/cusp with total calcium score 4.5.    There is no calcified plaque detected in the mitral valve.    The pericardium appears normal thickness without calcification.      CORONARY ARTERY ANGIOGRAM:   1.  Left main coronary artery: Left main bifurcates into left anterior descending artery and the left circumflex artery. There was no calcification or angiographically significant stenosis in its entire course.     2.  Left anterior descending artery: Mid LAD  approximately 50% tubular stenotic lesion.  FFR through this  mid LAD lesion was 0.92.     3.  Left circumflex artery:There was no angiographically significant atherosclerotic disease present in its entire course of left circumflex artery as well as its obtuse marginal branches.  The left posterior descending artery had 50% stenotic lesion.    4.  Right coronary artery : 90% mid RCA stenotic lesion .  Distal segment of right coronary artery divides into the posterior descending artery as well as postero-lateral branch.  There is a 90% stenotic lesion in the ostium of the posterior descending artery.     5.  Likely codominant coronary circulation.      CONCLUSION:  1.  99% stenotic lesion of the mid RCA status post successful Xience 3.5 x 23 mm drug-eluting stent placement.   2.  90% ostial right posterior descending artery status post successful Xience 2.75 x 18 mm drug-eluting stent deployment.    RECOMMENDATIONS:    Postprocedure, patient will be observed in the interventional unit.  The patient will be started on aspirin 81, Prasugrel 10 mg, lipitor 80mg , metoprolol 12.5 with holding parameters.  The patient will be referred to cardiac rehabilitation.Will plan for discharge tomorrow.             Assessment:  Patient Active Problem List    Diagnosis Date Noted   • Agatston coronary artery calcium score greater than 400 05/01/2019     Priority: Low   • Benign essential HTN 05/01/2019     Priority: Low   • CAD in native artery 05/01/2019     Priority: Low   • Cardiovascular risk factor 05/01/2019     Priority: Low   • Dyslexia 05/01/2019     Priority: Low   • Ecchymosis 05/01/2019     Priority: Low   • Gout 05/01/2019     Priority: Low   • Hyperlipidemia 05/01/2019     Priority: Low   • IFG (impaired fasting glucose) 05/01/2019     Priority: Low   • Obesity 05/01/2019     Priority: Low   • Skin tag 05/01/2019     Priority: Low         Plan:  I reviewed all records including the prior cardiac catheterization data and other testing:   This is a 49-year-old male with established  multivessel coronary disease status post PCI in 2016 to the RCA with LAD disease who is centrally obese sedentary dyslipidemic with multiple characteristics of metabolic syndrome still on therapy with some recent dyspnea on exertion.  He also has a clear-cut history of snoring and a neck of greater than 17 inches which requires investigation.  His gout has been quiescent for the last several years.  He has also been somewhat remiss in his medical follow-up  Plan obtain basic laboratory studies, schedule him for a nuclear stress test, instructed him on the absolute need to measure his activity and intake and cut down on foods and exercise 30 minutes a day.  He also will need a sleep study and based on his family history I suggested that his children be tested for their cholesterol panel and further changes and discussions after the above.        David Marinelli MD  5/2/2019  11:52 AM

## 2025-07-29 NOTE — PLAN OF CARE
Problem: Discharge Planning  Goal: Discharge to home or other facility with appropriate resources  Outcome: Progressing     Problem: Pain  Goal: Verbalizes/displays adequate comfort level or baseline comfort level  7/28/2025 2231 by PATRICIA Clement, RICHARDSON  Outcome: Progressing     Problem: Skin/Tissue Integrity  Goal: Skin integrity remains intact  7/29/2025 0812 by Laura Khan, RN  Outcome: Progressing  7/28/2025 2231 by PATRICIA Clement, RN  Outcome: Progressing  Flowsheets (Taken 7/28/2025 1558 by Donovan Sparks, RN)  Skin Integrity Remains Intact: Monitor for areas of redness and/or skin breakdown

## 2025-07-29 NOTE — OR NURSING
Pt to IR for CT guided sigmoid abscess drainage.  Procedure tolerated well.   7 ml of bloody fluid drained from mid lower abdomen. Bandaid applied to site.    Pt fully recovered from sedation in IR suite and transported to [facility]. Primary RN notified via perfect serve.

## 2025-07-29 NOTE — H&P
INTERVENTIONAL RADIOLOGY  Preoperative History and Physical      Patient:  Dayan Lyons  :  1965  Age:  60 y.o.  MRN:  [id number]  Today's Date:  2025      CC / HPI   Dayan Lyons is a 60 y.o. female with a history of diverticular abscess with secondary involvement of the anterolateral left urinary bladder who presents for CT guided diverticular abscess aspiration.    PAST MEDICAL HISTORY  Past Medical History:   Diagnosis Date    Anemia, unspecified 2011    Autoimmune disease     fibromyalgia    Coagulation defects     anemia    Crohn's disease (HCC)     Depression     Fibromyalgia     GERD (gastroesophageal reflux disease)     Hypercholesterolemia     Hypertension     Musculoskeletal disorder     Other ill-defined conditions(799.89)     crohns    Psychiatric disorder     depression    Unspecified sleep apnea     cpap       PAST SURGICAL HISTORY  Past Surgical History:   Procedure Laterality Date    CHOLECYSTECTOMY  -    COLONOSCOPY N/A 2020    COLONOSCOPY performed by Bandar De Santiago MD at [facility]    OVARY REMOVAL  2003    right       SOCIAL HISTORY  Social History     Socioeconomic History    Marital status:      Spouse name: Not on file    Number of children: Not on file    Years of education: Not on file    Highest education level: Not on file   Occupational History    Not on file   Tobacco Use    Smoking status: Former     Current packs/day: 0.00     Types: Cigarettes     Quit date: 1998     Years since quittin.9    Smokeless tobacco: Never   Substance and Sexual Activity    Alcohol use: Yes    Drug use: No    Sexual activity: Not on file   Other Topics Concern    Not on file   Social History Narrative    Not on file     Social Drivers of Health     Financial Resource Strain: Not on file   Food Insecurity: No Food Insecurity (2025)    Hunger Vital Sign     Worried About Running Out of Food in the Last Year: Never true     Ran Out of Food in the Last

## 2025-07-29 NOTE — CARE COORDINATION
CM reviewed chart, recs noted.     CM spoke with patient, no preferences for HH or infusion, referrals sent, awaiting response. Patient stated that she is comfortable with administering the medications.     DCP: home with home health/infusion. Will need hard scripts and PICC.

## 2025-07-29 NOTE — PLAN OF CARE
Problem: Discharge Planning  Goal: Discharge to home or other facility with appropriate resources  Recent Flowsheet Documentation  Taken 7/28/2025 1558 by Donovan Sparks RN  Discharge to home or other facility with appropriate resources: Identify barriers to discharge with patient and caregiver  7/28/2025 1500 by Donovan Sparks RN  Outcome: Progressing     Problem: Pain  Goal: Verbalizes/displays adequate comfort level or baseline comfort level  7/28/2025 2231 by PATRICIA Clement, RICHARDSON  Outcome: Progressing  7/28/2025 1500 by Donovan Sparks RN  Outcome: Progressing     Problem: Skin/Tissue Integrity  Goal: Skin integrity remains intact  Description: 1.  Monitor for areas of redness and/or skin breakdown  2.  Assess vascular access sites hourly  3.  Every 4-6 hours minimum:  Change oxygen saturation probe site  4.  Every 4-6 hours:  If on nasal continuous positive airway pressure, respiratory therapy assess nares and determine need for appliance change or resting period  7/28/2025 2231 by PATRICIA Clement, RICHARDSON  Outcome: Progressing  Flowsheets (Taken 7/28/2025 1558 by Donovan Sparks, RN)  Skin Integrity Remains Intact: Monitor for areas of redness and/or skin breakdown  7/28/2025 1500 by Donovan Sparks RN  Outcome: Progressing     Problem: Safety - Adult  Goal: Free from fall injury  7/28/2025 2231 by PATRICIA Clement, RICHARDSON  Outcome: Progressing  7/28/2025 1500 by Donovan Sparks RN  Outcome: Progressing

## 2025-07-29 NOTE — PROGRESS NOTES
PROGRESS NOTE      Chief Complaints:  No new complaints.  HPI and  Objective:    Abdominal pain about the same.  Temperature 98.3.    Review of Systems:  Rest of review of system reviewed personally and they are negative.    EXAM:  BP (!) 115/45   Pulse 67   Temp 98.3 °F (36.8 °C) (Oral)   Resp 18   Ht 1.549 m (5' 0.98\")   Wt 101.2 kg (223 lb 1.7 oz)   SpO2 98%   BMI 42.18 kg/m²     Patient is awake   Head and neck atraumatic, normocephalic.  ENT: No hoarse voice, gaze appropriate.  Cardiac system regular rate rhythm.  Pulmonary no audible wheeze, no cyanosis.  Chest wall excursion normal with respiration cycle  Abdomen is soft not particularly distended.  Neurologically nonfocal.  Hematology system: No bruising noted.  Musculoskeletal system: No joint deformity noted.  Genitourinary system: No hematuria noted.  Skin is warm and moist.  Psychosocial: Cooperative.  Vascular examination as previously noted no changes.    Current Facility-Administered Medications   Medication Dose Route Frequency Provider Last Rate Last Admin    HYDROmorphone HCl PF (DILAUDID) injection 1 mg  1 mg IntraVENous Q4H PRN Justin Nava MD   1 mg at 07/29/25 0630    Petrolatum-Zinc Oxide ointment   Topical PRN Laila Pascual MD        meropenem (MERREM) 1,000 mg in sodium chloride 0.9 % 100 mL IVPB (addEASE)  1,000 mg IntraVENous Q8H Laila Pascual MD   Stopped at 07/29/25 0615    diatrizoate meglumine-sodium (GASTROGRAFIN) 66-10 % solution 30 mL  30 mL Oral ONCE PRN Laila Pascual MD   30 mL at 07/23/25 1012    ondansetron (ZOFRAN) injection 4 mg  4 mg IntraVENous Q6H PRN Abrahan Alves MD        dextrose 5 % and 0.45 % NaCl with KCl 20 mEq infusion   IntraVENous Continuous Laila Pascual MD 50 mL/hr at 07/28/25 2357 New Bag at 07/28/25 2357    sodium chloride flush 0.9 % injection 5-40 mL  5-40 mL IntraVENous 2 times per day Jarrett Cortez MD   10 mL at 07/28/25 2125    sodium chloride flush 0.9 %  injection 5-40 mL  5-40 mL IntraVENous PRJarrett Slade MD        0.9 % sodium chloride infusion   IntraVENous PRN Jarrett Cortez MD        potassium chloride (KLOR-CON M) extended release tablet 40 mEq  40 mEq Oral PRN Jarrett Cortez MD   40 mEq at 07/22/25 0609    Or    potassium bicarb-citric acid (EFFER-K) effervescent tablet 40 mEq  40 mEq Oral PRN Jarrett Cortez MD   40 mEq at 07/21/25 1515    Or    potassium chloride 10 mEq/100 mL IVPB (Peripheral Line)  10 mEq IntraVENous PRN Jarrett Cortez  mL/hr at 07/21/25 0816 10 mEq at 07/21/25 0816    acetaminophen (TYLENOL) tablet 650 mg  650 mg Oral Q6H Jarrett Cortez MD   650 mg at 07/29/25 0619    ondansetron (ZOFRAN-ODT) disintegrating tablet 4 mg  4 mg Oral Q8H PRN Jarrett Cortez MD        Or    ondansetron (ZOFRAN) injection 4 mg  4 mg IntraVENous Q6H PRN Jarrett Cortez MD   4 mg at 07/25/25 0040    enoxaparin (LOVENOX) injection 40 mg  40 mg SubCUTAneous Daily Jarrett Cortez MD   40 mg at 07/28/25 1022    hydroCHLOROthiazide (HYDRODIURIL) tablet 25 mg  25 mg Oral Daily Laila Pascual MD   25 mg at 07/28/25 1021    FLUoxetine (PROZAC) capsule 40 mg  40 mg Oral Daily Laila Pascual MD   40 mg at 07/28/25 1129    rosuvastatin (CRESTOR) tablet 40 mg  40 mg Oral Nightly Laila Pascual MD   40 mg at 07/28/25 2125    pantoprazole (PROTONIX) tablet 40 mg  40 mg Oral QAM AC Laila Pascual MD   40 mg at 07/29/25 0620    metoprolol tartrate (LOPRESSOR) tablet 50 mg  50 mg Oral Daily Laila Pascual MD   50 mg at 07/28/25 1021    Vitamin D (CHOLECALCIFEROL) tablet 2,000 Units  2,000 Units Oral Daily Laila Pascual MD   2,000 Units at 07/28/25 1021    aspirin chewable tablet 81 mg  81 mg Oral Daily Laila Pascual MD   81 mg at 07/28/25 1021           Recent Results (from the past 24 hours)   Comprehensive Metabolic Panel    Collection Time: 07/28/25  8:32 AM   Result Value Ref Range

## 2025-07-30 LAB
ALBUMIN SERPL-MCNC: 2.3 G/DL (ref 3.5–5)
ALBUMIN/GLOB SERPL: 0.6 (ref 1.1–2.2)
ALP SERPL-CCNC: 345 U/L (ref 45–117)
ALT SERPL-CCNC: 62 U/L (ref 12–78)
ANION GAP SERPL CALC-SCNC: 7 MMOL/L (ref 2–12)
AST SERPL W P-5'-P-CCNC: 75 U/L (ref 15–37)
BASOPHILS # BLD: 0.01 K/UL (ref 0–0.1)
BASOPHILS NFR BLD: 0.2 % (ref 0–1)
BILIRUB SERPL-MCNC: 0.3 MG/DL (ref 0.2–1)
BUN SERPL-MCNC: 5 MG/DL (ref 6–20)
BUN/CREAT SERPL: 8 (ref 12–20)
CA-I BLD-MCNC: 8.6 MG/DL (ref 8.5–10.1)
CHLORIDE SERPL-SCNC: 103 MMOL/L (ref 97–108)
CO2 SERPL-SCNC: 27 MMOL/L (ref 21–32)
CREAT SERPL-MCNC: 0.61 MG/DL (ref 0.55–1.02)
CRP SERPL-MCNC: 1.65 MG/DL (ref 0–0.3)
DIFFERENTIAL METHOD BLD: ABNORMAL
EOSINOPHIL # BLD: 0.18 K/UL (ref 0–0.4)
EOSINOPHIL NFR BLD: 3.8 % (ref 0–7)
ERYTHROCYTE [DISTWIDTH] IN BLOOD BY AUTOMATED COUNT: 14.3 % (ref 11.5–14.5)
FUNGITELL INTERPRETATION: NORMAL
FUNGITELL: <31.25 PG/ML
GLOBULIN SER CALC-MCNC: 4 G/DL (ref 2–4)
GLUCOSE SERPL-MCNC: 90 MG/DL (ref 65–100)
HCT VFR BLD AUTO: 29.4 % (ref 35–47)
HGB BLD-MCNC: 9.4 G/DL (ref 11.5–16)
IMM GRANULOCYTES # BLD AUTO: 0.02 K/UL (ref 0–0.04)
IMM GRANULOCYTES NFR BLD AUTO: 0.4 % (ref 0–0.5)
LYMPHOCYTES # BLD: 1.18 K/UL (ref 0.8–3.5)
LYMPHOCYTES NFR BLD: 24.6 % (ref 12–49)
Lab: NORMAL
MCH RBC QN AUTO: 29.5 PG (ref 26–34)
MCHC RBC AUTO-ENTMCNC: 32 G/DL (ref 30–36.5)
MCV RBC AUTO: 92.2 FL (ref 80–99)
MONOCYTES # BLD: 0.34 K/UL (ref 0–1)
MONOCYTES NFR BLD: 7.1 % (ref 5–13)
NEUTS SEG # BLD: 3.07 K/UL (ref 1.8–8)
NEUTS SEG NFR BLD: 63.9 % (ref 32–75)
NRBC # BLD: 0 K/UL (ref 0–0.01)
NRBC BLD-RTO: 0 PER 100 WBC
PLATELET # BLD AUTO: 289 K/UL (ref 150–400)
PMV BLD AUTO: 10.3 FL (ref 8.9–12.9)
POTASSIUM SERPL-SCNC: 4.1 MMOL/L (ref 3.5–5.1)
PROT SERPL-MCNC: 6.3 G/DL (ref 6.4–8.2)
RBC # BLD AUTO: 3.19 M/UL (ref 3.8–5.2)
REFERENCE VALUE: NORMAL
RESULT: NORMAL
SODIUM SERPL-SCNC: 137 MMOL/L (ref 136–145)
WBC # BLD AUTO: 4.8 K/UL (ref 3.6–11)

## 2025-07-30 PROCEDURE — 87449 NOS EACH ORGANISM AG IA: CPT

## 2025-07-30 PROCEDURE — 86140 C-REACTIVE PROTEIN: CPT

## 2025-07-30 PROCEDURE — 94761 N-INVAS EAR/PLS OXIMETRY MLT: CPT

## 2025-07-30 PROCEDURE — 2500000003 HC RX 250 WO HCPCS: Performed by: STUDENT IN AN ORGANIZED HEALTH CARE EDUCATION/TRAINING PROGRAM

## 2025-07-30 PROCEDURE — 6360000002 HC RX W HCPCS: Performed by: SURGERY

## 2025-07-30 PROCEDURE — 6360000002 HC RX W HCPCS: Performed by: STUDENT IN AN ORGANIZED HEALTH CARE EDUCATION/TRAINING PROGRAM

## 2025-07-30 PROCEDURE — 80053 COMPREHEN METABOLIC PANEL: CPT

## 2025-07-30 PROCEDURE — 99231 SBSQ HOSP IP/OBS SF/LOW 25: CPT | Performed by: STUDENT IN AN ORGANIZED HEALTH CARE EDUCATION/TRAINING PROGRAM

## 2025-07-30 PROCEDURE — 6370000000 HC RX 637 (ALT 250 FOR IP): Performed by: SURGERY

## 2025-07-30 PROCEDURE — 36415 COLL VENOUS BLD VENIPUNCTURE: CPT

## 2025-07-30 PROCEDURE — 85025 COMPLETE CBC W/AUTO DIFF WBC: CPT

## 2025-07-30 PROCEDURE — 2580000003 HC RX 258: Performed by: STUDENT IN AN ORGANIZED HEALTH CARE EDUCATION/TRAINING PROGRAM

## 2025-07-30 PROCEDURE — 2500000003 HC RX 250 WO HCPCS: Performed by: SURGERY

## 2025-07-30 PROCEDURE — 1100000000 HC RM PRIVATE

## 2025-07-30 PROCEDURE — 36410 VNPNXR 3YR/> PHY/QHP DX/THER: CPT

## 2025-07-30 PROCEDURE — 6370000000 HC RX 637 (ALT 250 FOR IP): Performed by: STUDENT IN AN ORGANIZED HEALTH CARE EDUCATION/TRAINING PROGRAM

## 2025-07-30 RX ORDER — LIDOCAINE HYDROCHLORIDE 20 MG/ML
100 INJECTION, SOLUTION INFILTRATION; PERINEURAL ONCE
Status: DISCONTINUED | OUTPATIENT
Start: 2025-07-30 | End: 2025-08-01 | Stop reason: HOSPADM

## 2025-07-30 RX ORDER — HYDROMORPHONE HYDROCHLORIDE 1 MG/ML
1 INJECTION, SOLUTION INTRAMUSCULAR; INTRAVENOUS; SUBCUTANEOUS EVERY 4 HOURS PRN
Status: ACTIVE | OUTPATIENT
Start: 2025-07-30 | End: 2025-07-30

## 2025-07-30 RX ORDER — SODIUM CHLORIDE 0.9 % (FLUSH) 0.9 %
5-40 SYRINGE (ML) INJECTION PRN
Status: DISCONTINUED | OUTPATIENT
Start: 2025-07-30 | End: 2025-08-01 | Stop reason: HOSPADM

## 2025-07-30 RX ORDER — SODIUM CHLORIDE 9 MG/ML
INJECTION, SOLUTION INTRAVENOUS PRN
Status: DISCONTINUED | OUTPATIENT
Start: 2025-07-30 | End: 2025-08-01 | Stop reason: HOSPADM

## 2025-07-30 RX ORDER — OXYCODONE HYDROCHLORIDE 5 MG/1
5 TABLET ORAL EVERY 4 HOURS PRN
Refills: 0 | Status: DISCONTINUED | OUTPATIENT
Start: 2025-07-30 | End: 2025-08-01 | Stop reason: HOSPADM

## 2025-07-30 RX ORDER — SODIUM CHLORIDE 0.9 % (FLUSH) 0.9 %
5-40 SYRINGE (ML) INJECTION EVERY 12 HOURS SCHEDULED
Status: DISCONTINUED | OUTPATIENT
Start: 2025-07-30 | End: 2025-08-01 | Stop reason: HOSPADM

## 2025-07-30 RX ADMIN — OXYCODONE 5 MG: 5 TABLET ORAL at 14:41

## 2025-07-30 RX ADMIN — ROSUVASTATIN CALCIUM 40 MG: 20 TABLET, FILM COATED ORAL at 21:22

## 2025-07-30 RX ADMIN — SODIUM CHLORIDE, PRESERVATIVE FREE 10 ML: 5 INJECTION INTRAVENOUS at 21:28

## 2025-07-30 RX ADMIN — SODIUM CHLORIDE, PRESERVATIVE FREE 10 ML: 5 INJECTION INTRAVENOUS at 21:27

## 2025-07-30 RX ADMIN — HYDROCHLOROTHIAZIDE 25 MG: 25 TABLET ORAL at 11:56

## 2025-07-30 RX ADMIN — FLUOXETINE HYDROCHLORIDE 40 MG: 20 CAPSULE ORAL at 11:56

## 2025-07-30 RX ADMIN — MEROPENEM 1000 MG: 1 INJECTION INTRAVENOUS at 14:39

## 2025-07-30 RX ADMIN — ACETAMINOPHEN 650 MG: 325 TABLET ORAL at 11:56

## 2025-07-30 RX ADMIN — DEXTROSE MONOHYDRATE, SODIUM CHLORIDE, AND POTASSIUM CHLORIDE: 50; 1.49; 4.5 INJECTION, SOLUTION INTRAVENOUS at 03:43

## 2025-07-30 RX ADMIN — PANTOPRAZOLE SODIUM 40 MG: 40 TABLET, DELAYED RELEASE ORAL at 06:33

## 2025-07-30 RX ADMIN — ENOXAPARIN SODIUM 40 MG: 100 INJECTION SUBCUTANEOUS at 11:57

## 2025-07-30 RX ADMIN — ACETAMINOPHEN 650 MG: 325 TABLET ORAL at 06:33

## 2025-07-30 RX ADMIN — ACETAMINOPHEN 650 MG: 325 TABLET ORAL at 18:25

## 2025-07-30 RX ADMIN — SODIUM CHLORIDE, PRESERVATIVE FREE 10 ML: 5 INJECTION INTRAVENOUS at 11:59

## 2025-07-30 RX ADMIN — Medication 2000 UNITS: at 11:57

## 2025-07-30 RX ADMIN — MEROPENEM 1000 MG: 1 INJECTION INTRAVENOUS at 21:27

## 2025-07-30 RX ADMIN — OXYCODONE 5 MG: 5 TABLET ORAL at 22:02

## 2025-07-30 RX ADMIN — ASPIRIN 81 MG: 81 TABLET, CHEWABLE ORAL at 11:56

## 2025-07-30 RX ADMIN — MEROPENEM 1000 MG: 1 INJECTION INTRAVENOUS at 06:37

## 2025-07-30 ASSESSMENT — PAIN DESCRIPTION - LOCATION
LOCATION: ABDOMEN

## 2025-07-30 ASSESSMENT — PAIN - FUNCTIONAL ASSESSMENT: PAIN_FUNCTIONAL_ASSESSMENT: ACTIVITIES ARE NOT PREVENTED

## 2025-07-30 ASSESSMENT — PAIN DESCRIPTION - DESCRIPTORS
DESCRIPTORS: ACHING
DESCRIPTORS: ACHING
DESCRIPTORS: CRAMPING;ACHING
DESCRIPTORS: ACHING
DESCRIPTORS: ACHING

## 2025-07-30 ASSESSMENT — PAIN DESCRIPTION - ORIENTATION
ORIENTATION: ANTERIOR
ORIENTATION: LOWER
ORIENTATION: ANTERIOR
ORIENTATION: ANTERIOR
ORIENTATION: LOWER

## 2025-07-30 ASSESSMENT — PAIN SCALES - GENERAL
PAINLEVEL_OUTOF10: 3
PAINLEVEL_OUTOF10: 3
PAINLEVEL_OUTOF10: 7

## 2025-07-30 ASSESSMENT — PAIN SCALES - WONG BAKER: WONGBAKER_NUMERICALRESPONSE: HURTS A LITTLE BIT

## 2025-07-30 NOTE — PROGRESS NOTES
Physician Progress Note      PATIENT:               TYLER MALIK  CSN #:                  [id number]  :                       1965  ADMIT DATE:       2025 8:38 PM  DISCH DATE:  RESPONDING  PROVIDER #:        Laila Pascual MD          QUERY TEXT:    Sepsis is documented in the medical record Consults by Guillermo Mack MD   2025. Please provide additional clinical indicators supportive of your   documentation. Or please document if the diagnosis of sepsis has been ruled   out after study.    The clinical indicators include:  > Consults by Guillermo Mack MD 2025- \"Sepsis with fever,   leukocytosis and elevated CRP\"  BP (!) 143/78  Pulse 75  Temp 97.9 ?F (36.6 ?C) (Oral)  Resp 14   > 25 07:09 Procalcitonin: 0.06 (H)  25 06:02Procalcitonin: 0.10 (H)  > 2025 VITALS: BP (!) 95/43  Pulse 65  Temp 97.3 ?F (36.3 ?C) (Oral)    Resp 16  Ht 1.549 m (5' 1\")  Options provided:  -- Sepsis present as evidenced by, Please document evidence.  -- Sepsis was ruled out after study  -- Other - I will add my own diagnosis  -- Disagree - Not applicable / Not valid  -- Disagree - Clinically unable to determine / Unknown  -- Refer to Clinical Documentation Reviewer    PROVIDER RESPONSE TEXT:    Sepsis is present as evidenced by hypotension, leukocytosis, elevated CRP    Query created by: Kelly Sanchez on 2025 1:14 PM      Electronically signed by:  Laila Pascual MD 2025 1:10 PM

## 2025-07-30 NOTE — WOUND CARE
Wound Care Note:    Wound Care into see patient because of pressure injury to buttocks    Skin Care & Pressure Relief Recommendations:  Minimize layers of linen  Pads under patient to optimize support surface and microclimate  Turn/reposition approximately every 2 hours.  Pillow Wedges  Manage incontinence  Promote continence; Skin Protective lotion to buttocks and sacrum daily and as needed with incontinence care    Offload heels with pillows or offloading boots.    Support Surface: Foam    Recommendations: no pressure injury noted to buttocks.  Possible incontinence associated dermatitis noted to inner buttocks r/t frequent diarrhea.  Patient reports diarrhea has improved and she is able to use the BSC when needed.  Patient also reports that skin feels less irritated to inner buttocks. Skin to inner buttocks is erythematous and slightly denuded.  Obtained picture with patient's verbal consent.    -For inner buttocks: Apply Remedy Z-Guard Protectant Paste BID and prn for soiling.  If soiled, remove top soiled layer only and reapply.  Use Remedy Phytoplex Barrier Cream wipes for gentle cleansing.  To completely remove, use Remedy Foaming Cleanser or soap and water.    Prevention:  Apply Optifoam Border Sacral foam dressing to sacrum and Border Heel foams to both heels every three days to redistribute pressure from bony prominence and prevent pressure and friction injury to skin.  Rn is to gently peel back foam dressing for shift integumentary assessment and re-secure.  No incontinence issues noted at this time.  Remind patient to turn q2h at approximate 30 degree angle to offload sacrum and buttocks while in the bed, if needed.  Float heels with 1-2 pillows lengthwise while in bed for offloading of the heels.  Maintain HOB at 30 degrees or less, if not contraindicated, to reduce pressure to buttocks and sacrum.  Raise foot of bed to help prevent friction and shear injury from sliding down in the bed.  Re-consult [facility]

## 2025-07-30 NOTE — CARE COORDINATION
CM reviewed chart. Patient continues on IV Meropenum, will need for 3 more weeks per ID.     Awaiting antibiotic prescription and PICC/midline placement.    DCP: Home health/infusion once clinically stable.     :: [facility]  Infusion: [facility]s, teaching already completed.

## 2025-07-30 NOTE — PROGRESS NOTES
Department of General Surgery - Adult  Surgical Service    Progress Note      SUBJECTIVE:  Afebrile. Abdominal pain improved after IR drainage. Tolerating solid food. BM becoming more formed. 5 BM x 24h. Some nausea this AM. Inflammatory markers improving.    OBJECTIVE      Physical    VITALS:  BP (!) 124/55   Pulse 59   Temp 97.9 °F (36.6 °C) (Oral)   Resp 18   Ht 1.549 m (5' 0.98\")   Wt 101.2 kg (223 lb 1.7 oz)   SpO2 96%   BMI 42.18 kg/m²   INTAKE/OUTPUT:    Intake/Output Summary (Last 24 hours) at 7/30/2025 0831  Last data filed at 7/30/2025 0638  Gross per 24 hour   Intake 496 ml   Output 700 ml   Net -204 ml     NAD  NCAT  No scleral icterus  Trachea midline  Normal WOB on RA  Abd soft, tender to deep palpation in LUQ, LLQ, suprapubic area. No rebound or guarding.  Ext WWP  Grossly neuro intact  No jaundice  Affect appropriate     Data  Reviewed.     Current Inpatient Medications    Reviewed.     ASSESSMENT AND PLAN    This is a 60-year-old female with history of Crohn's colitis who presents with a flare vs sigmoid diverticulitis and has a small pelvic abscess. IR drainage 7/29. Cultures pending.      Meropenem. IV antibiotics x 3 weeks per ID. Cultures pending. Inflammatory markers improving.  GI consult  Diet as tolerated. MLIV  Serial abdominal exams  DVT prophylaxis  If ongoing symptoms without improvement on antibiotic therapy and with IR drainage, may require OR.     Laila Pascual MD   July 30, 2025

## 2025-07-30 NOTE — PLAN OF CARE
Problem: Discharge Planning  Goal: Discharge to home or other facility with appropriate resources  Recent Flowsheet Documentation  Taken 7/30/2025 1206 by Lindy Russo LPN  Discharge to home or other facility with appropriate resources: Identify barriers to discharge with patient and caregiver  7/30/2025 0405 by Winter Ram RN  Outcome: Progressing     Problem: Pain  Goal: Verbalizes/displays adequate comfort level or baseline comfort level  7/30/2025 1417 by Lindy Russo LPN  Outcome: Progressing  7/30/2025 0405 by Winter Ram RN  Outcome: Progressing     Problem: Safety - Adult  Goal: Free from fall injury  7/30/2025 1417 by Lindy Russo LPN  Outcome: Progressing  7/30/2025 0405 by Winter Ram RN  Outcome: Progressing

## 2025-07-30 NOTE — PROGRESS NOTES
Comprehensive Nutrition Assessment    Type and Reason for Visit:  Initial, Positive nutrition screen    Nutrition Recommendations/Plan:   Continue Current Diet  Encourage PO intakes >50%  Monitor/document wt, BM, PO% in I/O     Malnutrition Assessment:  Malnutrition Status:  No malnutrition (07/25/25 1018)    Context:  Acute Illness       Nutrition Assessment:    (7/30) Update: RD to visit patient, patient reports improved po 75% of dinner last evening, 75% breakfast this am. No nausea, diarrhea improving. Reviewed low fat diet restrictions. Patient verbalizes understanding.     Screened for Clear Liquid diet x 4 days, no documented intake. Will add a clear ONS to diet order. Current weight down significantly in past 5 months at 12%, continues above accpetable BMI. Reports poor po PTA, abd pain. Labs reviewed, Mag and Phos depleted, no noted repletion, receives IVH at 100 ml/hr. (7/29) Update: Diet advanced to Regular, intake fair at 25-75%,currently NPO for a procedure. Weight up since last review, no edema noted. Labs reviewed, Phos depleted. Meds reviewed.    Nutrition Related Findings:      Wound Type: None       Last BM: 07/29/25  Edema: None                    Nutr. Labs:    Lab Results   Component Value Date    CREATININE 0.61 07/30/2025    BUN 5 (L) 07/30/2025     07/30/2025    K 4.1 07/30/2025     07/30/2025    CO2 27 07/30/2025       Lab Results   Component Value Date/Time    POCGLU 162 09/02/2022 08:31 AM    POCGLU 112 09/01/2022 07:56 PM    POCGLU 107 09/01/2022 05:06 PM    POCGLU 141 09/01/2022 11:19 AM    POCGLU 210 08/31/2022 08:36 PM    POCGLU 146 08/31/2022 04:07 PM        Hemoglobin A1C   Date Value Ref Range Status   08/30/2022 5.9 (H) 4.0 - 5.6 % Final     Comment:     NEW METHOD  PLEASE NOTE NEW REFERENCE RANGE  (NOTE)  HbA1C Interpretive Ranges  <5.7              Normal  5.7 - 6.4         Consider Prediabetes  >6.5              Consider Diabetes         Lab Results   Component Value

## 2025-07-30 NOTE — PROGRESS NOTES
Progress Note  Date:2025       Room:[facility]  Patient Name:Dayan Lyons     YOB: 1965     Age:60 y.o.        Subjective    Subjective Patient followed for intra-abdominopelvic abscess secondary to sigmoid diverticulitis.   Yesterday she underwent CT guided abscess of pelvic abscess with culture pending. Remains afebrile with decreasing CRP.     Objective         Vitals Last 24 Hours:  TEMPERATURE:  Temp  Av.9 °F (36.6 °C)  Min: 97.9 °F (36.6 °C)  Max: 97.9 °F (36.6 °C)  RESPIRATIONS RANGE: Resp  Av.2  Min: 16  Max: 18  PULSE OXIMETRY RANGE: SpO2  Av.7 %  Min: 93 %  Max: 98 %  PULSE RANGE: Pulse  Av.3  Min: 59  Max: 69  BLOOD PRESSURE RANGE: Systolic (24hrs), Av , Min:105 , Max:131   ; Diastolic (24hrs), Av, Min:52, Max:55         Objective:  Vital signs: (most recent): Blood pressure (!) 131/52, pulse 69, temperature 97.9 °F (36.6 °C), temperature source Oral, resp. rate 16, height 1.549 m (5' 0.98\"), weight 101.2 kg (223 lb 1.7 oz), SpO2 98%.      Vitals and nursing note reviewed.   Constitutional:       Appearance: She is obese. She is ill-appearing.   HENT:      Head: Normocephalic and atraumatic.      Right Ear: External ear normal.      Left Ear: External ear normal.      Nose: Nose normal.      Mouth/Throat:      Pharynx: Oropharynx is clear.   Eyes:      Extraocular Movements: Extraocular movements intact.      Pupils: Pupils are equal, round, and reactive to light.   Cardiovascular:      Rate and Rhythm: Normal rate and regular rhythm.      Pulses: Normal pulses.      Heart sounds: Normal heart sounds.   Abdominal:      General: Bowel sounds are normal. There is no distension.      Palpations: Abdomen is soft.      Tenderness: There is abdominal tenderness. There is no right CVA tenderness, left CVA tenderness or guarding.   Genitourinary:     Comments: No Gonzalez catheter  Musculoskeletal:      Cervical back: Neck supple.      Right lower leg: No edema.

## 2025-07-31 LAB
BACTERIA SPEC CULT: NORMAL
CRP SERPL-MCNC: 1.22 MG/DL (ref 0–0.3)
Lab: NORMAL

## 2025-07-31 PROCEDURE — 6370000000 HC RX 637 (ALT 250 FOR IP): Performed by: SURGERY

## 2025-07-31 PROCEDURE — 6370000000 HC RX 637 (ALT 250 FOR IP): Performed by: STUDENT IN AN ORGANIZED HEALTH CARE EDUCATION/TRAINING PROGRAM

## 2025-07-31 PROCEDURE — 1100000000 HC RM PRIVATE

## 2025-07-31 PROCEDURE — 6360000002 HC RX W HCPCS: Performed by: STUDENT IN AN ORGANIZED HEALTH CARE EDUCATION/TRAINING PROGRAM

## 2025-07-31 PROCEDURE — 6360000002 HC RX W HCPCS: Performed by: SURGERY

## 2025-07-31 PROCEDURE — 86140 C-REACTIVE PROTEIN: CPT

## 2025-07-31 PROCEDURE — 2500000003 HC RX 250 WO HCPCS: Performed by: SURGERY

## 2025-07-31 PROCEDURE — 2580000003 HC RX 258: Performed by: STUDENT IN AN ORGANIZED HEALTH CARE EDUCATION/TRAINING PROGRAM

## 2025-07-31 PROCEDURE — 99232 SBSQ HOSP IP/OBS MODERATE 35: CPT | Performed by: INTERNAL MEDICINE

## 2025-07-31 PROCEDURE — 2500000003 HC RX 250 WO HCPCS: Performed by: STUDENT IN AN ORGANIZED HEALTH CARE EDUCATION/TRAINING PROGRAM

## 2025-07-31 PROCEDURE — 36415 COLL VENOUS BLD VENIPUNCTURE: CPT

## 2025-07-31 RX ADMIN — ACETAMINOPHEN 650 MG: 325 TABLET ORAL at 00:32

## 2025-07-31 RX ADMIN — OXYCODONE 5 MG: 5 TABLET ORAL at 20:13

## 2025-07-31 RX ADMIN — FLUOXETINE HYDROCHLORIDE 40 MG: 20 CAPSULE ORAL at 10:18

## 2025-07-31 RX ADMIN — HYDROCHLOROTHIAZIDE 25 MG: 25 TABLET ORAL at 10:18

## 2025-07-31 RX ADMIN — SODIUM CHLORIDE, PRESERVATIVE FREE 10 ML: 5 INJECTION INTRAVENOUS at 10:19

## 2025-07-31 RX ADMIN — SODIUM CHLORIDE, PRESERVATIVE FREE 10 ML: 5 INJECTION INTRAVENOUS at 20:14

## 2025-07-31 RX ADMIN — ACETAMINOPHEN 650 MG: 325 TABLET ORAL at 22:57

## 2025-07-31 RX ADMIN — ACETAMINOPHEN 650 MG: 325 TABLET ORAL at 06:11

## 2025-07-31 RX ADMIN — PANTOPRAZOLE SODIUM 40 MG: 40 TABLET, DELAYED RELEASE ORAL at 06:11

## 2025-07-31 RX ADMIN — ENOXAPARIN SODIUM 40 MG: 100 INJECTION SUBCUTANEOUS at 10:18

## 2025-07-31 RX ADMIN — ASPIRIN 81 MG: 81 TABLET, CHEWABLE ORAL at 10:18

## 2025-07-31 RX ADMIN — MEROPENEM 1000 MG: 1 INJECTION INTRAVENOUS at 22:53

## 2025-07-31 RX ADMIN — MEROPENEM 1000 MG: 1 INJECTION INTRAVENOUS at 06:10

## 2025-07-31 RX ADMIN — METOPROLOL TARTRATE 50 MG: 50 TABLET, FILM COATED ORAL at 10:17

## 2025-07-31 RX ADMIN — ACETAMINOPHEN 650 MG: 325 TABLET ORAL at 13:28

## 2025-07-31 RX ADMIN — MEROPENEM 1000 MG: 1 INJECTION INTRAVENOUS at 13:30

## 2025-07-31 RX ADMIN — ACETAMINOPHEN 650 MG: 325 TABLET ORAL at 18:40

## 2025-07-31 RX ADMIN — ROSUVASTATIN CALCIUM 40 MG: 20 TABLET, FILM COATED ORAL at 20:13

## 2025-07-31 RX ADMIN — OXYCODONE 5 MG: 5 TABLET ORAL at 10:34

## 2025-07-31 RX ADMIN — Medication 2000 UNITS: at 10:18

## 2025-07-31 RX ADMIN — SODIUM CHLORIDE, PRESERVATIVE FREE 10 ML: 5 INJECTION INTRAVENOUS at 10:18

## 2025-07-31 ASSESSMENT — PAIN DESCRIPTION - DESCRIPTORS
DESCRIPTORS: ACHING;CRAMPING

## 2025-07-31 ASSESSMENT — PAIN DESCRIPTION - ORIENTATION
ORIENTATION: LOWER
ORIENTATION: LOWER
ORIENTATION: LEFT;LOWER
ORIENTATION: LEFT;LOWER
ORIENTATION: LOWER

## 2025-07-31 ASSESSMENT — PAIN SCALES - GENERAL
PAINLEVEL_OUTOF10: 6
PAINLEVEL_OUTOF10: 2
PAINLEVEL_OUTOF10: 7
PAINLEVEL_OUTOF10: 4
PAINLEVEL_OUTOF10: 7
PAINLEVEL_OUTOF10: 4
PAINLEVEL_OUTOF10: 0
PAINLEVEL_OUTOF10: 4

## 2025-07-31 ASSESSMENT — PAIN - FUNCTIONAL ASSESSMENT
PAIN_FUNCTIONAL_ASSESSMENT: PREVENTS OR INTERFERES SOME ACTIVE ACTIVITIES AND ADLS
PAIN_FUNCTIONAL_ASSESSMENT: ACTIVITIES ARE NOT PREVENTED
PAIN_FUNCTIONAL_ASSESSMENT: ACTIVITIES ARE NOT PREVENTED
PAIN_FUNCTIONAL_ASSESSMENT: PREVENTS OR INTERFERES SOME ACTIVE ACTIVITIES AND ADLS

## 2025-07-31 ASSESSMENT — PAIN DESCRIPTION - LOCATION
LOCATION: ABDOMEN

## 2025-07-31 NOTE — PLAN OF CARE
Problem: Discharge Planning  Goal: Discharge to home or other facility with appropriate resources  Recent Flowsheet Documentation  Taken 7/30/2025 1206 by Lindy Russo LPN  Discharge to home or other facility with appropriate resources: Identify barriers to discharge with patient and caregiver     Problem: Pain  Goal: Verbalizes/displays adequate comfort level or baseline comfort level  7/30/2025 2347 by Kavin Sifuentes, RICHARDSON  Outcome: Progressing  Flowsheets (Taken 7/30/2025 2347)  Verbalizes/displays adequate comfort level or baseline comfort level:   Encourage patient to monitor pain and request assistance   Administer analgesics based on type and severity of pain and evaluate response   Assess pain using appropriate pain scale  7/30/2025 1417 by Lindy Russo LPN  Outcome: Progressing     Problem: Skin/Tissue Integrity  Goal: Skin integrity remains intact  Description: 1.  Monitor for areas of redness and/or skin breakdown  2.  Assess vascular access sites hourly  3.  Every 4-6 hours minimum:  Change oxygen saturation probe site  4.  Every 4-6 hours:  If on nasal continuous positive airway pressure, respiratory therapy assess nares and determine need for appliance change or resting period  Flowsheets  Taken 7/30/2025 2347 by Kavin Sifuentes, RN  Skin Integrity Remains Intact:   Monitor for areas of redness and/or skin breakdown   Every 4-6 hours:  If on nasal continuous positive airway pressure, assess nares and determine need for appliance change or resting period   Turn and reposition as indicated  Taken 7/30/2025 1206 by Lindy Russo LPN  Skin Integrity Remains Intact: Monitor for areas of redness and/or skin breakdown

## 2025-07-31 NOTE — DISCHARGE INSTRUCTIONS
You were admitted for colitis and abscess drainage. You are doing well and ready for discharge home.     Medications: take your IV antibiotic for the full course even if you start feeling better. You will follow up with Dr. Mack in a few weeks.  Oxycodone is for the pain. You can also take tylenol up to 1000mg three or four times a day as needed.      Follow up with Surgery (Dr. Pascual), GI (Dr. Lauren). Call the clinic with any issues. [phone].    Call with fevers, increasing pain, concern for ongoing infection, no BM for 3 days.

## 2025-07-31 NOTE — PROGRESS NOTES
Comprehensive Nutrition Assessment    Type and Reason for Visit:  Reassess    Nutrition Recommendations/Plan:   Continue Current Diet  Encourage PO intakes >50%  Monitor/document wt, BM, PO% in I/O      Malnutrition Assessment:  Malnutrition Status:  No malnutrition (07/25/25 1018)    Context:  Acute Illness         Nutrition Assessment:    (7/31) Update: Continues on a low fiber diet, good po intake at %. No new weight, meds and labs reviewed.   Screened for Clear Liquid diet x 4 days, no documented intake. Will add a clear ONS to diet order. Current weight down significantly in past 5 months at 12%, continues above accpetable BMI. Reports poor po PTA, abd pain. Labs reviewed, Mag and Phos depleted, no noted repletion, receives IVH at 100 ml/hr. (7/29) Update: Diet advanced to Regular, intake fair at 25-75%,currently NPO for a procedure. Weight up since last review, no edema noted. Labs reviewed, Phos depleted. Meds reviewed. (7/30) Update: RD to visit patient, patient reports improved po 75% of dinner last evening, 75% breakfast this am. No nausea, diarrhea improving. Reviewed low fat diet restrictions. Patient verbalizes understanding.    Nutrition Related Findings:      Wound Type: None       Last BM: 07/31/25  Edema: None                    Nutr. Labs:    Lab Results   Component Value Date    CREATININE 0.61 07/30/2025    BUN 5 (L) 07/30/2025     07/30/2025    K 4.1 07/30/2025     07/30/2025    CO2 27 07/30/2025       Lab Results   Component Value Date/Time    POCGLU 162 09/02/2022 08:31 AM    POCGLU 112 09/01/2022 07:56 PM    POCGLU 107 09/01/2022 05:06 PM    POCGLU 141 09/01/2022 11:19 AM    POCGLU 210 08/31/2022 08:36 PM    POCGLU 146 08/31/2022 04:07 PM        Hemoglobin A1C   Date Value Ref Range Status   08/30/2022 5.9 (H) 4.0 - 5.6 % Final     Comment:     NEW METHOD  PLEASE NOTE NEW REFERENCE RANGE  (NOTE)  HbA1C Interpretive Ranges  <5.7              Normal  5.7 - 6.4         Consider  Prediabetes  >6.5              Consider Diabetes         Lab Results   Component Value Date/Time    MG 1.4 07/24/2025 01:44 PM       Lab Results   Component Value Date    CALCIUM 8.6 07/30/2025    PHOS 2.5 (L) 07/24/2025       No results found for: \"TRIG\"    Nutr. Meds:  Scheduled Meds:   sodium chloride flush  5-40 mL IntraVENous 2 times per day    lidocaine  100 mg IntraDERmal Once    meropenem  1,000 mg IntraVENous Q8H    sodium chloride flush  5-40 mL IntraVENous 2 times per day    acetaminophen  650 mg Oral Q6H    enoxaparin  40 mg SubCUTAneous Daily    hydroCHLOROthiazide  25 mg Oral Daily    FLUoxetine  40 mg Oral Daily    rosuvastatin  40 mg Oral Nightly    pantoprazole  40 mg Oral QAM AC    metoprolol tartrate  50 mg Oral Daily    Vitamin D  2,000 Units Oral Daily    aspirin  81 mg Oral Daily     Continuous Infusions:   sodium chloride      sodium chloride       PRN Meds: oxyCODONE, sodium chloride flush, sodium chloride, Petrolatum-Zinc Oxide, diatrizoate meglumine-sodium, ondansetron, sodium chloride flush, sodium chloride, potassium chloride **OR** potassium alternative oral replacement **OR** potassium chloride, ondansetron **OR** ondansetron         Current Nutrition Intake & Therapies:    Average Meal Intake: %  Average Supplements Intake: None Ordered  ADULT DIET; Regular; Low Fiber    Anthropometric Measures:  Height: 154.9 cm (5' 0.98\")  Ideal Body Weight (IBW): 105 lbs (48 kg)    Admission Body Weight: 99.8 kg (220 lb 0.3 oz)  Current Body Weight: 101.2 kg (223 lb 1.7 oz), 209.5 % IBW. Weight Source: Bed scale  Current BMI (kg/m2): 42.2  Usual Body Weight: 113.4 kg (250 lb)     % Weight Change (Calculated): -12                    BMI Categories: Obese Class 3 (BMI 40.0 or greater)    Estimated Daily Nutrient Needs:  Energy Requirements Based On: Formula     Energy (kcal/day): 6257-4048 kcals (MSJ)  Weight Used for Protein Requirements: Current  Protein (g/day): 80-100gr (0.8-1.0

## 2025-07-31 NOTE — PLAN OF CARE
Problem: Discharge Planning  Goal: Discharge to home or other facility with appropriate resources  Outcome: Progressing     Problem: Pain  Goal: Verbalizes/displays adequate comfort level or baseline comfort level  7/31/2025 1025 by Marleny Jay RN  Outcome: Progressing  Flowsheets (Taken 7/31/2025 0740)  Verbalizes/displays adequate comfort level or baseline comfort level:   Encourage patient to monitor pain and request assistance   Assess pain using appropriate pain scale  7/30/2025 2347 by Kavin Sifuentes RN  Outcome: Progressing  Flowsheets (Taken 7/30/2025 2347)  Verbalizes/displays adequate comfort level or baseline comfort level:   Encourage patient to monitor pain and request assistance   Administer analgesics based on type and severity of pain and evaluate response   Assess pain using appropriate pain scale     Problem: Skin/Tissue Integrity  Goal: Skin integrity remains intact  Description: 1.  Monitor for areas of redness and/or skin breakdown  2.  Assess vascular access sites hourly  3.  Every 4-6 hours minimum:  Change oxygen saturation probe site  4.  Every 4-6 hours:  If on nasal continuous positive airway pressure, respiratory therapy assess nares and determine need for appliance change or resting period  7/31/2025 1025 by Marleny Jay RN  Outcome: Progressing  7/30/2025 2347 by Kavin Sifuentes RN  Flowsheets  Taken 7/30/2025 2347 by Kavin Sifuentes RN  Skin Integrity Remains Intact:   Monitor for areas of redness and/or skin breakdown   Every 4-6 hours:  If on nasal continuous positive airway pressure, assess nares and determine need for appliance change or resting period   Turn and reposition as indicated  Taken 7/30/2025 1206 by Lindy Russo LPN  Skin Integrity Remains Intact: Monitor for areas of redness and/or skin breakdown     Problem: Safety - Adult  Goal: Free from fall injury  Outcome: Progressing

## 2025-07-31 NOTE — PROGRESS NOTES
Department of General Surgery - Adult  Surgical Service    Progress Note      SUBJECTIVE:  Afebrile. Stable left sided abdominal pain, tolerating diet. Diarrhea x 5/24h. Interested in working on d/c home tomorrow.    OBJECTIVE      Physical    VITALS:  /60   Pulse 67   Temp 98.2 °F (36.8 °C) (Oral)   Resp 18   Ht 1.549 m (5' 0.98\")   Wt 101.2 kg (223 lb 1.7 oz)   SpO2 95%   BMI 42.18 kg/m²   INTAKE/OUTPUT:    Intake/Output Summary (Last 24 hours) at 7/31/2025 0857  Last data filed at 7/31/2025 0611  Gross per 24 hour   Intake 546.6 ml   Output 1500 ml   Net -953.4 ml     NAD  NCAT  No scleral icterus  Trachea midline  Normal WOB on RA  Abd soft, tender to deep palpation in LUQ, LLQ, suprapubic area. No rebound or guarding.  Ext WWP  Grossly neuro intact  No jaundice  Affect appropriate     Data  Reviewed.     Current Inpatient Medications    Reviewed.     ASSESSMENT AND PLAN    This is a 60-year-old female with history of Crohn's colitis who presents with a flare vs sigmoid diverticulitis and has a small pelvic abscess. IR drainage 7/29. Cultures pending.      Meropenem. IV antibiotics x 3 weeks per ID. Cultures pending. Inflammatory markers improving.  GI consulted  Diet as tolerated. MLIV  Serial abdominal exams  DVT prophylaxis  Discharge planning. Anticipate d/c home tomorrow on IV antibiotics.     Laila Pascual MD   July 31, 2025

## 2025-07-31 NOTE — CARE COORDINATION
CM reviewed chart. Patient continues on IV ABX, will need script for home antibiotics.     Surgery, ID, wound care following. Awaiting culture results per ID.    DCP: Home with home health/infusion once clinically stable.

## 2025-07-31 NOTE — PROGRESS NOTES
Progress Note  Date:2025       Room:[facility]  Patient Name:Dayan Lyons     YOB: 1965     Age:60 y.o.        Subjective    Subjective Patient followed for intra-abdominopelvic abscess secondary to sigmoid diverticulitis, status post CT guided abscess of pelvic abscess with culture growing possible Enterococcus species.   Remains afebrile with decreasing CRP.  Patient resting comfortably with no complaints. She has a midline in place with plans for discharge home as early as tomorrow.      Objective         Vitals Last 24 Hours:  TEMPERATURE:  Temp  Av.1 °F (36.7 °C)  Min: 97.7 °F (36.5 °C)  Max: 98.4 °F (36.9 °C)  RESPIRATIONS RANGE: Resp  Av  Min: 16  Max: 18  PULSE OXIMETRY RANGE: SpO2  Av.8 %  Min: 95 %  Max: 98 %  PULSE RANGE: Pulse  Av.6  Min: 65  Max: 69  BLOOD PRESSURE RANGE: Systolic (24hrs), Av , Min:111 , Max:146   ; Diastolic (24hrs), Av, Min:33, Max:69         Objective:  Vital signs: (most recent): Blood pressure (!) 111/56, pulse 65, temperature 97.7 °F (36.5 °C), temperature source Oral, resp. rate 18, height 1.549 m (5' 0.98\"), weight 101.2 kg (223 lb 1.7 oz), SpO2 97%.      Vitals and nursing note reviewed.   Constitutional:       Appearance: She is obese. She is ill-appearing.   HENT:      Head: Normocephalic and atraumatic.      Right Ear: External ear normal.      Left Ear: External ear normal.      Nose: Nose normal.      Mouth/Throat:      Pharynx: Oropharynx is clear.   Eyes:      Extraocular Movements: Extraocular movements intact.      Pupils: Pupils are equal, round, and reactive to light.   Cardiovascular:      Rate and Rhythm: Normal rate and regular rhythm.      Pulses: Normal pulses.      Heart sounds: Normal heart sounds.   Abdominal:      General: Bowel sounds are normal. There is no distension.      Palpations: Abdomen is soft.      Tenderness: There is abdominal tenderness. There is no right CVA tenderness, left CVA tenderness or

## 2025-07-31 NOTE — PLAN OF CARE
Problem: Nutrition Deficit:  Goal: Optimize nutritional status  Outcome: Progressing  Flowsheets (Taken 7/31/2025 105)  Nutrient intake appropriate for improving, restoring, or maintaining nutritional needs:   Assess nutritional status and recommend course of action   Monitor oral intake, labs, and treatment plans

## 2025-08-01 VITALS
HEIGHT: 61 IN | HEART RATE: 65 BPM | DIASTOLIC BLOOD PRESSURE: 55 MMHG | TEMPERATURE: 97.9 F | OXYGEN SATURATION: 97 % | RESPIRATION RATE: 18 BRPM | BODY MASS INDEX: 42.12 KG/M2 | SYSTOLIC BLOOD PRESSURE: 131 MMHG | WEIGHT: 223.11 LBS

## 2025-08-01 LAB
BACTERIA SPEC CULT: NORMAL
BACTERIA SPEC CULT: NORMAL
FUNGITELL INTERPRETATION: NORMAL
FUNGITELL: <31.25 PG/ML
GRAM STN SPEC: NORMAL
GRAM STN SPEC: NORMAL
Lab: NORMAL
REFERENCE VALUE: NORMAL
RESULT: NORMAL

## 2025-08-01 PROCEDURE — 6360000002 HC RX W HCPCS: Performed by: SURGERY

## 2025-08-01 PROCEDURE — 6370000000 HC RX 637 (ALT 250 FOR IP): Performed by: STUDENT IN AN ORGANIZED HEALTH CARE EDUCATION/TRAINING PROGRAM

## 2025-08-01 PROCEDURE — 6360000002 HC RX W HCPCS: Performed by: STUDENT IN AN ORGANIZED HEALTH CARE EDUCATION/TRAINING PROGRAM

## 2025-08-01 PROCEDURE — 2580000003 HC RX 258: Performed by: STUDENT IN AN ORGANIZED HEALTH CARE EDUCATION/TRAINING PROGRAM

## 2025-08-01 PROCEDURE — 99232 SBSQ HOSP IP/OBS MODERATE 35: CPT | Performed by: SURGERY

## 2025-08-01 PROCEDURE — 2500000003 HC RX 250 WO HCPCS: Performed by: SURGERY

## 2025-08-01 PROCEDURE — 6370000000 HC RX 637 (ALT 250 FOR IP): Performed by: SURGERY

## 2025-08-01 RX ADMIN — ACETAMINOPHEN 650 MG: 325 TABLET ORAL at 13:58

## 2025-08-01 RX ADMIN — ENOXAPARIN SODIUM 40 MG: 100 INJECTION SUBCUTANEOUS at 09:14

## 2025-08-01 RX ADMIN — OXYCODONE 5 MG: 5 TABLET ORAL at 05:57

## 2025-08-01 RX ADMIN — SODIUM CHLORIDE, PRESERVATIVE FREE 10 ML: 5 INJECTION INTRAVENOUS at 09:14

## 2025-08-01 RX ADMIN — ACETAMINOPHEN 650 MG: 325 TABLET ORAL at 05:56

## 2025-08-01 RX ADMIN — MEROPENEM 1000 MG: 1 INJECTION INTRAVENOUS at 05:58

## 2025-08-01 RX ADMIN — Medication 2000 UNITS: at 09:13

## 2025-08-01 RX ADMIN — HYDROCHLOROTHIAZIDE 25 MG: 25 TABLET ORAL at 09:13

## 2025-08-01 RX ADMIN — ASPIRIN 81 MG: 81 TABLET, CHEWABLE ORAL at 09:13

## 2025-08-01 RX ADMIN — PANTOPRAZOLE SODIUM 40 MG: 40 TABLET, DELAYED RELEASE ORAL at 05:57

## 2025-08-01 RX ADMIN — OXYCODONE 5 MG: 5 TABLET ORAL at 13:58

## 2025-08-01 RX ADMIN — FLUOXETINE HYDROCHLORIDE 40 MG: 20 CAPSULE ORAL at 09:13

## 2025-08-01 RX ADMIN — METOPROLOL TARTRATE 50 MG: 50 TABLET, FILM COATED ORAL at 09:13

## 2025-08-01 ASSESSMENT — PAIN SCALES - GENERAL
PAINLEVEL_OUTOF10: 0
PAINLEVEL_OUTOF10: 4
PAINLEVEL_OUTOF10: 6
PAINLEVEL_OUTOF10: 4
PAINLEVEL_OUTOF10: 7

## 2025-08-01 ASSESSMENT — PAIN DESCRIPTION - LOCATION
LOCATION: ABDOMEN
LOCATION: ABDOMEN

## 2025-08-01 ASSESSMENT — PAIN DESCRIPTION - ORIENTATION: ORIENTATION: LOWER

## 2025-08-01 ASSESSMENT — PAIN DESCRIPTION - DESCRIPTORS
DESCRIPTORS: ACHING;CRAMPING
DESCRIPTORS: CRAMPING;ACHING;SHARP

## 2025-08-01 ASSESSMENT — PAIN - FUNCTIONAL ASSESSMENT: PAIN_FUNCTIONAL_ASSESSMENT: ACTIVITIES ARE NOT PREVENTED

## 2025-08-01 NOTE — DISCHARGE SUMMARY
Discharge Summary     Date:8/1/2025        Patient Name:Dayan Lyons     YOB: 1965     Age:60 y.o.    Admit Date:7/20/2025   Admission Condition:good   Discharged Condition:good  Discharge Date: 08/01/25     Discharge Diagnoses   Principal Problem:    Intra-abdominal abscess (HCC)  Active Problems:    Diverticulitis    Crohn's disease of large intestine with complication (HCC)    Diverticulitis of large intestine with abscess without bleeding    Sepsis without acute organ dysfunction (HCC)    Diarrhea  Resolved Problems:    * No resolved hospital problems. *      Hospital Stay   Narrative of Hospital Course: Patient was admitted to hospital with abdominal pain.  CT scan shows relatively small abscess next to the sigmoid colon.  Initial management including IV antibiotics.  Abscess was too small to drain.  Patient had a persistent leukocytosis.  Repeat CT scan shows increasing abscess.  IR has aspirated abscess.  Patient is leukocytosis has improved and pain improved.  Patient stable for discharge home with IV antibiotics 3 weeks iv antibiotic meropenem per ID recommendation.  Patient has a PICC line in place.    Consultants:   IP CONSULT TO GI  IP CONSULT TO INTERVENTIONAL RADIOLOGY  IP CONSULT TO INFECTIOUS DISEASES  IP CONSULT TO VASCULAR ACCESS TEAM  IP CONSULT TO VASCULAR ACCESS TEAM  IP CONSULT HOME HEALTH    Time Spent on Discharge:  30 minutes were spent in patient examination, evaluation, counseling as well as medication reconciliation, prescriptions for required medications, discharge plan and follow up.      Surgeries/Procedures Performed:        Treatments:   As above    Significant Diagnostic Studies:   Recent Labs:  Reviewed    Radiology Last 7 Days:  CT DRAINAGE VISCERAL PERCUTANEOUS  Result Date: 7/29/2025  Technically successful CT guided aspiration of the sigmoid diverticular fluid collection. The fluid was sent to the lab for analysis. Electronically signed by Malcolm PERALES  multivitamin-minerals tablet     vitamin D 50 MCG (2000 UT) Caps capsule  Commonly known as: CHOLECALCIFEROL            STOP taking these medications      aspirin 325 MG tablet     atorvastatin 20 MG tablet  Commonly known as: LIPITOR     dicyclomine 20 MG tablet  Commonly known as: BENTYL     metoprolol tartrate 50 MG tablet  Commonly known as: LOPRESSOR     traZODone 100 MG tablet  Commonly known as: DESYREL               Where to Get Your Medications        Information about where to get these medications is not yet available    Ask your nurse or doctor about these medications  meropenem infusion         Electronically signed by Elijah Anderson MD on 8/1/25 at 4:38 PM EDT

## 2025-08-01 NOTE — PROGRESS NOTES
PROGRESS NOTE      Chief Complaints:  No new complaint.  HPI and  Objective:    Pain much better.  No fever.  Review of Systems:  Rest of review of system reviewed personally and they are negative.    EXAM:  BP (!) 131/58   Pulse 64   Temp 97.9 °F (36.6 °C) (Oral)   Resp 18   Ht 1.549 m (5' 0.98\")   Wt 101.2 kg (223 lb 1.7 oz)   SpO2 96%   BMI 42.18 kg/m²     Patient is awake   Head and neck atraumatic, normocephalic.  ENT: No hoarse voice, gaze appropriate.  Cardiac system regular rate rhythm.  Pulmonary no audible wheeze, no cyanosis.  Chest wall excursion normal with respiration cycle  Abdomen is soft not particularly distended.  Neurologically nonfocal.  Hematology system: No bruising noted.  Musculoskeletal system: No joint deformity noted.  Genitourinary system: No hematuria noted.  Skin is warm and moist.  Psychosocial: Cooperative.  Vascular examination as previously noted no changes.    Current Facility-Administered Medications   Medication Dose Route Frequency Provider Last Rate Last Admin    oxyCODONE (ROXICODONE) immediate release tablet 5 mg  5 mg Oral Q4H PRN Laila Pascual MD   5 mg at 08/01/25 0557    sodium chloride flush 0.9 % injection 5-40 mL  5-40 mL IntraVENous 2 times per day Laila Pascual MD   10 mL at 07/31/25 2014    sodium chloride flush 0.9 % injection 5-40 mL  5-40 mL IntraVENous PRN Laila Pascual MD        0.9 % sodium chloride infusion   IntraVENous PRN Laila Pascual MD        lidocaine 2 % injection 100 mg  100 mg IntraDERmal Once Laila Pascual MD        Petrolatum-Zinc Oxide ointment   Topical PRN Laila Pascual MD        meropenem (MERREM) 1,000 mg in sodium chloride 0.9 % 100 mL IVPB (addEASE)  1,000 mg IntraVENous Q8H Laila Pascual MD 33.3 mL/hr at 08/01/25 0704 Rate Verify at 08/01/25 0704    diatrizoate meglumine-sodium (GASTROGRAFIN) 66-10 % solution 30 mL  30 mL Oral ONCE PRN Laila Pascual MD   30 mL at 07/23/25 1012  1018    aspirin chewable tablet 81 mg  81 mg Oral Daily Laila Pascual MD   81 mg at 07/31/25 1018           No results found for this or any previous visit (from the past 24 hours).    ASSESSMENT:   Patient is 60 y.o. with diagnosis of : Principal Problem:    Intra-abdominal abscess (HCC)  Active Problems:    Diverticulitis    Crohn's disease of large intestine with complication (HCC)    Diverticulitis of large intestine with abscess without bleeding    Sepsis without acute organ dysfunction (HCC)    Diarrhea  Resolved Problems:    * No resolved hospital problems. *      PLAN:                 Dr. Mack recommendation noted.  Discharge planning with IV antibiotics 3 weeks.  And I will see her in follow-up 2 weeks in my office.

## 2025-08-01 NOTE — PLAN OF CARE
Problem: Pain  Goal: Verbalizes/displays adequate comfort level or baseline comfort level  7/31/2025 2316 by PATRICIA Clement, RICHARDSON  Outcome: Progressing     Problem: Skin/Tissue Integrity  Goal: Skin integrity remains intact  Description: 1.  Monitor for areas of redness and/or skin breakdown  2.  Assess vascular access sites hourly  3.  Every 4-6 hours minimum:  Change oxygen saturation probe site  4.  Every 4-6 hours:  If on nasal continuous positive airway pressure, respiratory therapy assess nares and determine need for appliance change or resting period  7/31/2025 2316 by PATRICIA Clement, RICHARDSON  Outcome: Progressing     Problem: Safety - Adult  Goal: Free from fall injury  7/31/2025 2316 by PATRICIA Clement, RICHARDSON  Outcome: Progressing     Problem: Nutrition Deficit:  Goal: Optimize nutritional status  7/31/2025 2316 by PATRICIA Clement, RICHARDSON  Outcome: Progressing

## 2025-08-01 NOTE — PROGRESS NOTES
Physician Progress Note      PATIENT:               TYLER MALIK  CSN #:                  [id number]  :                       1965  ADMIT DATE:       2025 8:38 PM  DISCH DATE:  RESPONDING  PROVIDER #:        Laila Pascual MD          QUERY TEXT:    Sepsis is present as evidenced by hypotension, leukocytosis, elevated CRP per   previous query response 2025 by Laila Rodgers MD  Based on your medical judgment, please clarify the POA status at the time of   the order to admit the patient to inpatient status:    The clinical indicators include:  > Consults by Guillermo Mack MD 2025- \"Sepsis with fever,   leukocytosis and elevated CRP\"  BP (!) 143/78  Pulse 75  Temp 97.9 ?F (36.6 ?C) (Oral)  Resp 14   > 25 07:09 Procalcitonin: 0.06 (H)  25 06:02Procalcitonin: 0.10 (H)  > 2025 VITALS: BP (!) 95/43  Pulse 65  Temp 97.3 ?F (36.3 ?C) (Oral)    Resp 16  Ht 1.549 m (5' 1\")  Options provided:  -- Sepsis present on admission  -- Sepsis was not present on admission and developed after admission  -- Other - I will add my own diagnosis  -- Disagree - Not applicable / Not valid  -- Disagree - Clinically unable to determine / Unknown  -- Refer to Clinical Documentation Reviewer    PROVIDER RESPONSE TEXT:    The diagnosis of sepsis was present on admission.    Query created by: Kelly Sanchez on 2025 10:34 AM      Electronically signed by:  Laila Pascual MD 2025 9:22 PM

## 2025-08-01 NOTE — CARE COORDINATION
CM reviewed chart. Discharge order noted.     Prescription uploaded into [facility] with HH order.     DCP :Home with home health through [facility] and [facility] for infusion. CM called Dr Nava to ask him to put in dc summary, he stated that he will.     CM set up transportation via [employer],  time 2:30.     Transition of Care Plan:    RUR: 14%  Prior Level of Functioning: independent  Disposition: home with home health  MAMADOU: today  If SNF or IPR: Date FOC offered: n/a  Date FOC received: n/a  Accepting facility: n/a  Date authorization started with reference number: n/a  Date authorization received and expires: n/a  Follow up appointments: per discharge paperwork  DME needed: none  Transportation at discharge: to be determined  IM/IMM Medicare/ letter given: yes  Is patient a  and connected with [facility]? no  If yes, was [place] transfer form completed and [facility] notified? no  Caregiver Contact: n/a  Discharge Caregiver contacted prior to discharge? N/a  Care Conference needed?  no  Barriers to discharge:  none

## 2025-08-01 NOTE — PROGRESS NOTES
Patient was discharge home with home health this shift. Patient was notified of discharge this morning by physician and nurse. Patient IV was removed however her midline stayed due to her completing ABX course at home. Patient received assistance getting dress. The home health nurse and her primary nurse went over the discharge paperwork and how to administer her ABX, where the patient stated understanding. Patient was assisted onto a stretcher and transported home via ground ambulance.

## 2025-08-01 NOTE — PLAN OF CARE
Problem: Discharge Planning  Goal: Discharge to home or other facility with appropriate resources  Outcome: Adequate for Discharge     Problem: Pain  Goal: Verbalizes/displays adequate comfort level or baseline comfort level  Outcome: Adequate for Discharge     Problem: Skin/Tissue Integrity  Goal: Skin integrity remains intact  Description: 1.  Monitor for areas of redness and/or skin breakdown  2.  Assess vascular access sites hourly  3.  Every 4-6 hours minimum:  Change oxygen saturation probe site  4.  Every 4-6 hours:  If on nasal continuous positive airway pressure, respiratory therapy assess nares and determine need for appliance change or resting period  Outcome: Adequate for Discharge     Problem: Safety - Adult  Goal: Free from fall injury  Outcome: Adequate for Discharge     Problem: Nutrition Deficit:  Goal: Optimize nutritional status  Outcome: Adequate for Discharge

## 2025-08-01 NOTE — PLAN OF CARE
Problem: Discharge Planning  Goal: Discharge to home or other facility with appropriate resources  7/31/2025 1025 by Marleny Jay RN  Outcome: Progressing  Flowsheets (Taken 7/31/2025 0740)  Discharge to home or other facility with appropriate resources:   Identify barriers to discharge with patient and caregiver   Arrange for needed discharge resources and transportation as appropriate     Problem: Pain  Goal: Verbalizes/displays adequate comfort level or baseline comfort level  7/31/2025 2316 by PATRICIA Clement RN  Outcome: Progressing  7/31/2025 1025 by Marleny Jay RN  Outcome: Progressing  Flowsheets (Taken 7/31/2025 0740)  Verbalizes/displays adequate comfort level or baseline comfort level:   Encourage patient to monitor pain and request assistance   Assess pain using appropriate pain scale     Problem: Skin/Tissue Integrity  Goal: Skin integrity remains intact  Description: 1.  Monitor for areas of redness and/or skin breakdown  2.  Assess vascular access sites hourly  3.  Every 4-6 hours minimum:  Change oxygen saturation probe site  4.  Every 4-6 hours:  If on nasal continuous positive airway pressure, respiratory therapy assess nares and determine need for appliance change or resting period  7/31/2025 2316 by PATRICIA Clmeent RN  Outcome: Progressing  7/31/2025 1025 by Marleny Jay RN  Outcome: Progressing  Flowsheets (Taken 7/31/2025 0740)  Skin Integrity Remains Intact:   Monitor for areas of redness and/or skin breakdown   Assess vascular access sites hourly     Problem: Safety - Adult  Goal: Free from fall injury  7/31/2025 2316 by PATRICIA Clement RN  Outcome: Progressing  7/31/2025 1025 by Marleny Jay RN  Outcome: Progressing     Problem: Nutrition Deficit:  Goal: Optimize nutritional status  7/31/2025 2316 by PATRICIA Clement RN  Outcome: Progressing  7/31/2025 1054 by Josiane Copeland RD  Outcome: Progressing  Flowsheets (Taken 7/31/2025 1054)  Nutrient intake appropriate for improving,  restoring, or maintaining nutritional needs:   Assess nutritional status and recommend course of action   Monitor oral intake, labs, and treatment plans

## 2025-08-04 LAB
FUNGITELL INTERPRETATION: NORMAL
FUNGITELL: <31.25 PG/ML
Lab: NORMAL
REFERENCE VALUE: NORMAL
RESULT: NORMAL

## 2025-08-13 ENCOUNTER — TELEPHONE (OUTPATIENT)
Age: 60
End: 2025-08-13

## 2025-08-13 ENCOUNTER — APPOINTMENT (OUTPATIENT)
Facility: HOSPITAL | Age: 60
End: 2025-08-13
Payer: MEDICARE

## 2025-08-13 ENCOUNTER — HOSPITAL ENCOUNTER (EMERGENCY)
Facility: HOSPITAL | Age: 60
Discharge: HOME | End: 2025-08-13
Attending: STUDENT IN AN ORGANIZED HEALTH CARE EDUCATION/TRAINING PROGRAM
Payer: MEDICARE

## 2025-08-13 VITALS
BODY MASS INDEX: 41.54 KG/M2 | DIASTOLIC BLOOD PRESSURE: 53 MMHG | TEMPERATURE: 98 F | WEIGHT: 220 LBS | SYSTOLIC BLOOD PRESSURE: 115 MMHG | HEART RATE: 99 BPM | OXYGEN SATURATION: 98 % | RESPIRATION RATE: 18 BRPM | HEIGHT: 61 IN

## 2025-08-13 DIAGNOSIS — R10.9 ABDOMINAL PAIN, UNSPECIFIED ABDOMINAL LOCATION: Primary | ICD-10-CM

## 2025-08-13 LAB
ALBUMIN SERPL-MCNC: 3 G/DL (ref 3.5–5)
ALBUMIN/GLOB SERPL: 0.7 (ref 1.1–2.2)
ALP SERPL-CCNC: 99 U/L (ref 45–117)
ALT SERPL-CCNC: 30 U/L (ref 12–78)
ANION GAP SERPL CALC-SCNC: 11 MMOL/L (ref 2–12)
APPEARANCE UR: ABNORMAL
AST SERPL W P-5'-P-CCNC: 28 U/L (ref 15–37)
BACTERIA URNS QL MICRO: NEGATIVE /HPF
BASOPHILS # BLD: 0.02 K/UL (ref 0–0.1)
BASOPHILS NFR BLD: 0.2 % (ref 0–1)
BILIRUB SERPL-MCNC: 0.8 MG/DL (ref 0.2–1)
BILIRUB UR QL: NEGATIVE
BUN SERPL-MCNC: 16 MG/DL (ref 6–20)
BUN/CREAT SERPL: 19 (ref 12–20)
CA-I BLD-MCNC: 8.9 MG/DL (ref 8.5–10.1)
CHLORIDE SERPL-SCNC: 100 MMOL/L (ref 97–108)
CO2 SERPL-SCNC: 23 MMOL/L (ref 21–32)
COLOR UR: ABNORMAL
CREAT SERPL-MCNC: 0.85 MG/DL (ref 0.55–1.02)
DIFFERENTIAL METHOD BLD: ABNORMAL
EOSINOPHIL # BLD: 0.08 K/UL (ref 0–0.4)
EOSINOPHIL NFR BLD: 0.8 % (ref 0–7)
EPITH CASTS URNS QL MICRO: ABNORMAL /LPF
ERYTHROCYTE [DISTWIDTH] IN BLOOD BY AUTOMATED COUNT: 13.7 % (ref 11.5–14.5)
GLOBULIN SER CALC-MCNC: 4.4 G/DL (ref 2–4)
GLUCOSE SERPL-MCNC: 133 MG/DL (ref 65–100)
GLUCOSE UR STRIP.AUTO-MCNC: NEGATIVE MG/DL
HCT VFR BLD AUTO: 29.1 % (ref 35–47)
HGB BLD-MCNC: 10.1 G/DL (ref 11.5–16)
HGB UR QL STRIP: NEGATIVE
HYALINE CASTS URNS QL MICRO: ABNORMAL /LPF (ref 0–5)
IMM GRANULOCYTES # BLD AUTO: 0.07 K/UL (ref 0–0.04)
IMM GRANULOCYTES NFR BLD AUTO: 0.7 % (ref 0–0.5)
KETONES UR QL STRIP.AUTO: NEGATIVE MG/DL
LEUKOCYTE ESTERASE UR QL STRIP.AUTO: ABNORMAL
LIPASE SERPL-CCNC: 24 U/L (ref 13–75)
LYMPHOCYTES # BLD: 0.84 K/UL (ref 0.8–3.5)
LYMPHOCYTES NFR BLD: 8.7 % (ref 12–49)
MCH RBC QN AUTO: 29.7 PG (ref 26–34)
MCHC RBC AUTO-ENTMCNC: 34.7 G/DL (ref 30–36.5)
MCV RBC AUTO: 85.6 FL (ref 80–99)
MONOCYTES # BLD: 0.64 K/UL (ref 0–1)
MONOCYTES NFR BLD: 6.6 % (ref 5–13)
MUCOUS THREADS URNS QL MICRO: ABNORMAL /LPF
NEUTS SEG # BLD: 8.02 K/UL (ref 1.8–8)
NEUTS SEG NFR BLD: 83 % (ref 32–75)
NITRITE UR QL STRIP.AUTO: NEGATIVE
NRBC # BLD: 0 K/UL (ref 0–0.01)
NRBC BLD-RTO: 0 PER 100 WBC
PH UR STRIP: 7 (ref 5–8)
PLATELET # BLD AUTO: 293 K/UL (ref 150–400)
PMV BLD AUTO: 11.5 FL (ref 8.9–12.9)
POTASSIUM SERPL-SCNC: 3 MMOL/L (ref 3.5–5.1)
PROT SERPL-MCNC: 7.4 G/DL (ref 6.4–8.2)
PROT UR STRIP-MCNC: >300 MG/DL
RBC # BLD AUTO: 3.4 M/UL (ref 3.8–5.2)
RBC #/AREA URNS HPF: ABNORMAL /HPF (ref 0–5)
SODIUM SERPL-SCNC: 134 MMOL/L (ref 136–145)
SP GR UR REFRACTOMETRY: 1.02 (ref 1–1.03)
URINE CULTURE IF INDICATED: ABNORMAL
UROBILINOGEN UR QL STRIP.AUTO: 2 EU/DL (ref 0.1–1)
WBC # BLD AUTO: 9.7 K/UL (ref 3.6–11)
WBC URNS QL MICRO: ABNORMAL /HPF (ref 0–4)

## 2025-08-13 PROCEDURE — 74177 CT ABD & PELVIS W/CONTRAST: CPT

## 2025-08-13 PROCEDURE — 80053 COMPREHEN METABOLIC PANEL: CPT

## 2025-08-13 PROCEDURE — 96374 THER/PROPH/DIAG INJ IV PUSH: CPT

## 2025-08-13 PROCEDURE — 6360000004 HC RX CONTRAST MEDICATION: Performed by: STUDENT IN AN ORGANIZED HEALTH CARE EDUCATION/TRAINING PROGRAM

## 2025-08-13 PROCEDURE — 6370000000 HC RX 637 (ALT 250 FOR IP): Performed by: STUDENT IN AN ORGANIZED HEALTH CARE EDUCATION/TRAINING PROGRAM

## 2025-08-13 PROCEDURE — 99285 EMERGENCY DEPT VISIT HI MDM: CPT

## 2025-08-13 PROCEDURE — 83690 ASSAY OF LIPASE: CPT

## 2025-08-13 PROCEDURE — 81001 URINALYSIS AUTO W/SCOPE: CPT

## 2025-08-13 PROCEDURE — 6360000002 HC RX W HCPCS: Performed by: STUDENT IN AN ORGANIZED HEALTH CARE EDUCATION/TRAINING PROGRAM

## 2025-08-13 PROCEDURE — 96375 TX/PRO/DX INJ NEW DRUG ADDON: CPT

## 2025-08-13 PROCEDURE — 36415 COLL VENOUS BLD VENIPUNCTURE: CPT

## 2025-08-13 PROCEDURE — 85025 COMPLETE CBC W/AUTO DIFF WBC: CPT

## 2025-08-13 RX ORDER — OXYCODONE HYDROCHLORIDE 5 MG/1
5 TABLET ORAL EVERY 8 HOURS PRN
Qty: 9 TABLET | Refills: 0 | Status: SHIPPED | OUTPATIENT
Start: 2025-08-13 | End: 2025-08-16

## 2025-08-13 RX ORDER — MORPHINE SULFATE 4 MG/ML
4 INJECTION, SOLUTION INTRAMUSCULAR; INTRAVENOUS
Refills: 0 | Status: COMPLETED | OUTPATIENT
Start: 2025-08-13 | End: 2025-08-13

## 2025-08-13 RX ORDER — POTASSIUM CHLORIDE 750 MG/1
40 TABLET, EXTENDED RELEASE ORAL ONCE
Status: COMPLETED | OUTPATIENT
Start: 2025-08-13 | End: 2025-08-13

## 2025-08-13 RX ORDER — IOPAMIDOL 755 MG/ML
100 INJECTION, SOLUTION INTRAVASCULAR
Status: COMPLETED | OUTPATIENT
Start: 2025-08-13 | End: 2025-08-13

## 2025-08-13 RX ORDER — ONDANSETRON 2 MG/ML
4 INJECTION INTRAMUSCULAR; INTRAVENOUS ONCE
Status: COMPLETED | OUTPATIENT
Start: 2025-08-13 | End: 2025-08-13

## 2025-08-13 RX ADMIN — MORPHINE SULFATE 4 MG: 4 INJECTION, SOLUTION INTRAMUSCULAR; INTRAVENOUS at 19:05

## 2025-08-13 RX ADMIN — ONDANSETRON 4 MG: 2 INJECTION, SOLUTION INTRAMUSCULAR; INTRAVENOUS at 19:05

## 2025-08-13 RX ADMIN — POTASSIUM CHLORIDE 40 MEQ: 750 TABLET, FILM COATED, EXTENDED RELEASE ORAL at 21:48

## 2025-08-13 RX ADMIN — IOPAMIDOL 100 ML: 755 INJECTION, SOLUTION INTRAVENOUS at 21:05

## 2025-08-13 ASSESSMENT — PAIN - FUNCTIONAL ASSESSMENT: PAIN_FUNCTIONAL_ASSESSMENT: 0-10

## 2025-08-13 ASSESSMENT — PAIN SCALES - GENERAL: PAINLEVEL_OUTOF10: 7

## 2025-08-13 ASSESSMENT — PAIN DESCRIPTION - LOCATION: LOCATION: ABDOMEN

## (undated) DEVICE — Device

## (undated) DEVICE — BAG BELONG PT PERS CLEAR HANDL

## (undated) DEVICE — SET ADMIN 16ML TBNG L100IN 2 Y INJ SITE IV PIGGY BK DISP

## (undated) DEVICE — ELECTRODE,RADIOTRANSLUCENT,FOAM,3PK: Brand: MEDLINE

## (undated) DEVICE — BASIN EMSIS 16OZ GRAPHITE PLAS KID SHP MOLD GRAD FOR ORAL

## (undated) DEVICE — CANN NASAL O2 CAPNOGRAPHY AD -- FILTERLINE

## (undated) DEVICE — KIT COLON W/ 1.1OZ LUB AND 2 END

## (undated) DEVICE — CATH IV AUTOGRD BC PNK 20GA 25 -- INSYTE

## (undated) DEVICE — CUFF BLD PRSS AD SZ 11 FOR 25-34CM 1 TB TRIPURPOSE CONN

## (undated) DEVICE — SOLIDIFIER MEDC 1200ML -- CONVERT TO 356117

## (undated) DEVICE — SENSOR RMFG PLSE OXMTR INF --

## (undated) DEVICE — 1200 GUARD II KIT W/5MM TUBE W/O VAC TUBE: Brand: GUARDIAN